# Patient Record
Sex: FEMALE | Race: OTHER | HISPANIC OR LATINO | Employment: UNEMPLOYED | URBAN - METROPOLITAN AREA
[De-identification: names, ages, dates, MRNs, and addresses within clinical notes are randomized per-mention and may not be internally consistent; named-entity substitution may affect disease eponyms.]

---

## 2019-06-20 ENCOUNTER — INITIAL PRENATAL (OUTPATIENT)
Dept: OBGYN CLINIC | Facility: CLINIC | Age: 19
End: 2019-06-20

## 2019-06-20 VITALS
WEIGHT: 98.8 LBS | DIASTOLIC BLOOD PRESSURE: 72 MMHG | SYSTOLIC BLOOD PRESSURE: 106 MMHG | HEIGHT: 56 IN | RESPIRATION RATE: 16 BRPM | HEART RATE: 100 BPM | BODY MASS INDEX: 22.22 KG/M2

## 2019-06-20 DIAGNOSIS — Z34.91 PRENATAL CARE IN FIRST TRIMESTER: Primary | ICD-10-CM

## 2019-06-20 PROCEDURE — 99211 OFF/OP EST MAY X REQ PHY/QHP: CPT

## 2019-06-25 ENCOUNTER — ROUTINE PRENATAL (OUTPATIENT)
Dept: OBGYN CLINIC | Facility: CLINIC | Age: 19
End: 2019-06-25

## 2019-06-25 VITALS
BODY MASS INDEX: 22.27 KG/M2 | HEART RATE: 109 BPM | HEIGHT: 56 IN | WEIGHT: 99 LBS | DIASTOLIC BLOOD PRESSURE: 71 MMHG | SYSTOLIC BLOOD PRESSURE: 110 MMHG

## 2019-06-25 DIAGNOSIS — Z11.3 ROUTINE SCREENING FOR STI (SEXUALLY TRANSMITTED INFECTION): Primary | ICD-10-CM

## 2019-06-25 DIAGNOSIS — Z3A.08 8 WEEKS GESTATION OF PREGNANCY: ICD-10-CM

## 2019-06-25 PROCEDURE — 87491 CHLMYD TRACH DNA AMP PROBE: CPT | Performed by: FAMILY MEDICINE

## 2019-06-25 PROCEDURE — 99213 OFFICE O/P EST LOW 20 MIN: CPT | Performed by: OBSTETRICS & GYNECOLOGY

## 2019-06-25 PROCEDURE — 87591 N.GONORRHOEAE DNA AMP PROB: CPT | Performed by: FAMILY MEDICINE

## 2019-06-26 DIAGNOSIS — A74.9 CHLAMYDIA: Primary | ICD-10-CM

## 2019-06-26 LAB
C TRACH DNA SPEC QL NAA+PROBE: POSITIVE
N GONORRHOEA DNA SPEC QL NAA+PROBE: NEGATIVE

## 2019-06-26 RX ORDER — AZITHROMYCIN 250 MG/1
1000 TABLET, FILM COATED ORAL ONCE
Qty: 4 TABLET | Refills: 0 | Status: SHIPPED | OUTPATIENT
Start: 2019-06-26 | End: 2019-06-26

## 2019-06-27 ENCOUNTER — TELEPHONE (OUTPATIENT)
Dept: OBGYN CLINIC | Facility: CLINIC | Age: 19
End: 2019-06-27

## 2019-07-02 ENCOUNTER — OFFICE VISIT (OUTPATIENT)
Dept: OBGYN CLINIC | Facility: CLINIC | Age: 19
End: 2019-07-02

## 2019-07-02 ENCOUNTER — PATIENT OUTREACH (OUTPATIENT)
Dept: OBGYN CLINIC | Facility: CLINIC | Age: 19
End: 2019-07-02

## 2019-07-02 VITALS
SYSTOLIC BLOOD PRESSURE: 106 MMHG | HEIGHT: 56 IN | WEIGHT: 94 LBS | BODY MASS INDEX: 21.15 KG/M2 | HEART RATE: 121 BPM | DIASTOLIC BLOOD PRESSURE: 70 MMHG

## 2019-07-02 DIAGNOSIS — Z3A.09 9 WEEKS GESTATION OF PREGNANCY: Primary | ICD-10-CM

## 2019-07-02 DIAGNOSIS — A74.9 CHLAMYDIA INFECTION AFFECTING PREGNANCY IN FIRST TRIMESTER: ICD-10-CM

## 2019-07-02 DIAGNOSIS — O98.811 CHLAMYDIA INFECTION AFFECTING PREGNANCY IN FIRST TRIMESTER: ICD-10-CM

## 2019-07-02 DIAGNOSIS — Z87.898 HISTORY OF DOMESTIC VIOLENCE: ICD-10-CM

## 2019-07-02 PROCEDURE — 99213 OFFICE O/P EST LOW 20 MIN: CPT | Performed by: OBSTETRICS & GYNECOLOGY
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
## 2019-07-03 ENCOUNTER — PATIENT OUTREACH (OUTPATIENT)
Dept: OBGYN CLINIC | Facility: CLINIC | Age: 19
End: 2019-07-03

## 2019-07-03 ENCOUNTER — APPOINTMENT (OUTPATIENT)
Dept: LAB | Facility: HOSPITAL | Age: 19
End: 2019-07-03
Payer: COMMERCIAL

## 2019-07-03 DIAGNOSIS — Z69.81 PATIENT COUNSELED AS VICTIM OF DOMESTIC VIOLENCE: Primary | ICD-10-CM

## 2019-07-03 DIAGNOSIS — Z34.91 PRENATAL CARE IN FIRST TRIMESTER: ICD-10-CM

## 2019-07-03 LAB
ABO GROUP BLD: NORMAL
BACTERIA UR QL AUTO: ABNORMAL /HPF
BASOPHILS # BLD AUTO: 0.01 THOUSANDS/ΜL (ref 0–0.1)
BASOPHILS NFR BLD AUTO: 0 % (ref 0–1)
BILIRUB UR QL STRIP: NEGATIVE
BLD GP AB SCN SERPL QL: NEGATIVE
CAOX CRY URNS QL MICRO: ABNORMAL /HPF
CLARITY UR: ABNORMAL
COLOR UR: ABNORMAL
EOSINOPHIL # BLD AUTO: 0.04 THOUSAND/ΜL (ref 0–0.61)
EOSINOPHIL NFR BLD AUTO: 1 % (ref 0–6)
ERYTHROCYTE [DISTWIDTH] IN BLOOD BY AUTOMATED COUNT: 13.6 % (ref 11.6–15.1)
GLUCOSE UR STRIP-MCNC: NEGATIVE MG/DL
HCT VFR BLD AUTO: 34.3 % (ref 34.8–46.1)
HGB BLD-MCNC: 11.4 G/DL (ref 11.5–15.4)
HGB UR QL STRIP.AUTO: NEGATIVE
IMM GRANULOCYTES # BLD AUTO: 0.01 THOUSAND/UL (ref 0–0.2)
IMM GRANULOCYTES NFR BLD AUTO: 0 % (ref 0–2)
KETONES UR STRIP-MCNC: NEGATIVE MG/DL
LEUKOCYTE ESTERASE UR QL STRIP: ABNORMAL
LYMPHOCYTES # BLD AUTO: 1.62 THOUSANDS/ΜL (ref 0.6–4.47)
LYMPHOCYTES NFR BLD AUTO: 25 % (ref 14–44)
MCH RBC QN AUTO: 27.5 PG (ref 26.8–34.3)
MCHC RBC AUTO-ENTMCNC: 33.2 G/DL (ref 31.4–37.4)
MCV RBC AUTO: 83 FL (ref 82–98)
MONOCYTES # BLD AUTO: 0.48 THOUSAND/ΜL (ref 0.17–1.22)
MONOCYTES NFR BLD AUTO: 8 % (ref 4–12)
MUCOUS THREADS UR QL AUTO: ABNORMAL
NEUTROPHILS # BLD AUTO: 4.25 THOUSANDS/ΜL (ref 1.85–7.62)
NEUTS SEG NFR BLD AUTO: 66 % (ref 43–75)
NITRITE UR QL STRIP: NEGATIVE
NON-SQ EPI CELLS URNS QL MICRO: ABNORMAL /HPF
NRBC BLD AUTO-RTO: 0 /100 WBCS
PH UR STRIP.AUTO: 6 [PH]
PLATELET # BLD AUTO: 260 THOUSANDS/UL (ref 149–390)
PMV BLD AUTO: 10 FL (ref 8.9–12.7)
PROT UR STRIP-MCNC: ABNORMAL MG/DL
RBC # BLD AUTO: 4.14 MILLION/UL (ref 3.81–5.12)
RBC #/AREA URNS AUTO: ABNORMAL /HPF
RH BLD: POSITIVE
RUBV IGG SERPL IA-ACNC: 36.9 IU/ML
SP GR UR STRIP.AUTO: 1.03 (ref 1–1.03)
SPECIMEN EXPIRATION DATE: NORMAL
UROBILINOGEN UR QL STRIP.AUTO: 1 E.U./DL
WBC # BLD AUTO: 6.41 THOUSAND/UL (ref 4.31–10.16)
WBC #/AREA URNS AUTO: ABNORMAL /HPF

## 2019-07-03 PROCEDURE — 80081 OBSTETRIC PANEL INC HIV TSTG: CPT

## 2019-07-03 PROCEDURE — 36415 COLL VENOUS BLD VENIPUNCTURE: CPT

## 2019-07-03 PROCEDURE — 81001 URINALYSIS AUTO W/SCOPE: CPT

## 2019-07-03 PROCEDURE — 87086 URINE CULTURE/COLONY COUNT: CPT
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
## 2019-07-04 LAB
BACTERIA UR CULT: NORMAL
HBV SURFACE AG SER QL: NORMAL

## 2019-07-05 LAB
HIV 1+2 AB+HIV1 P24 AG SERPL QL IA: NORMAL
RPR SER QL: NORMAL

## 2019-07-14 ENCOUNTER — HOSPITAL ENCOUNTER (EMERGENCY)
Facility: HOSPITAL | Age: 19
Discharge: HOME | End: 2019-07-15
Attending: EMERGENCY MEDICINE | Admitting: EMERGENCY MEDICINE
Payer: COMMERCIAL

## 2019-07-14 DIAGNOSIS — E86.0 DEHYDRATION DURING PREGNANCY: ICD-10-CM

## 2019-07-14 DIAGNOSIS — O21.9 NAUSEA AND VOMITING IN PREGNANCY: Primary | ICD-10-CM

## 2019-07-14 DIAGNOSIS — O99.280 DEHYDRATION DURING PREGNANCY: ICD-10-CM

## 2019-07-14 LAB
ALBUMIN SERPL BCP-MCNC: 3.4 G/DL (ref 3.5–5)
ALP SERPL-CCNC: 44 U/L (ref 46–116)
ALT SERPL W P-5'-P-CCNC: 14 U/L (ref 12–78)
ANION GAP SERPL CALCULATED.3IONS-SCNC: 9 MMOL/L (ref 4–13)
AST SERPL W P-5'-P-CCNC: 14 U/L (ref 5–45)
BACTERIA UR QL AUTO: ABNORMAL /HPF
BASOPHILS # BLD AUTO: 0.01 THOUSANDS/ΜL (ref 0–0.1)
BASOPHILS NFR BLD AUTO: 0 % (ref 0–1)
BILIRUB SERPL-MCNC: 0.42 MG/DL (ref 0.2–1)
BILIRUB UR QL STRIP: NEGATIVE
BUN SERPL-MCNC: 6 MG/DL (ref 5–25)
CALCIUM SERPL-MCNC: 9 MG/DL (ref 8.3–10.1)
CHLORIDE SERPL-SCNC: 102 MMOL/L (ref 100–108)
CLARITY UR: ABNORMAL
CO2 SERPL-SCNC: 24 MMOL/L (ref 21–32)
COLOR UR: YELLOW
COLOR, POC: NORMAL
COLOR, POC: NORMAL
CREAT SERPL-MCNC: 0.46 MG/DL (ref 0.6–1.3)
EOSINOPHIL # BLD AUTO: 0.01 THOUSAND/ΜL (ref 0–0.61)
EOSINOPHIL NFR BLD AUTO: 0 % (ref 0–6)
ERYTHROCYTE [DISTWIDTH] IN BLOOD BY AUTOMATED COUNT: 13.7 % (ref 11.6–15.1)
GFR SERPL CREATININE-BSD FRML MDRD: 144 ML/MIN/1.73SQ M
GLUCOSE SERPL-MCNC: 87 MG/DL (ref 65–140)
GLUCOSE UR STRIP-MCNC: NEGATIVE MG/DL
HCT VFR BLD AUTO: 31.4 % (ref 34.8–46.1)
HGB BLD-MCNC: 10.8 G/DL (ref 11.5–15.4)
HGB UR QL STRIP.AUTO: NEGATIVE
HYALINE CASTS #/AREA URNS LPF: ABNORMAL /LPF
IMM GRANULOCYTES # BLD AUTO: 0.02 THOUSAND/UL (ref 0–0.2)
IMM GRANULOCYTES NFR BLD AUTO: 0 % (ref 0–2)
KETONES UR STRIP-MCNC: ABNORMAL MG/DL
LEUKOCYTE ESTERASE UR QL STRIP: ABNORMAL
LIPASE SERPL-CCNC: 137 U/L (ref 73–393)
LYMPHOCYTES # BLD AUTO: 0.53 THOUSANDS/ΜL (ref 0.6–4.47)
LYMPHOCYTES NFR BLD AUTO: 6 % (ref 14–44)
MCH RBC QN AUTO: 28 PG (ref 26.8–34.3)
MCHC RBC AUTO-ENTMCNC: 34.4 G/DL (ref 31.4–37.4)
MCV RBC AUTO: 81 FL (ref 82–98)
MONOCYTES # BLD AUTO: 0.43 THOUSAND/ΜL (ref 0.17–1.22)
MONOCYTES NFR BLD AUTO: 5 % (ref 4–12)
NEUTROPHILS # BLD AUTO: 7.74 THOUSANDS/ΜL (ref 1.85–7.62)
NEUTS SEG NFR BLD AUTO: 89 % (ref 43–75)
NITRITE UR QL STRIP: NEGATIVE
NON-SQ EPI CELLS URNS QL MICRO: ABNORMAL /HPF
NRBC BLD AUTO-RTO: 0 /100 WBCS
PH UR STRIP.AUTO: 7.5 [PH] (ref 4.5–8)
PLATELET # BLD AUTO: 200 THOUSANDS/UL (ref 149–390)
PMV BLD AUTO: 9.7 FL (ref 8.9–12.7)
POTASSIUM SERPL-SCNC: 3.9 MMOL/L (ref 3.5–5.3)
PROT SERPL-MCNC: 7.2 G/DL (ref 6.4–8.2)
PROT UR STRIP-MCNC: ABNORMAL MG/DL
RBC # BLD AUTO: 3.86 MILLION/UL (ref 3.81–5.12)
RBC #/AREA URNS AUTO: ABNORMAL /HPF
SODIUM SERPL-SCNC: 135 MMOL/L (ref 136–145)
SP GR UR STRIP.AUTO: 1.02 (ref 1–1.03)
UROBILINOGEN UR QL STRIP.AUTO: 0.2 E.U./DL
WBC # BLD AUTO: 8.74 THOUSAND/UL (ref 4.31–10.16)
WBC #/AREA URNS AUTO: ABNORMAL /HPF

## 2019-07-14 PROCEDURE — 87086 URINE CULTURE/COLONY COUNT: CPT

## 2019-07-14 PROCEDURE — 85025 COMPLETE CBC W/AUTO DIFF WBC: CPT | Performed by: EMERGENCY MEDICINE

## 2019-07-14 PROCEDURE — 96365 THER/PROPH/DIAG IV INF INIT: CPT

## 2019-07-14 PROCEDURE — 99283 EMERGENCY DEPT VISIT LOW MDM: CPT | Performed by: EMERGENCY MEDICINE

## 2019-07-14 PROCEDURE — 83690 ASSAY OF LIPASE: CPT | Performed by: EMERGENCY MEDICINE

## 2019-07-14 PROCEDURE — 80053 COMPREHEN METABOLIC PANEL: CPT | Performed by: EMERGENCY MEDICINE

## 2019-07-14 PROCEDURE — 81001 URINALYSIS AUTO W/SCOPE: CPT

## 2019-07-14 PROCEDURE — 36415 COLL VENOUS BLD VENIPUNCTURE: CPT | Performed by: EMERGENCY MEDICINE

## 2019-07-14 PROCEDURE — 99284 EMERGENCY DEPT VISIT MOD MDM: CPT

## 2019-07-14 RX ADMIN — DEXTROSE AND SODIUM CHLORIDE 1000 ML: 5; .9 INJECTION, SOLUTION INTRAVENOUS at 23:18

## 2019-07-15 VITALS
WEIGHT: 99 LBS | OXYGEN SATURATION: 99 % | BODY MASS INDEX: 22.27 KG/M2 | DIASTOLIC BLOOD PRESSURE: 61 MMHG | HEIGHT: 56 IN | TEMPERATURE: 98.7 F | RESPIRATION RATE: 20 BRPM | SYSTOLIC BLOOD PRESSURE: 101 MMHG | HEART RATE: 98 BPM

## 2019-07-15 PROCEDURE — 96375 TX/PRO/DX INJ NEW DRUG ADDON: CPT

## 2019-07-15 RX ORDER — ACETAMINOPHEN 325 MG/1
975 TABLET ORAL ONCE
Status: COMPLETED | OUTPATIENT
Start: 2019-07-15 | End: 2019-07-15

## 2019-07-15 RX ORDER — ONDANSETRON 2 MG/ML
4 INJECTION INTRAMUSCULAR; INTRAVENOUS ONCE
Status: COMPLETED | OUTPATIENT
Start: 2019-07-15 | End: 2019-07-15

## 2019-07-15 RX ORDER — ONDANSETRON 4 MG/1
4 TABLET, ORALLY DISINTEGRATING ORAL EVERY 8 HOURS PRN
Qty: 20 TABLET | Refills: 0 | Status: SHIPPED | OUTPATIENT
Start: 2019-07-15 | End: 2019-07-23

## 2019-07-15 RX ADMIN — ONDANSETRON 4 MG: 2 INJECTION INTRAMUSCULAR; INTRAVENOUS at 00:54

## 2019-07-15 RX ADMIN — ACETAMINOPHEN 975 MG: 325 TABLET ORAL at 00:54

## 2019-07-15 NOTE — ED ATTENDING ATTESTATION
Marjorie Kayser, MD, saw and evaluated the patient  I have discussed the patient with the resident/non-physician practitioner and agree with the resident's/non-physician practitioner's findings, Plan of Care, and MDM as documented in the resident's/non-physician practitioner's note, except where noted  All available labs and Radiology studies were reviewed  I was present for key portions of any procedure(s) performed by the resident/non-physician practitioner and I was immediately available to provide assistance  At this point I agree with the current assessment done in the Emergency Department  I have conducted an independent evaluation of this patient a history and physical is as follows:      Critical Care Time  Procedures     21 yo female , 11 weeks pregnant having increased vomiting and headache, decreased po intake, no vaginal bleeding, no vaginal discharge, no abdominal pain, no fever  No uri symptoms  Pt has not discussed symptoms with ob  Vss, afebrile, tachy, lungs cta, rrr, abdomen soft nontender, occipital tenderness, no neuro deficits  Zofran, tylenol, labs, ivf, urine, u/s fetus

## 2019-07-15 NOTE — ED PROVIDER NOTES
History  Chief Complaint   Patient presents with    Vomiting During Pregnancy     Pt reports being 11 weeks pregnant  Pt reports "I've been throwing up since yesterday, I haven't been able to eat or drink"  65-year-old woman with no significant past medical history,  11 weeks pregnant by dating ultrasound presents for evaluation nausea, vomiting and headache  The symptoms started roughly 4 days ago  She complains of an occipital headache  Headache started out mild but has gradually worsened  She has associated nausea and vomiting  She has vomited innumerable times over the last 4 days, nbnb  She has not been able to eat or drink anything today  She has not taken any medication for her symptoms  She only takes a prenatal vitamin  Patient had all of her 1st trimester testing 3 weeks ago  She was found to be positive for chlamydia and was treated  She has had a this scant clear vaginal discharge for the past week  She denies abdominal pain, dizziness, dysuria, numbness, weakness, tingling her extremities  Denies syncope  Prior to Admission Medications   Prescriptions Last Dose Informant Patient Reported? Taking? Prenatal Vit-Fe Fumarate-FA (PRENATAL 1+1 PO) 2019 at Unknown time  Yes Yes   Sig: Take by mouth      Facility-Administered Medications: None       Past Medical History:   Diagnosis Date    Varicella        History reviewed  No pertinent surgical history  Family History   Problem Relation Age of Onset    Fibroids Mother     No Known Problems Father     Asthma Brother     Diabetes Maternal Grandmother     Breast cancer Maternal Grandmother     Brain cancer Maternal Grandmother     Diabetes Maternal Grandfather     Dementia Maternal Grandfather     Diabetes Paternal Grandmother     No Known Problems Paternal Grandfather      I have reviewed and agree with the history as documented      Social History     Tobacco Use    Smoking status: Former Smoker    Smokeless tobacco: Never Used   Substance Use Topics    Alcohol use: Not Currently    Drug use: Not Currently     Types: Marijuana        Review of Systems   Constitutional: Positive for appetite change  Negative for chills, diaphoresis, fatigue and fever  HENT: Negative for congestion, rhinorrhea and sore throat  Respiratory: Negative for cough, shortness of breath, wheezing and stridor  Cardiovascular: Negative for chest pain, palpitations and leg swelling  Gastrointestinal: Positive for nausea and vomiting  Negative for abdominal distention, abdominal pain, constipation and diarrhea  Endocrine: Negative for polydipsia and polyuria  Genitourinary: Negative for dysuria, hematuria, vaginal bleeding, vaginal discharge and vaginal pain  Musculoskeletal: Negative for back pain, neck pain and neck stiffness  Skin: Negative for pallor, rash and wound  Neurological: Positive for light-headedness and headaches  Negative for dizziness  Psychiatric/Behavioral: Negative for behavioral problems and confusion  All other systems reviewed and are negative  Physical Exam  ED Triage Vitals [07/14/19 2222]   Temperature Pulse Respirations Blood Pressure SpO2   98 7 °F (37 1 °C) (!) 118 18 98/68 98 %      Temp Source Heart Rate Source Patient Position - Orthostatic VS BP Location FiO2 (%)   Oral Monitor Lying Right arm --      Pain Score       7             Orthostatic Vital Signs  Vitals:    07/14/19 2222 07/14/19 2339 07/15/19 0057   BP: 98/68 (!) 89/50 101/61   Pulse: (!) 118 100 98   Patient Position - Orthostatic VS: Lying Lying        Physical Exam   Constitutional: She is oriented to person, place, and time  She appears well-developed and well-nourished  No distress  Dry mucus membranes   HENT:   Head: Normocephalic and atraumatic  Eyes: Conjunctivae are normal  No scleral icterus  Neck: Normal range of motion  Neck supple     Cardiovascular: Regular rhythm, normal heart sounds and intact distal pulses  Tachycardia present  No murmur heard  Pulmonary/Chest: Effort normal and breath sounds normal  No respiratory distress  She has no wheezes  Abdominal: Soft  Bowel sounds are normal  She exhibits no distension  There is tenderness  Mild suprapubic TTP   Musculoskeletal: Normal range of motion  She exhibits no edema, tenderness or deformity  Neurological: She is alert and oriented to person, place, and time  Skin: Skin is warm and dry  No rash noted  She is not diaphoretic  No erythema  No pallor  Psychiatric: She has a normal mood and affect  Her behavior is normal    Nursing note and vitals reviewed            ED Medications  Medications   dextrose 5 % and sodium chloride 0 9 % bolus 1,000 mL (0 mL Intravenous Stopped 7/15/19 0046)   ondansetron (ZOFRAN) injection 4 mg (4 mg Intravenous Given 7/15/19 0054)   acetaminophen (TYLENOL) tablet 975 mg (975 mg Oral Given 7/15/19 0054)       Diagnostic Studies  Results Reviewed     Procedure Component Value Units Date/Time    Comprehensive metabolic panel [[id number]]  (Abnormal) Collected:  07/14/19 0519    Lab Status:  Final result Specimen:  Blood from Arm, Left Updated:  07/14/19 3347     Sodium 135 mmol/L      Potassium 3 9 mmol/L      Chloride 102 mmol/L      CO2 24 mmol/L      ANION GAP 9 mmol/L      BUN 6 mg/dL      Creatinine 0 46 mg/dL      Glucose 87 mg/dL      Calcium 9 0 mg/dL      AST 14 U/L      ALT 14 U/L      Alkaline Phosphatase 44 U/L      Total Protein 7 2 g/dL      Albumin 3 4 g/dL      Total Bilirubin 0 42 mg/dL      eGFR 144 ml/min/1 73sq m     Narrative:       [place] guidelines for Chronic Kidney Disease (CKD):     Stage 1 with normal or high GFR (GFR > 90 mL/min/1 73 square meters)    Stage 2 Mild CKD (GFR = 60-89 mL/min/1 73 square meters)    Stage 3A Moderate CKD (GFR = 45-59 mL/min/1 73 square meters)    Stage 3B Moderate CKD (GFR = 30-44 mL/min/1 73 square meters)    Stage 4 Severe CKD (GFR = 15-29 mL/min/1 73 square meters)    Stage 5 End Stage CKD (GFR <15 mL/min/1 73 square meters)  Note: GFR calculation is accurate only with a steady state creatinine    Lipase [[id number]]  (Normal) Collected:  07/14/19 2319    Lab Status:  Final result Specimen:  Blood from Arm, Left Updated:  07/14/19 2353     Lipase 137 u/L     Urine Microscopic [[id number]]  (Abnormal) Collected:  07/14/19 2318    Lab Status:  Final result Specimen:  Urine, Clean Catch Updated:  07/14/19 2336     RBC, UA 4-10 /hpf      WBC, UA 4-10 /hpf      Epithelial Cells Moderate /hpf      Bacteria, UA Occasional /hpf      Hyaline Casts, UA 10-25 /lpf     CBC and differential [[id number]]  (Abnormal) Collected:  07/14/19 2319    Lab Status:  Final result Specimen:  Blood from Arm, Left Updated:  07/14/19 2327     WBC 8 74 Thousand/uL      RBC 3 86 Million/uL      Hemoglobin 10 8 g/dL      Hematocrit 31 4 %      MCV 81 fL      MCH 28 0 pg      MCHC 34 4 g/dL      RDW 13 7 %      MPV 9 7 fL      Platelets 951 Thousands/uL      nRBC 0 /100 WBCs      Neutrophils Relative 89 %      Immat GRANS % 0 %      Lymphocytes Relative 6 %      Monocytes Relative 5 %      Eosinophils Relative 0 %      Basophils Relative 0 %      Neutrophils Absolute 7 74 Thousands/µL      Immature Grans Absolute 0 02 Thousand/uL      Lymphocytes Absolute 0 53 Thousands/µL      Monocytes Absolute 0 43 Thousand/µL      Eosinophils Absolute 0 01 Thousand/µL      Basophils Absolute 0 01 Thousands/µL     Urine culture [[id number]] Collected:  07/14/19 2318    Lab Status:   In process Specimen:  Urine, Clean Catch Updated:  07/14/19 2325    POCT urinalysis dipstick [[id number]]  (Normal) Resulted:  07/14/19 2316    Lab Status:  Final result Specimen:  Urine Updated:  07/14/19 2316     Color, UA see chart    POCT urinalysis dipstick [[id number]]  (Normal) Resulted:  07/14/19 2316    Lab Status:  Final result Updated:  07/14/19 2316     Color, UA see chart    ED Urine Macroscopic [[id number]]  (Abnormal) Collected:  19    Lab Status:  Final result Specimen:  Urine Updated:  19     Color, UA Yellow     Clarity, UA Slightly Cloudy     pH, UA 7 5     Leukocytes, UA Trace     Nitrite, UA Negative     Protein, UA 30 (1+) mg/dl      Glucose, UA Negative mg/dl      Ketones, UA 40 (2+) mg/dl      Urobilinogen, UA 0 2 E U /dl      Bilirubin, UA Negative     Blood, UA Negative     Specific Gravity, UA 1 020    Narrative:       CLINITEK RESULT    Chlamydia/GC amplified DNA by PCR [[id number]]     Lab Status:  No result Specimen:  Urine, Other                  No orders to display         Procedures  Procedures        ED Course  ED Course as of Jul 15 0224   Sun 2019   2342 Ketones, UA(!): 40 (2+)   Mon Jul 15, 2019   0140 Patient feeling well on re-evaluation  Patient tolerating p o  Fluids  Will discharge home with script for Zofran  Follow up with OBGYN tomorrow  MDM  Number of Diagnoses or Management Options  Dehydration during pregnancy: new and requires workup  Nausea and vomiting in pregnancy: new and requires workup  Diagnosis management comments: 56-year-old woman  at 11 weeks presents with nausea vomiting  On exam patient has dry mucous membranes, appears clinically dehydrated and has mild suprapubic tenderness to palpation  Will check labs, urinalysis, abdominal ultrasound for fetal heart tones  Will give Zofran, Tylenol, bolus of 1 L D5 NS  Reassess         Amount and/or Complexity of Data Reviewed  Clinical lab tests: ordered and reviewed  Decide to obtain previous medical records or to obtain history from someone other than the patient: yes  Obtain history from someone other than the patient: yes  Review and summarize past medical records: yes  Discuss the patient with other providers: yes  Independent visualization of images, tracings, or specimens: yes    Risk of Complications, Morbidity, and/or Mortality  Presenting problems: low  Diagnostic procedures: minimal  Management options: minimal    Patient Progress  Patient progress: improved      Disposition  Final diagnoses:   Nausea and vomiting in pregnancy   Dehydration during pregnancy     Time reflects when diagnosis was documented in both MDM as applicable and the Disposition within this note     Time User Action Codes Description Comment    7/15/2019  1:32 AM Ashanti Forrest Add [O21 9] Nausea and vomiting in pregnancy     7/15/2019  1:32 AM Freedom Forrest Add [O26 899,  E86 0] Dehydration during pregnancy       ED Disposition     ED Disposition Condition Date/Time Comment    Discharge Stable Mon Jul 15, 2019  1:32 AM Bryan Miranda discharge to home/self care  Follow-up Information     Follow up With Specialties Details Why Contact Info Additional [address] [facility] Emergency [facility]  As needed [address] [facility], [address] [place] [address], [facility]          Discharge Medication List as of 7/15/2019  1:33 AM      START taking these medications    Details   ondansetron (ZOFRAN-ODT) 4 mg disintegrating tablet Take 1 tablet (4 mg total) by mouth every 8 (eight) hours as needed for nausea or vomiting, Starting Mon 7/15/2019, Print         CONTINUE these medications which have NOT CHANGED    Details   Prenatal Vit-Fe Fumarate-FA (PRENATAL 1+1 PO) Take by mouth, Historical Med           No discharge procedures on file  ED Provider  Attending physically available and evaluated Bryan Miranda  I managed the patient along with the ED Attending      Electronically Signed by         Ceasar Moran MD  07/15/19 5265

## 2019-07-17 LAB — BACTERIA UR CULT: ABNORMAL

## 2019-07-22 PROBLEM — Z3A.12 12 WEEKS GESTATION OF PREGNANCY: Status: ACTIVE | Noted: 2019-06-25

## 2019-07-22 NOTE — PATIENT INSTRUCTIONS
Pregnancy at 15 to 18 Weeks   [address]:   What changes are happening in my body? Now that you are in your second trimester, you have more energy  You may also feel hungrier than usual  You may start to experience other symptoms, such as heartburn or dizziness  You may be gaining about ½ to 1 pound a week, and your pregnancy is beginning to show  You may need to start wearing maternity clothes  How do I care for myself at this stage of my pregnancy? · Manage heartburn  by eating 4 or 5 small meals each day instead of large meals  Avoid spicy foods  Avoid eating right before bedtime  · Manage nausea and vomiting  Avoid fatty and spicy foods  Eat small meals throughout the day instead of large meals  Chelle may help to decrease nausea  Ask your healthcare provider about other ways of decreasing nausea and vomiting  · Eat a variety of healthy foods  Healthy foods include fruits, vegetables, whole-grain breads, low-fat dairy foods, beans, lean meats, and fish  Drink liquids as directed  Ask how much liquid to drink each day and which liquids are best for you  Limit caffeine to less than 200 milligrams each day  Limit your intake of fish to 2 servings each week  Choose fish low in mercury such as canned light tuna, shrimp, salmon, cod, or tilapia  Do not  eat fish high in mercury such as swordfish, tilefish, jackie mackerel, and shark  · Take prenatal vitamins as directed  Your need for certain vitamins and minerals, such as folic acid, increases during pregnancy  Prenatal vitamins provide some of the extra vitamins and minerals you need  Prenatal vitamins may also help to decrease the risk of certain birth defects  · Do not smoke  If you smoke, it is never too late to quit  Smoking increases your risk of a miscarriage and other health problems during your pregnancy  Smoking can cause your baby to be born too early or weigh less at birth   Ask your healthcare provider for information if you need help quitting  · Do not drink alcohol  Alcohol passes from your body to your baby through the placenta  It can affect your baby's brain development and cause fetal alcohol syndrome (FAS)  FAS is a group of conditions that causes mental, behavior, and growth problems  · Talk to your healthcare provider before you take any medicines  Many medicines may harm your baby if you take them when you are pregnant  Do not take any medicines, vitamins, herbs, or supplements without first talking to your healthcare provider  Never use illegal or street drugs (such as marijuana or cocaine) while you are pregnant  What are some safety tips during pregnancy? · Avoid hot tubs and saunas  Do not use a hot tub or sauna while you are pregnant, especially during your first trimester  Hot tubs and saunas may raise your baby's temperature and increase the risk of birth defects  · Avoid toxoplasmosis  This is an infection caused by eating raw meat or being around infected cat feces  It can cause birth defects, miscarriages, and other problems  Wash your hands after you touch raw meat  Make sure any meat is well-cooked before you eat it  Avoid raw eggs and unpasteurized milk  Use gloves or ask someone else to clean your cat's litter box while you are pregnant  What changes are happening with my baby? By 18 weeks, your baby may be about 6 inches long from the top of the head to the rump (baby's bottom)  Your baby may weigh about 11 ounces  You may be able to feel your baby's movement at about 18 weeks or later  The first movements may not be that noticeable  They may feel like a fluttering sensation  Your baby also makes sucking movements and can hear certain sounds  What do I need to know about prenatal care? During the first 28 weeks of your pregnancy, you will see your healthcare provider once a month  Your healthcare provider will check your blood pressure and weight   You may also need any of the following:  · A urine test  may also be done to check for sugar and protein  These can be signs of gestational diabetes or infection  · A blood test  may be done to check for anemia (low iron level)  · Fundal height check  is a measurement of your uterus to check your baby's growth  This number is usually the same as the number of weeks that you have been pregnant  · An ultrasound  may be done to check your baby's development  Your healthcare provider may be able to tell you what your baby's gender is during the ultrasound  · Your baby's heart rate  will be checked  When should I seek immediate care? · You have pain or cramping in your abdomen or low back  · You have heavy vaginal bleeding or clotting  · You pass material that looks like tissue or large clots  Collect the material and bring it with you  When should I contact my healthcare provider? · You cannot keep food or drinks down, and you are losing weight  · You have light bleeding  · You have chills or a fever  · You have vaginal itching, burning, or pain  · You have yellow, green, white, or foul-smelling vaginal discharge  · You have pain or burning when you urinate, less urine than usual, or pink or bloody urine  · You have questions or concerns about your condition or care  CARE AGREEMENT:   You have the right to help plan your care  Learn about your health condition and how it may be treated  Discuss treatment options with your caregivers to decide what care you want to receive  You always have the right to refuse treatment  The above information is an  only  It is not intended as medical advice for individual conditions or treatments  Talk to your doctor, nurse or pharmacist before following any medical regimen to see if it is safe and effective for you    © 2017 [employer] Information is for End User's use only and may not be sold, redistributed or otherwise used for commercial purposes  All illustrations and images included in CareNotes® are the copyrighted property of [employer] D A M [employer]  or [employer]  Pregnancy at [address] to [address]:   What changes are happening in my body? You are now at the end of your first trimester and entering your second trimester  Morning sickness usually goes away by this time  You may have other symptoms such as fatigue, frequent urination, and headaches  You may have gained between 2 to 4 pounds by now  How do I care for myself at this stage of my pregnancy? · Get plenty of rest   You may feel more tired than normal  You may need to take naps or go to bed earlier  · Manage nausea and vomiting  Avoid fatty and spicy foods  Eat small meals throughout the day instead of large meals  Chelle may help to decrease nausea  Ask your healthcare provider about other ways of decreasing nausea and vomiting  · Eat a variety of healthy foods  Healthy foods include fruits, vegetables, whole-grain breads, low-fat dairy foods, beans, lean meats, and fish  Drink liquids as directed  Ask how much liquid to drink each day and which liquids are best for you  Limit caffeine to less than 200 milligrams each day  Limit your intake of fish to 2 servings each week  Choose fish low in mercury such as canned light tuna, shrimp, salmon, cod, or tilapia  Do not  eat fish high in mercury such as swordfish, tilefish, jackie mackerel, and shark  · Take prenatal vitamins as directed  Your need for certain vitamins and minerals, such as folic acid, increases during pregnancy  Prenatal vitamins provide some of the extra vitamins and minerals you need  Prenatal vitamins may also help to decrease the risk of certain birth defects  · Do not smoke  If you smoke, it is never too late to quit  Smoking increases your risk of a miscarriage and other health problems during your pregnancy   Smoking can cause your baby to be born too early or weigh less at birth  Ask your healthcare provider for information if you need help quitting  · Do not drink alcohol  Alcohol passes from your body to your baby through the placenta  It can affect your baby's brain development and cause fetal alcohol syndrome (FAS)  FAS is a group of conditions that causes mental, behavior, and growth problems  · Talk to your healthcare provider before you take any medicines  Many medicines may harm your baby if you take them when you are pregnant  Do not take any medicines, vitamins, herbs, or supplements without first talking to your healthcare provider  Never use illegal or street drugs (such as marijuana or cocaine) while you are pregnant  What are some safety tips during pregnancy? · Avoid hot tubs and saunas  Do not use a hot tub or sauna while you are pregnant, especially during your first trimester  Hot tubs and saunas may raise your baby's temperature and increase the risk of birth defects  · Avoid toxoplasmosis  This is an infection caused by eating raw meat or being around infected cat feces  It can cause birth defects, miscarriages, and other problems  Wash your hands after you touch raw meat  Make sure any meat is well-cooked before you eat it  Avoid raw eggs and unpasteurized milk  Use gloves or ask someone else to clean your cat's litter box while you are pregnant  What changes are happening with my baby? Your baby has fully formed fingernails and toenails  Your baby's heartbeat can now be heard  Ask your healthcare provider if you can listen to your baby's heartbeat  By week 14, your baby is over 4 inches long from the top of the head to the rump (baby's bottom)  Your baby weighs over 3 ounces  What do I need to know about prenatal care? During the first 28 weeks of your pregnancy, you will see your healthcare provider once a month  Prenatal care can help prevent problems during pregnancy and childbirth   Your healthcare provider will check your blood pressure and weight  You may also need any of the following:  · A urine test  may also be done to check for sugar and protein  These can be signs of gestational diabetes or infection  · Genetic disorders screening tests  may be offered to you  This screening test checks your baby's risk of genetic disorders such as Down syndrome  The screening test includes a blood test and ultrasound  · Your baby's heart rate  will be checked  When should I seek immediate care? · You have pain or cramping in your abdomen or low back  · You have heavy vaginal bleeding or clotting  · You pass material that looks like tissue or large clots  Collect the material and bring it with you  When should I contact my healthcare provider? · You cannot keep food or drinks down, and you are losing weight  · You have light bleeding  · You have chills or a fever  · You have vaginal itching, burning, or pain  · You have yellow, green, white, or foul-smelling vaginal discharge  · You have pain or burning when you urinate, less urine than usual, or pink or bloody urine  · You have questions or concerns about your condition or care  CARE AGREEMENT:   You have the right to help plan your care  Learn about your health condition and how it may be treated  Discuss treatment options with your caregivers to decide what care you want to receive  You always have the right to refuse treatment  The above information is an  only  It is not intended as medical advice for individual conditions or treatments  Talk to your doctor, nurse or pharmacist before following any medical regimen to see if it is safe and effective for you  © 2017 [address] [employer] Information is for End User's use only and may not be sold, redistributed or otherwise used for commercial purposes   All illustrations and images included in CareNotes® are the copyrighted property of [employer]  or [employer]

## 2019-07-23 ENCOUNTER — PATIENT OUTREACH (OUTPATIENT)
Dept: OBGYN CLINIC | Facility: CLINIC | Age: 19
End: 2019-07-23

## 2019-07-23 ENCOUNTER — ROUTINE PRENATAL (OUTPATIENT)
Dept: OBGYN CLINIC | Facility: CLINIC | Age: 19
End: 2019-07-23

## 2019-07-23 VITALS
HEIGHT: 56 IN | SYSTOLIC BLOOD PRESSURE: 103 MMHG | HEART RATE: 94 BPM | BODY MASS INDEX: 22.04 KG/M2 | WEIGHT: 98 LBS | DIASTOLIC BLOOD PRESSURE: 70 MMHG

## 2019-07-23 DIAGNOSIS — A74.9 CHLAMYDIA INFECTION AFFECTING PREGNANCY IN FIRST TRIMESTER: Primary | ICD-10-CM

## 2019-07-23 DIAGNOSIS — Z3A.12 12 WEEKS GESTATION OF PREGNANCY: ICD-10-CM

## 2019-07-23 DIAGNOSIS — O98.811 CHLAMYDIA INFECTION AFFECTING PREGNANCY IN FIRST TRIMESTER: Primary | ICD-10-CM

## 2019-07-23 PROCEDURE — 87591 N.GONORRHOEAE DNA AMP PROB: CPT | Performed by: NURSE PRACTITIONER

## 2019-07-23 PROCEDURE — 87491 CHLMYD TRACH DNA AMP PROBE: CPT | Performed by: NURSE PRACTITIONER

## 2019-07-23 PROCEDURE — 99213 OFFICE O/P EST LOW 20 MIN: CPT | Performed by: NURSE PRACTITIONER
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
## 2019-07-24 LAB
C TRACH DNA SPEC QL NAA+PROBE: NEGATIVE
N GONORRHOEA DNA SPEC QL NAA+PROBE: NEGATIVE

## 2019-07-31 ENCOUNTER — ROUTINE PRENATAL (OUTPATIENT)
Dept: PERINATAL CARE | Facility: CLINIC | Age: 19
End: 2019-07-31
Payer: COMMERCIAL

## 2019-07-31 VITALS
DIASTOLIC BLOOD PRESSURE: 67 MMHG | HEART RATE: 101 BPM | SYSTOLIC BLOOD PRESSURE: 105 MMHG | BODY MASS INDEX: 22.36 KG/M2 | HEIGHT: 56 IN | WEIGHT: 99.4 LBS

## 2019-07-31 DIAGNOSIS — O98.811 CHLAMYDIA INFECTION AFFECTING PREGNANCY IN FIRST TRIMESTER: ICD-10-CM

## 2019-07-31 DIAGNOSIS — A74.9 CHLAMYDIA INFECTION AFFECTING PREGNANCY IN FIRST TRIMESTER: ICD-10-CM

## 2019-07-31 DIAGNOSIS — Z87.898 HISTORY OF DOMESTIC VIOLENCE: ICD-10-CM

## 2019-07-31 DIAGNOSIS — Z34.91 PRENATAL CARE IN FIRST TRIMESTER: ICD-10-CM

## 2019-07-31 DIAGNOSIS — Z3A.13 13 WEEKS GESTATION OF PREGNANCY: ICD-10-CM

## 2019-07-31 DIAGNOSIS — Z36.82 NUCHAL TRANSLUCENCY OF FETUS ON PRENATAL ULTRASOUND: Primary | ICD-10-CM

## 2019-07-31 PROCEDURE — 76813 OB US NUCHAL MEAS 1 GEST: CPT | Performed by: OBSTETRICS & GYNECOLOGY

## 2019-07-31 NOTE — LETTER
July 31, 2019     [address] PROVIDER    Patient: Lucina Andres   YOB: 2000   Date of Visit: 7/31/2019       Dear   Provider: Thank you for referring Lucina Andres to me for evaluation  Below are my notes for this consultation  If you have questions, please do not hesitate to call me  I look forward to following your patient along with you  Sincerely,        Obdulia Mejia MD        CC: No Recipients  Obdulia Mejia MD  7/31/2019  7:59 PM  Incomplete    Please refer to the [facility] ultrasound report in Ob Procedures for additional information regarding the visit to the [facility]  today    Obdulia Mejia MD

## 2019-07-31 NOTE — PROGRESS NOTES
Please refer to the [facility] ultrasound report in Ob Procedures for additional information regarding the visit to the [facility]  today    Jude Antoine MD

## 2019-08-19 PROBLEM — Z3A.16 16 WEEKS GESTATION OF PREGNANCY: Status: ACTIVE | Noted: 2019-06-25

## 2019-08-19 NOTE — PATIENT INSTRUCTIONS
Pregnancy at 23 to 22 [address]:   What changes are happening in my body? Now that you are in your second trimester, you have more energy  You may also be feeling hungrier than usual  You may be gaining about ½ to 1 pound a week, and your pregnancy is beginning to show  You may need to start wearing maternity clothes  As your baby gets larger, you may have other symptoms  These may include body aches or stretch marks on your abdomen, breasts, thighs, or buttocks  How do I care for myself at this stage of my pregnancy? · Eat a variety of healthy foods  Healthy foods include fruits, vegetables, whole-grain breads, low-fat dairy foods, beans, lean meats, and fish  Drink liquids as directed  Ask how much liquid to drink each day and which liquids are best for you  Limit caffeine to less than 200 milligrams each day  Limit your intake of fish to 2 servings each week  Choose fish low in mercury such as canned light tuna, shrimp, salmon, cod, or tilapia  Do not  eat fish high in mercury such as swordfish, tilefish, jackie mackerel, and shark  · Take prenatal vitamins as directed  Your need for certain vitamins and minerals, such as folic acid, increases during pregnancy  Prenatal vitamins provide some of the extra vitamins and minerals you need  Prenatal vitamins may also help to decrease the risk of certain birth defects  · Talk to your healthcare provider about exercise  Moderate exercise can help you stay fit  Your healthcare provider will help you plan an exercise program that is safe for you during pregnancy  · Do not smoke  If you smoke, it is never too late to quit  Smoking increases your risk of a miscarriage and other health problems during your pregnancy  Smoking can cause your baby to be born too early or weigh less at birth  Ask your healthcare provider for information if you need help quitting  · Do not drink alcohol    Alcohol passes from your body to your baby through the placenta  It can affect your baby's brain development and cause fetal alcohol syndrome (FAS)  FAS is a group of conditions that causes mental, behavior, and growth problems  · Talk to your healthcare provider before you take any medicines  Many medicines may harm your baby if you take them when you are pregnant  Do not take any medicines, vitamins, herbs, or supplements without first talking to your healthcare provider  Never use illegal or street drugs (such as marijuana or cocaine) while you are pregnant  What are some safety tips during pregnancy? · Avoid hot tubs and saunas  Do not use a hot tub or sauna while you are pregnant, especially during your first trimester  Hot tubs and saunas may raise your baby's temperature and increase the risk of birth defects  · Avoid toxoplasmosis  This is an infection caused by eating raw meat or being around infected cat feces  It can cause birth defects, miscarriages, and other problems  Wash your hands after you touch raw meat  Make sure any meat is well-cooked before you eat it  Avoid raw eggs and unpasteurized milk  Use gloves or ask someone else to clean your cat's litter box while you are pregnant  What changes are happening with my baby? By 22 weeks, your baby is about 8 inches long from the top of the head to the rump (baby's bottom)  Your baby also weighs about 1 pound  Your baby is becoming much more active  You may be able to feel the baby move inside you now  The first movements may not be that noticeable  They may feel like a fluttering sensation  As time goes on, your baby's movements will become stronger and more noticeable  What do I need to know about prenatal care? During the first 28 weeks of your pregnancy, you will see your healthcare provider once a month  Your healthcare provider will check your blood pressure and weight  You may also need the following:  · A urine test  may also be done to check for sugar and protein   These can be signs of gestational diabetes or infection  Protein in your urine may also be a sign of preeclampsia  Preeclampsia is a condition that can develop during week 20 or later of your pregnancy  It causes high blood pressure, and it can cause problems with your kidneys and other organs  · Fundal height  is a measurement of your uterus to check your baby's growth  This number is usually the same as the number of weeks that you have been pregnant  · A fetal ultrasound  shows pictures of your baby inside your uterus  It shows your baby's development  The movement and position of your baby can also be seen  Your healthcare provider may be able to tell you what your baby's gender is during the ultrasound  · Your baby's heart rate  will be checked  When should I seek immediate care? · You develop a severe headache that does not go away  · You have new or increased vision changes, such as blurred or spotted vision  · You have new or increased swelling in your face or hands  · You have vaginal spotting or bleeding  · Your water broke or you feel warm water gushing or trickling from your vagina  When should I contact my healthcare provider? · You have abdominal cramps, pressure, or tightening  · You have a change in vaginal discharge  · You cannot keep food or drinks down, and you are losing weight  · You have chills or a fever  · You have vaginal itching, burning, or pain  · You have yellow, green, white, or foul-smelling vaginal discharge  · You have pain or burning when you urinate, less urine than usual, or pink or bloody urine  · You have questions or concerns about your condition or care  CARE AGREEMENT:   You have the right to help plan your care  Learn about your health condition and how it may be treated  Discuss treatment options with your caregivers to decide what care you want to receive  You always have the right to refuse treatment   The above information is an  only  It is not intended as medical advice for individual conditions or treatments  Talk to your doctor, nurse or pharmacist before following any medical regimen to see if it is safe and effective for you  © 2017 [address] Information is for End User's use only and may not be sold, redistributed or otherwise used for commercial purposes  All illustrations and images included in CareNotes® are the copyrighted property of A D A M [employer]  or [employer]  Pregnancy at 15 to 18 Weeks   [address]:   What changes are happening in my body? Now that you are in your second trimester, you have more energy  You may also feel hungrier than usual  You may start to experience other symptoms, such as heartburn or dizziness  You may be gaining about ½ to 1 pound a week, and your pregnancy is beginning to show  You may need to start wearing maternity clothes  How do I care for myself at this stage of my pregnancy? · Manage heartburn  by eating 4 or 5 small meals each day instead of large meals  Avoid spicy foods  Avoid eating right before bedtime  · Manage nausea and vomiting  Avoid fatty and spicy foods  Eat small meals throughout the day instead of large meals  Chelle may help to decrease nausea  Ask your healthcare provider about other ways of decreasing nausea and vomiting  · Eat a variety of healthy foods  Healthy foods include fruits, vegetables, whole-grain breads, low-fat dairy foods, beans, lean meats, and fish  Drink liquids as directed  Ask how much liquid to drink each day and which liquids are best for you  Limit caffeine to less than 200 milligrams each day  Limit your intake of fish to 2 servings each week  Choose fish low in mercury such as canned light tuna, shrimp, salmon, cod, or tilapia  Do not  eat fish high in mercury such as swordfish, tilefish, jackie mackerel, and shark  · Take prenatal vitamins as directed    Your need for certain vitamins and minerals, such as folic acid, increases during pregnancy  Prenatal vitamins provide some of the extra vitamins and minerals you need  Prenatal vitamins may also help to decrease the risk of certain birth defects  · Do not smoke  If you smoke, it is never too late to quit  Smoking increases your risk of a miscarriage and other health problems during your pregnancy  Smoking can cause your baby to be born too early or weigh less at birth  Ask your healthcare provider for information if you need help quitting  · Do not drink alcohol  Alcohol passes from your body to your baby through the placenta  It can affect your baby's brain development and cause fetal alcohol syndrome (FAS)  FAS is a group of conditions that causes mental, behavior, and growth problems  · Talk to your healthcare provider before you take any medicines  Many medicines may harm your baby if you take them when you are pregnant  Do not take any medicines, vitamins, herbs, or supplements without first talking to your healthcare provider  Never use illegal or street drugs (such as marijuana or cocaine) while you are pregnant  What are some safety tips during pregnancy? · Avoid hot tubs and saunas  Do not use a hot tub or sauna while you are pregnant, especially during your first trimester  Hot tubs and saunas may raise your baby's temperature and increase the risk of birth defects  · Avoid toxoplasmosis  This is an infection caused by eating raw meat or being around infected cat feces  It can cause birth defects, miscarriages, and other problems  Wash your hands after you touch raw meat  Make sure any meat is well-cooked before you eat it  Avoid raw eggs and unpasteurized milk  Use gloves or ask someone else to clean your cat's litter box while you are pregnant  What changes are happening with my baby? By 18 weeks, your baby may be about 6 inches long from the top of the head to the rump (baby's bottom)   Your baby may weigh about 11 ounces  You may be able to feel your baby's movement at about 18 weeks or later  The first movements may not be that noticeable  They may feel like a fluttering sensation  Your baby also makes sucking movements and can hear certain sounds  What do I need to know about prenatal care? During the first 28 weeks of your pregnancy, you will see your healthcare provider once a month  Your healthcare provider will check your blood pressure and weight  You may also need any of the following:  · A urine test  may also be done to check for sugar and protein  These can be signs of gestational diabetes or infection  · A blood test  may be done to check for anemia (low iron level)  · Fundal height check  is a measurement of your uterus to check your baby's growth  This number is usually the same as the number of weeks that you have been pregnant  · An ultrasound  may be done to check your baby's development  Your healthcare provider may be able to tell you what your baby's gender is during the ultrasound  · Your baby's heart rate  will be checked  When should I seek immediate care? · You have pain or cramping in your abdomen or low back  · You have heavy vaginal bleeding or clotting  · You pass material that looks like tissue or large clots  Collect the material and bring it with you  When should I contact my healthcare provider? · You cannot keep food or drinks down, and you are losing weight  · You have light bleeding  · You have chills or a fever  · You have vaginal itching, burning, or pain  · You have yellow, green, white, or foul-smelling vaginal discharge  · You have pain or burning when you urinate, less urine than usual, or pink or bloody urine  · You have questions or concerns about your condition or care  CARE AGREEMENT:   You have the right to help plan your care  Learn about your health condition and how it may be treated   Discuss treatment options with your caregivers to decide what care you want to receive  You always have the right to refuse treatment  The above information is an  only  It is not intended as medical advice for individual conditions or treatments  Talk to your doctor, nurse or pharmacist before following any medical regimen to see if it is safe and effective for you  © 2017 [address] Information is for End User's use only and may not be sold, redistributed or otherwise used for commercial purposes  All illustrations and images included in CareNotes® are the copyrighted property of A D A M , Inc  or [employer]

## 2019-08-20 ENCOUNTER — PATIENT OUTREACH (OUTPATIENT)
Dept: OBGYN CLINIC | Facility: CLINIC | Age: 19
End: 2019-08-20

## 2019-08-20 ENCOUNTER — ROUTINE PRENATAL (OUTPATIENT)
Dept: OBGYN CLINIC | Facility: CLINIC | Age: 19
End: 2019-08-20

## 2019-08-20 VITALS
SYSTOLIC BLOOD PRESSURE: 109 MMHG | HEART RATE: 90 BPM | WEIGHT: 104 LBS | BODY MASS INDEX: 23.39 KG/M2 | DIASTOLIC BLOOD PRESSURE: 71 MMHG | HEIGHT: 56 IN

## 2019-08-20 DIAGNOSIS — Z3A.16 16 WEEKS GESTATION OF PREGNANCY: Primary | ICD-10-CM

## 2019-08-20 DIAGNOSIS — F41.9 ANXIETY IN PREGNANCY IN SECOND TRIMESTER, ANTEPARTUM: ICD-10-CM

## 2019-08-20 DIAGNOSIS — O99.342 ANXIETY IN PREGNANCY IN SECOND TRIMESTER, ANTEPARTUM: ICD-10-CM

## 2019-08-20 PROCEDURE — 99213 OFFICE O/P EST LOW 20 MIN: CPT | Performed by: NURSE PRACTITIONER
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
## 2019-08-20 NOTE — PROGRESS NOTES
EARNESTINE met with Pt for follow up  Pt states is doing well  Still waiting for MA  Pt denies concern at this time  Taking her prenatal vitamins  States is controlling the anxiety with deep breathing  Denies other concern at this time

## 2019-08-25 ENCOUNTER — HOSPITAL ENCOUNTER (OUTPATIENT)
Facility: HOSPITAL | Age: 19
Discharge: HOME | End: 2019-08-25
Attending: OBSTETRICS & GYNECOLOGY | Admitting: OBSTETRICS & GYNECOLOGY
Payer: COMMERCIAL

## 2019-08-25 VITALS
SYSTOLIC BLOOD PRESSURE: 118 MMHG | RESPIRATION RATE: 18 BRPM | DIASTOLIC BLOOD PRESSURE: 67 MMHG | WEIGHT: 104 LBS | HEART RATE: 90 BPM | BODY MASS INDEX: 23.39 KG/M2 | HEIGHT: 56 IN | TEMPERATURE: 98 F

## 2019-08-25 PROBLEM — Z3A.17 17 WEEKS GESTATION OF PREGNANCY: Status: ACTIVE | Noted: 2019-06-25

## 2019-08-25 PROCEDURE — 99213 OFFICE O/P EST LOW 20 MIN: CPT

## 2019-08-25 PROCEDURE — NC001 PR NO CHARGE: Performed by: OBSTETRICS & GYNECOLOGY
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
## 2019-08-26 ENCOUNTER — APPOINTMENT (OUTPATIENT)
Dept: LAB | Facility: HOSPITAL | Age: 19
End: 2019-08-26
Payer: COMMERCIAL

## 2019-08-26 DIAGNOSIS — Z3A.16 16 WEEKS GESTATION OF PREGNANCY: ICD-10-CM

## 2019-08-26 PROCEDURE — 82677 ASSAY OF ESTRIOL: CPT

## 2019-08-26 PROCEDURE — 86336 INHIBIN A: CPT

## 2019-08-26 PROCEDURE — 82105 ALPHA-FETOPROTEIN SERUM: CPT

## 2019-08-26 PROCEDURE — 84702 CHORIONIC GONADOTROPIN TEST: CPT

## 2019-08-29 LAB
2ND TRIMESTER 4 SCREEN SERPL-IMP: NORMAL
2ND TRIMESTER 4 SCREEN SERPL-IMP: NORMAL
AFP ADJ MOM SERPL: 1.31
AFP SERPL-MCNC: 47.2 NG/ML
AGE AT DELIVERY: 21.5 YR
FET TS 18 RISK FROM MAT AGE: NORMAL
FET TS 21 RISK FROM MAT AGE: 1139
GA METHOD: NORMAL
GA: 16.6 WEEKS
HCG ADJ MOM SERPL: 0.63
HCG SERPL-ACNC: NORMAL MIU/ML
IDDM PATIENT QL: YES
INHIBIN A ADJ MOM SERPL: 1.1
INHIBIN A SERPL-MCNC: 240.5 PG/ML
KARYOTYP BLD/T: NORMAL
MULTIPLE PREGNANCY: NO
NEURAL TUBE DEFECT RISK FETUS: 1392 %
SERVICE CMNT-IMP: NORMAL
TS 18 RISK FETUS: NORMAL
TS 21 RISK FETUS: NORMAL
U ESTRIOL ADJ MOM SERPL: 1.17
U ESTRIOL SERPL-MCNC: 1.25 NG/ML

## 2019-09-04 ENCOUNTER — DOCUMENTATION (OUTPATIENT)
Dept: OBGYN CLINIC | Facility: CLINIC | Age: 19
End: 2019-09-04

## 2019-09-04 NOTE — PROGRESS NOTES
Pt walked into office with complaints of cramping, she reports that she is 5 months pregnant  Pt is very uncomfortable and holding/rubbing her abdomen  Pt reports she walked here from class next door to the office  Pt reports LOF yesterday, reports she feels good fetal movement, she denies any vaginal bleeding but reports that abdomen gets hard and painful  Pt placed in wheelchair  We asked OK to call 911 pt consents  Denies any family members or friends that we can call to inform  Pt getting progressively more uncomfortable and states she feels like she has to have a bowel movement and push  Pt's breathing is labored  2 ambulance personnel arrive and pt left in ambulance

## 2019-09-06 ENCOUNTER — DOCUMENTATION (OUTPATIENT)
Dept: OBGYN CLINIC | Facility: CLINIC | Age: 19
End: 2019-09-06

## 2019-09-06 ENCOUNTER — PATIENT OUTREACH (OUTPATIENT)
Dept: OBGYN CLINIC | Facility: CLINIC | Age: 19
End: 2019-09-06

## 2019-09-06 NOTE — PROGRESS NOTES
Spoke with Jackie Kasper   this morning to reach out to patient concerning recent visit to office and  hx  anxiety, social history  to see if she needs further assistance

## 2019-09-09 ENCOUNTER — ROUTINE PRENATAL (OUTPATIENT)
Dept: OBGYN CLINIC | Facility: CLINIC | Age: 19
End: 2019-09-09

## 2019-09-09 ENCOUNTER — PATIENT OUTREACH (OUTPATIENT)
Dept: OBGYN CLINIC | Facility: CLINIC | Age: 19
End: 2019-09-09

## 2019-09-09 VITALS
SYSTOLIC BLOOD PRESSURE: 93 MMHG | HEART RATE: 90 BPM | BODY MASS INDEX: 25.33 KG/M2 | HEIGHT: 56 IN | WEIGHT: 112.6 LBS | DIASTOLIC BLOOD PRESSURE: 64 MMHG

## 2019-09-09 DIAGNOSIS — R56.9 SEIZURE (HCC): ICD-10-CM

## 2019-09-09 DIAGNOSIS — A74.9 CHLAMYDIA INFECTION AFFECTING PREGNANCY IN FIRST TRIMESTER: ICD-10-CM

## 2019-09-09 DIAGNOSIS — O98.811 CHLAMYDIA INFECTION AFFECTING PREGNANCY IN FIRST TRIMESTER: ICD-10-CM

## 2019-09-09 DIAGNOSIS — F41.9 ANXIETY: ICD-10-CM

## 2019-09-09 DIAGNOSIS — F41.9 ANXIETY IN PREGNANCY IN SECOND TRIMESTER, ANTEPARTUM: ICD-10-CM

## 2019-09-09 DIAGNOSIS — Z87.898 HISTORY OF DOMESTIC VIOLENCE: ICD-10-CM

## 2019-09-09 DIAGNOSIS — O21.9 NAUSEA AND VOMITING IN PREGNANCY: ICD-10-CM

## 2019-09-09 DIAGNOSIS — O99.342 ANXIETY IN PREGNANCY IN SECOND TRIMESTER, ANTEPARTUM: ICD-10-CM

## 2019-09-09 DIAGNOSIS — Z3A.19 19 WEEKS GESTATION OF PREGNANCY: Primary | ICD-10-CM

## 2019-09-09 PROCEDURE — 99213 OFFICE O/P EST LOW 20 MIN: CPT | Performed by: OBSTETRICS & GYNECOLOGY

## 2019-09-09 RX ORDER — DIPHENHYDRAMINE HYDROCHLORIDE 25 MG/1
25 CAPSULE ORAL DAILY
Qty: 30 TABLET | Refills: 3 | Status: ON HOLD | OUTPATIENT
Start: 2019-09-09 | End: 2019-12-20

## 2019-09-09 RX ORDER — SERTRALINE HYDROCHLORIDE 25 MG/1
25 TABLET, FILM COATED ORAL DAILY
Qty: 30 TABLET | Refills: 2 | Status: SHIPPED | OUTPATIENT
Start: 2019-09-09 | End: 2019-11-22
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
## 2019-09-09 NOTE — PATIENT INSTRUCTIONS
Pregnancy at 23 to 22 [address]:   Now that you are in your second trimester, you have more energy  You may also be feeling hungrier than usual  You may be gaining about ½ to 1 pound a week, and your pregnancy is beginning to show  You may need to start wearing maternity clothes  As your baby gets larger, you may have other symptoms  These may include body aches or stretch marks on your abdomen, breasts, thighs, or buttocks  DISCHARGE INSTRUCTIONS:   Seek care immediately if:   · You develop a severe headache that does not go away  · You have new or increased vision changes, such as blurred or spotted vision  · You have new or increased swelling in your face or hands  · You have vaginal spotting or bleeding  · Your water broke or you feel warm water gushing or trickling from your vagina  Contact your healthcare provider if:   · You have abdominal cramps, pressure, or tightening  · You have a change in vaginal discharge  · You cannot keep food or drinks down, and you are losing weight  · You have chills or a fever  · You have vaginal itching, burning, or pain  · You have yellow, green, white, or foul-smelling vaginal discharge  · You have pain or burning when you urinate, less urine than usual, or pink or bloody urine  · You have questions or concerns about your condition or care  How to care for yourself at this stage of your pregnancy:   · Eat a variety of healthy foods  Healthy foods include fruits, vegetables, whole-grain breads, low-fat dairy foods, beans, lean meats, and fish  Drink liquids as directed  Ask how much liquid to drink each day and which liquids are best for you  Limit caffeine to less than 200 milligrams each day  Limit your intake of fish to 2 servings each week  Choose fish low in mercury such as canned light tuna, shrimp, salmon, cod, or tilapia  Do not  eat fish high in mercury such as swordfish, tilefish, jackie mackerel, and shark  · Take prenatal vitamins as directed  Your need for certain vitamins and minerals, such as folic acid, increases during pregnancy  Prenatal vitamins provide some of the extra vitamins and minerals you need  Prenatal vitamins may also help to decrease the risk of certain birth defects  · Talk to your healthcare provider about exercise  Moderate exercise can help you stay fit  Your healthcare provider will help you plan an exercise program that is safe for you during pregnancy  · Do not smoke  If you smoke, it is never too late to quit  Smoking increases your risk of a miscarriage and other health problems during your pregnancy  Smoking can cause your baby to be born too early or weigh less at birth  Ask your healthcare provider for information if you need help quitting  · Do not drink alcohol  Alcohol passes from your body to your baby through the placenta  It can affect your baby's brain development and cause fetal alcohol syndrome (FAS)  FAS is a group of conditions that causes mental, behavior, and growth problems  · Talk to your healthcare provider before you take any medicines  Many medicines may harm your baby if you take them when you are pregnant  Do not take any medicines, vitamins, herbs, or supplements without first talking to your healthcare provider  Never use illegal or street drugs (such as marijuana or cocaine) while you are pregnant  Safety tips during pregnancy:   · Avoid hot tubs and saunas  Do not use a hot tub or sauna while you are pregnant, especially during your first trimester  Hot tubs and saunas may raise your baby's temperature and increase the risk of birth defects  · Avoid toxoplasmosis  This is an infection caused by eating raw meat or being around infected cat feces  It can cause birth defects, miscarriages, and other problems  Wash your hands after you touch raw meat  Make sure any meat is well-cooked before you eat it   Avoid raw eggs and unpasteurized milk  Use gloves or ask someone else to clean your cat's litter box while you are pregnant  Changes that are happening with your baby:  By 22 weeks, your baby is about 8 inches long from the top of the head to the rump (baby's bottom)  Your baby also weighs about 1 pound  Your baby is becoming much more active  You may be able to feel the baby move inside you now  The first movements may not be that noticeable  They may feel like a fluttering sensation  As time goes on, your baby's movements will become stronger and more noticeable  What you need to know about prenatal care:  During the first 28 weeks of your pregnancy, you will see your healthcare provider once a month  Your healthcare provider will check your blood pressure and weight  You may also need the following:  · A urine test  may also be done to check for sugar and protein  These can be signs of gestational diabetes or infection  Protein in your urine may also be a sign of preeclampsia  Preeclampsia is a condition that can develop during week 20 or later of your pregnancy  It causes high blood pressure, and it can cause problems with your kidneys and other organs  · Fundal height  is a measurement of your uterus to check your baby's growth  This number is usually the same as the number of weeks that you have been pregnant  · A fetal ultrasound  shows pictures of your baby inside your uterus  It shows your baby's development  The movement and position of your baby can also be seen  Your healthcare provider may be able to tell you what your baby's gender is during the ultrasound  · Your baby's heart rate  will be checked  © 2017 [address] Information is for End User's use only and may not be sold, redistributed or otherwise used for commercial purposes  All illustrations and images included in CareNotes® are the copyrighted property of [employer]  or [employer]    The above information is an  only  It is not intended as medical advice for individual conditions or treatments  Talk to your doctor, nurse or pharmacist before following any medical regimen to see if it is safe and effective for you
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
## 2019-09-10 ENCOUNTER — PATIENT OUTREACH (OUTPATIENT)
Dept: OBGYN CLINIC | Facility: CLINIC | Age: 19
End: 2019-09-10

## 2019-09-10 NOTE — PROGRESS NOTES
EARNESTINE recv'd a phone call from Pt requesting housing information  Pt reported she was discharge from [employer] [facility] last night  She is currently staying with her father  Pt states she can stay there but will preferred to move out  EARNESTINE discussed [facility] [address]  Pt states she called [facility] and it is a waiting list and [address] has not return her call  SW advice to call and sign her name at [facility] waiting list and to call [facility] again  Also advice to apply at [address]  Pt verbalized understanding  Pt will try today to access MH  Will contact SW if help is needed  EARNESTINE spoke with Donovan Umana RN from P to discussed Pt current needs  She will follow her at dad's home

## 2019-09-13 ENCOUNTER — ROUTINE PRENATAL (OUTPATIENT)
Dept: PERINATAL CARE | Facility: CLINIC | Age: 19
End: 2019-09-13
Payer: COMMERCIAL

## 2019-09-13 VITALS
WEIGHT: 112 LBS | DIASTOLIC BLOOD PRESSURE: 62 MMHG | SYSTOLIC BLOOD PRESSURE: 95 MMHG | HEIGHT: 56 IN | HEART RATE: 97 BPM | BODY MASS INDEX: 25.19 KG/M2

## 2019-09-13 DIAGNOSIS — Z36.86 ENCOUNTER FOR ANTENATAL SCREENING FOR CERVICAL LENGTH: ICD-10-CM

## 2019-09-13 DIAGNOSIS — Z3A.20 20 WEEKS GESTATION OF PREGNANCY: ICD-10-CM

## 2019-09-13 DIAGNOSIS — O98.811 CHLAMYDIA INFECTION AFFECTING PREGNANCY IN FIRST TRIMESTER: ICD-10-CM

## 2019-09-13 DIAGNOSIS — Z87.898 HISTORY OF DOMESTIC VIOLENCE: ICD-10-CM

## 2019-09-13 DIAGNOSIS — Z36.3 ENCOUNTER FOR ANTENATAL SCREENING FOR MALFORMATION USING ULTRASOUND: Primary | ICD-10-CM

## 2019-09-13 DIAGNOSIS — F41.9 ANXIETY: ICD-10-CM

## 2019-09-13 DIAGNOSIS — Z3A.19 19 WEEKS GESTATION OF PREGNANCY: ICD-10-CM

## 2019-09-13 DIAGNOSIS — A74.9 CHLAMYDIA INFECTION AFFECTING PREGNANCY IN FIRST TRIMESTER: ICD-10-CM

## 2019-09-13 PROCEDURE — 76817 TRANSVAGINAL US OBSTETRIC: CPT | Performed by: OBSTETRICS & GYNECOLOGY

## 2019-09-13 PROCEDURE — 99212 OFFICE O/P EST SF 10 MIN: CPT | Performed by: OBSTETRICS & GYNECOLOGY

## 2019-09-13 PROCEDURE — 76805 OB US >/= 14 WKS SNGL FETUS: CPT | Performed by: OBSTETRICS & GYNECOLOGY

## 2019-09-13 NOTE — PROGRESS NOTES
The patient was seen today for an ultrasound  Please see ultrasound report (located under Ob Procedures) for additional details  Thank you very much for allowing us to participate in the care of this very nice patient  Should you have any questions, please do not hesitate to contact me  Noel Montes MD [phone]  Attending Physician, S[place]

## 2019-09-13 NOTE — PROGRESS NOTES
A transvaginal ultrasound was performed  Sonographer note on use of High Level Disinfection Process (Trophon) for transvaginal probe#4 used, serial [id number]    Corinne Slice, RDMS

## 2019-09-23 ENCOUNTER — ROUTINE PRENATAL (OUTPATIENT)
Dept: OBGYN CLINIC | Facility: CLINIC | Age: 19
End: 2019-09-23

## 2019-09-23 ENCOUNTER — PATIENT OUTREACH (OUTPATIENT)
Dept: OBGYN CLINIC | Facility: CLINIC | Age: 19
End: 2019-09-23

## 2019-09-23 VITALS
SYSTOLIC BLOOD PRESSURE: 94 MMHG | BODY MASS INDEX: 26.54 KG/M2 | HEIGHT: 56 IN | WEIGHT: 118 LBS | HEART RATE: 99 BPM | DIASTOLIC BLOOD PRESSURE: 65 MMHG

## 2019-09-23 DIAGNOSIS — O99.342 ANXIETY IN PREGNANCY IN SECOND TRIMESTER, ANTEPARTUM: Primary | ICD-10-CM

## 2019-09-23 DIAGNOSIS — Z3A.21 21 WEEKS GESTATION OF PREGNANCY: ICD-10-CM

## 2019-09-23 DIAGNOSIS — F41.9 ANXIETY IN PREGNANCY IN SECOND TRIMESTER, ANTEPARTUM: Primary | ICD-10-CM

## 2019-09-23 PROCEDURE — 99213 OFFICE O/P EST LOW 20 MIN: CPT | Performed by: NURSE PRACTITIONER

## 2019-09-23 PROCEDURE — 90686 IIV4 VACC NO PRSV 0.5 ML IM: CPT | Performed by: NURSE PRACTITIONER

## 2019-09-23 PROCEDURE — 90471 IMMUNIZATION ADMIN: CPT | Performed by: NURSE PRACTITIONER

## 2019-09-23 NOTE — PATIENT INSTRUCTIONS
Sertraline (By mouth)   Sertraline (SER-tra-adeola)  Treats depression, obsessive-compulsive disorder (OCD), posttraumatic stress disorder (PTSD), premenstrual dysphoric disorder (PMDD), social anxiety disorder, and panic disorder  This medicine is an SSRI  Brand Name(s): Zoloft   There may be other brand names for this medicine  When This Medicine Should Not Be Used: This medicine is not right for everyone  Do not use it if you had an allergic reaction to sertraline  How to Use This Medicine:   Liquid, Tablet  · Take your medicine as directed  Your dose may need to be changed several times to find what works best for you  You may need to take it for a few weeks or months before you feel better  · Oral liquid: Use the dropper provided to remove the medicine and mix it with 1/2 cup (4 ounces) of water, ginger ale, lemon-lime soda, lemonade, or orange juice  Drink the mixture right away  It is normal for it to look a bit hazy  · This medicine should come with a Medication Guide  Ask your pharmacist for a copy if you do not have one  · Missed dose: Take a dose as soon as you remember  If it is almost time for your next dose, wait until then and take a regular dose  Do not take extra medicine to make up for a missed dose  · Store the medicine in a closed container at room temperature, away from heat, moisture, and direct light  Drugs and Foods to Avoid:   Ask your doctor or pharmacist before using any other medicine, including over-the-counter medicines, vitamins, and herbal products  · Do not use this medicine together with pimozide  Do not use this medicine and an MAO inhibitor (MAOI) within 14 days of each other  Do not use the oral liquid form of sertraline if you are also using disulfiram   · Some medicines can affect how sertraline works   Tell your doctor if you are using the following:   ¨ Buspirone, cimetidine, cisapride, diazepam, digitoxin, fentanyl, flecainide, lithium, phenytoin, propafenone, St Vj's wort, tramadol, tryptophan supplements, or valproate  ¨ A blood thinner (such as warfarin), a diuretic (water pill), an NSAID pain or arthritis medicine (such as aspirin, diclofenac, ibuprofen), a tricyclic antidepressant, a triptan medicine for migraine headaches  · Do not drink alcohol while you are using this medicine  Warnings While Using This Medicine:   · Tell your doctor if you are pregnant or breastfeeding, or if you have liver disease, bleeding problems, glaucoma, heart disease, or a seizure disorder  · For some children, teenagers, and young adults, this medicine may increase mental or emotional problems  This may lead to thoughts of suicide and violence  Talk with your doctor right away if you have any thoughts or behavior changes that concern you  Tell your doctor if you or anyone in your family has a history of bipolar disorder or suicide attempts  · This medicine may cause the following problems:   ¨ Serotonin syndrome (when taken with certain medicines)  ¨ Low sodium levels (more common in elderly patients and those who take diuretics or become dehydrated)  · Tell your doctor if you are sensitive to latex, because the oral liquid comes with a latex rubber dropper  · This medicine may make you dizzy or drowsy  Do not drive or do anything that could be dangerous until you know how this medicine affects you  · Do not stop using this medicine suddenly  Your doctor will need to slowly decrease your dose before you stop it completely  · Your doctor will check your progress and the effects of this medicine at regular visits  Keep all appointments  · Keep all medicine out of the reach of children  Never share your medicine with anyone    Possible Side Effects While Using This Medicine:   Call your doctor right away if you notice any of these side effects:  · Allergic reaction: Itching or hives, swelling in your face or hands, swelling or tingling in your mouth or throat, chest tightness, trouble breathing  · Anxiety, restlessness, fast heartbeat, fever, sweating, muscle spasms, twitching, nausea, vomiting, diarrhea, seeing or hearing things that are not there  · Blistering, peeling, or red skin rash  · Confusion, weakness, and muscle twitching  · Eye pain, vision changes, seeing halos around lights  · Feeling more excited or energetic than usual  · Thoughts of hurting yourself or others, unusual behavior  · Unusual bleeding or bruising  If you notice these less serious side effects, talk with your doctor:   · Dry mouth  · Loss of appetite, weight loss  · Mild diarrhea, constipation, nausea, vomiting  · Sexual problems  · Sleepiness, or trouble sleeping  If you notice other side effects that you think are caused by this medicine, tell your doctor  Call your doctor for medical advice about side effects  You may report side effects to FDA at 3-157-FDA-3569  © 2017 [address] [employer] Information is for End User's use only and may not be sold, redistributed or otherwise used for commercial purposes  The above information is an  only  It is not intended as medical advice for individual conditions or treatments  Talk to your doctor, nurse or pharmacist before following any medical regimen to see if it is safe and effective for you  Pregnancy at 23 to 26 [address]:   What changes are happening in my body? You are now close to or at the beginning of the third trimester  The third trimester starts at 24 weeks and ends with delivery  As your baby gets larger, you may develop certain symptoms  These may include pain in your back or down the sides of your abdomen  You may also have stretch marks on your abdomen, breasts, thighs, or buttocks  You may also have constipation  How do I care for myself at this stage of my pregnancy? · Eat a variety of healthy foods    Healthy foods include fruits, vegetables, whole-grain breads, low-fat dairy foods, beans, lean meats, and fish  Drink liquids as directed  Ask how much liquid to drink each day and which liquids are best for you  Limit caffeine to less than 200 milligrams each day  Limit your intake of fish to 2 servings each week  Choose fish low in mercury such as canned light tuna, shrimp, salmon, cod, or tilapia  Do not  eat fish high in mercury such as swordfish, tilefish, jackie mackerel, and shark  · Manage back pain  Do not stand for long periods of time or lift heavy items  Use good posture while you stand, squat, or bend  Wear low-heeled shoes with good support  Rest may also help to relieve back pain  Ask your healthcare provider about exercises you can do to strengthen your back muscles  · Take prenatal vitamins as directed  Your need for certain vitamins and minerals, such as folic acid, increases during pregnancy  Prenatal vitamins provide some of the extra vitamins and minerals you need  Prenatal vitamins may also help to decrease the risk of certain birth defects  · Talk to your healthcare provider about exercise  Moderate exercise can help you stay fit  Your healthcare provider will help you plan an exercise program that is safe for you during pregnancy  · Do not smoke  If you smoke, it is never too late to quit  Smoking increases your risk of a miscarriage and other health problems during your pregnancy  Smoking can cause your baby to be born too early or weigh less at birth  Ask your healthcare provider for information if you need help quitting  · Do not drink alcohol  Alcohol passes from your body to your baby through the placenta  It can affect your baby's brain development and cause fetal alcohol syndrome (FAS)  FAS is a group of conditions that causes mental, behavior, and growth problems  · Talk to your healthcare provider before you take any medicines  Many medicines may harm your baby if you take them when you are pregnant   Do not take any medicines, vitamins, herbs, or supplements without first talking to your healthcare provider  Never use illegal or street drugs (such as marijuana or cocaine) while you are pregnant  What are some safety tips during pregnancy? · Avoid hot tubs and saunas  Do not use a hot tub or sauna while you are pregnant, especially during your first trimester  Hot tubs and saunas may raise your baby's temperature and increase the risk of birth defects  · Avoid toxoplasmosis  This is an infection caused by eating raw meat or being around infected cat feces  It can cause birth defects, miscarriages, and other problems  Wash your hands after you touch raw meat  Make sure any meat is well-cooked before you eat it  Avoid raw eggs and unpasteurized milk  Use gloves or ask someone else to clean your cat's litter box while you are pregnant  What changes are happening with my baby? By 26 weeks, your baby will weigh about 2 pounds  Your baby will be about 10 inches long from the top of the head to the rump (baby's bottom)  Your baby's movements are much stronger now  Your baby's eyes are almost completely formed and can partially open  Your baby also sleeps and wakes up  What do I need to know about prenatal care? Your healthcare provider will check your blood pressure and weight  You may also need the following:  · A urine test  may also be done to check for sugar and protein  These can be signs of gestational diabetes or infection  Protein in your urine may also be a sign of preeclampsia  Preeclampsia is a condition that can develop during week 20 or later of your pregnancy  It causes high blood pressure, and it can cause problems with your kidneys and other organs  · Fundal height  is a measurement of your uterus to check your baby's growth  This number is usually the same as the number of weeks that you have been pregnant  · Your baby's heart rate  will be checked  When should I seek immediate care?    · You develop a severe headache that does not go away     · You have new or increased vision changes, such as blurred or spotted vision  · You have new or increased swelling in your face or hands  · You have vaginal spotting or bleeding  · Your water broke or you feel warm water gushing or trickling from your vagina  When should I contact my healthcare provider? · You have abdominal cramps, pressure, or tightening  · You have a change in vaginal discharge  · You have light bleeding  · You have chills or a fever  · You have vaginal itching, burning, or pain  · You have yellow, green, white, or foul-smelling vaginal discharge  · You have pain or burning when you urinate, less urine than usual, or pink or bloody urine  · You have questions or concerns about your condition or care  CARE AGREEMENT:   You have the right to help plan your care  Learn about your health condition and how it may be treated  Discuss treatment options with your caregivers to decide what care you want to receive  You always have the right to refuse treatment  The above information is an  only  It is not intended as medical advice for individual conditions or treatments  Talk to your doctor, nurse or pharmacist before following any medical regimen to see if it is safe and effective for you  © 2017 [address] [facility] Information is for End User's use only and may not be sold, redistributed or otherwise used for commercial purposes  All illustrations and images included in CareNotes® are the copyrighted property of [employer]  or [employer]  Pregnancy at 23 to 22 [address] [place] [address]:   What changes are happening in my body? Now that you are in your second trimester, you have more energy  You may also be feeling hungrier than usual  You may be gaining about ½ to 1 pound a week, and your pregnancy is beginning to show  You may need to start wearing maternity clothes   As your baby gets larger, you may have other symptoms  These may include body aches or stretch marks on your abdomen, breasts, thighs, or buttocks  How do I care for myself at this stage of my pregnancy? · Eat a variety of healthy foods  Healthy foods include fruits, vegetables, whole-grain breads, low-fat dairy foods, beans, lean meats, and fish  Drink liquids as directed  Ask how much liquid to drink each day and which liquids are best for you  Limit caffeine to less than 200 milligrams each day  Limit your intake of fish to 2 servings each week  Choose fish low in mercury such as canned light tuna, shrimp, salmon, cod, or tilapia  Do not  eat fish high in mercury such as swordfish, tilefish, jackie mackerel, and shark  · Take prenatal vitamins as directed  Your need for certain vitamins and minerals, such as folic acid, increases during pregnancy  Prenatal vitamins provide some of the extra vitamins and minerals you need  Prenatal vitamins may also help to decrease the risk of certain birth defects  · Talk to your healthcare provider about exercise  Moderate exercise can help you stay fit  Your healthcare provider will help you plan an exercise program that is safe for you during pregnancy  · Do not smoke  If you smoke, it is never too late to quit  Smoking increases your risk of a miscarriage and other health problems during your pregnancy  Smoking can cause your baby to be born too early or weigh less at birth  Ask your healthcare provider for information if you need help quitting  · Do not drink alcohol  Alcohol passes from your body to your baby through the placenta  It can affect your baby's brain development and cause fetal alcohol syndrome (FAS)  FAS is a group of conditions that causes mental, behavior, and growth problems  · Talk to your healthcare provider before you take any medicines  Many medicines may harm your baby if you take them when you are pregnant   Do not take any medicines, vitamins, herbs, or supplements without first talking to your healthcare provider  Never use illegal or street drugs (such as marijuana or cocaine) while you are pregnant  What are some safety tips during pregnancy? · Avoid hot tubs and saunas  Do not use a hot tub or sauna while you are pregnant, especially during your first trimester  Hot tubs and saunas may raise your baby's temperature and increase the risk of birth defects  · Avoid toxoplasmosis  This is an infection caused by eating raw meat or being around infected cat feces  It can cause birth defects, miscarriages, and other problems  Wash your hands after you touch raw meat  Make sure any meat is well-cooked before you eat it  Avoid raw eggs and unpasteurized milk  Use gloves or ask someone else to clean your cat's litter box while you are pregnant  What changes are happening with my baby? By 22 weeks, your baby is about 8 inches long from the top of the head to the rump (baby's bottom)  Your baby also weighs about 1 pound  Your baby is becoming much more active  You may be able to feel the baby move inside you now  The first movements may not be that noticeable  They may feel like a fluttering sensation  As time goes on, your baby's movements will become stronger and more noticeable  What do I need to know about prenatal care? During the first 28 weeks of your pregnancy, you will see your healthcare provider once a month  Your healthcare provider will check your blood pressure and weight  You may also need the following:  · A urine test  may also be done to check for sugar and protein  These can be signs of gestational diabetes or infection  Protein in your urine may also be a sign of preeclampsia  Preeclampsia is a condition that can develop during week 20 or later of your pregnancy  It causes high blood pressure, and it can cause problems with your kidneys and other organs  · Fundal height  is a measurement of your uterus to check your baby's growth   This number is usually the same as the number of weeks that you have been pregnant  · A fetal ultrasound  shows pictures of your baby inside your uterus  It shows your baby's development  The movement and position of your baby can also be seen  Your healthcare provider may be able to tell you what your baby's gender is during the ultrasound  · Your baby's heart rate  will be checked  When should I seek immediate care? · You develop a severe headache that does not go away  · You have new or increased vision changes, such as blurred or spotted vision  · You have new or increased swelling in your face or hands  · You have vaginal spotting or bleeding  · Your water broke or you feel warm water gushing or trickling from your vagina  When should I contact my healthcare provider? · You have abdominal cramps, pressure, or tightening  · You have a change in vaginal discharge  · You cannot keep food or drinks down, and you are losing weight  · You have chills or a fever  · You have vaginal itching, burning, or pain  · You have yellow, green, white, or foul-smelling vaginal discharge  · You have pain or burning when you urinate, less urine than usual, or pink or bloody urine  · You have questions or concerns about your condition or care  CARE AGREEMENT:   You have the right to help plan your care  Learn about your health condition and how it may be treated  Discuss treatment options with your caregivers to decide what care you want to receive  You always have the right to refuse treatment  The above information is an  only  It is not intended as medical advice for individual conditions or treatments  Talk to your doctor, nurse or pharmacist before following any medical regimen to see if it is safe and effective for you  © 2017 [employer] Information is for End User's use only and may not be sold, redistributed or otherwise used for commercial purposes   All illustrations and images included in CareNotes® are the copyrighted property of [employer]  or [employer]

## 2019-09-23 NOTE — PROGRESS NOTES
SW met with Pt for follow up  Pt present happy due to her new job  Pt states she is staying with her dad until delivery and then she will be referred by her employer to a housing program  Pt still working o mental health appointment  Reported is doing well  Denies depression signs, no SI / HI  Pt denies other concern at this time  SW provided brief supportive counseling  Pt will contact [facility]NE[facility] as needed

## 2019-09-23 NOTE — PROGRESS NOTES
Denies loss of fluid, vaginal bleeding and abdominal pain  Confirms frequent fetal movement  Tolerating prenatal vitamin well  Has not started taking Zoloft for depression/anxiety  Patient states she does not believe at this time she will begin taking medication  States she has intermittent anxiety and worsening depression but does not feel she needs to be medicated at this time  Denies SI/HI  Continues to live in shelter, significant other-[facility] continues to be incarcerated  No concerns at today's visit  Reviewed recommendations for influenza vaccine, patient is agreeable to administration today   Center ultrasound reviewed-19-vertex presentation, normal appearing fetal growth, anterior placenta, no placenta previa, AMELIA-WNL and EFW 11 oz  Recommendation for follow-up growth at 32 weeks  Plan:  1  Continue prenatal vitamin daily  2  Anxiety-control urge to continue lifestyle changes and coping skills  Declines social Work visit today  Encouraged to call office with worsening symptoms  Patient does not plan to begin Zoloft will continue to evaluate  3  32 week follow-up  Center as scheduled  4  Influenza vaccination today  5   Common discomforts of pregnancy and precautions reviewed  RTO 4 weeks

## 2019-10-04 ENCOUNTER — HOSPITAL ENCOUNTER (OUTPATIENT)
Facility: HOSPITAL | Age: 19
Discharge: HOME | End: 2019-10-04
Attending: OBSTETRICS & GYNECOLOGY | Admitting: OBSTETRICS & GYNECOLOGY
Payer: COMMERCIAL

## 2019-10-04 VITALS
DIASTOLIC BLOOD PRESSURE: 65 MMHG | HEART RATE: 100 BPM | TEMPERATURE: 98.3 F | OXYGEN SATURATION: 100 % | SYSTOLIC BLOOD PRESSURE: 108 MMHG | RESPIRATION RATE: 18 BRPM

## 2019-10-04 DIAGNOSIS — B96.89 BACTERIAL VAGINOSIS: Primary | ICD-10-CM

## 2019-10-04 DIAGNOSIS — N76.0 BACTERIAL VAGINOSIS: Primary | ICD-10-CM

## 2019-10-04 LAB
BACTERIA UR QL AUTO: ABNORMAL /HPF
BILIRUB UR QL STRIP: NEGATIVE
CLARITY UR: ABNORMAL
COLOR UR: YELLOW
GLUCOSE UR STRIP-MCNC: NEGATIVE MG/DL
HGB UR QL STRIP.AUTO: NEGATIVE
HYALINE CASTS #/AREA URNS LPF: ABNORMAL /LPF
KETONES UR STRIP-MCNC: NEGATIVE MG/DL
LEUKOCYTE ESTERASE UR QL STRIP: ABNORMAL
NITRITE UR QL STRIP: NEGATIVE
NON-SQ EPI CELLS URNS QL MICRO: ABNORMAL /HPF
PH UR STRIP.AUTO: 7.5 [PH]
PROT UR STRIP-MCNC: NEGATIVE MG/DL
RBC #/AREA URNS AUTO: ABNORMAL /HPF
SP GR UR STRIP.AUTO: 1 (ref 1–1.03)
UROBILINOGEN UR QL STRIP.AUTO: 1 E.U./DL
WBC #/AREA URNS AUTO: ABNORMAL /HPF

## 2019-10-04 PROCEDURE — NC001 PR NO CHARGE: Performed by: OBSTETRICS & GYNECOLOGY

## 2019-10-04 PROCEDURE — 81001 URINALYSIS AUTO W/SCOPE: CPT | Performed by: OBSTETRICS & GYNECOLOGY

## 2019-10-04 PROCEDURE — 87086 URINE CULTURE/COLONY COUNT: CPT | Performed by: OBSTETRICS & GYNECOLOGY

## 2019-10-04 PROCEDURE — 99213 OFFICE O/P EST LOW 20 MIN: CPT

## 2019-10-04 RX ORDER — METRONIDAZOLE 500 MG/1
500 TABLET ORAL EVERY 12 HOURS SCHEDULED
Qty: 14 TABLET | Refills: 0 | Status: SHIPPED | OUTPATIENT
Start: 2019-10-04 | End: 2019-10-04 | Stop reason: SDUPTHER

## 2019-10-04 RX ORDER — METRONIDAZOLE 500 MG/1
500 TABLET ORAL EVERY 12 HOURS SCHEDULED
Qty: 14 TABLET | Refills: 0 | Status: SHIPPED | OUTPATIENT
Start: 2019-10-04 | End: 2019-10-11

## 2019-10-04 NOTE — DISCHARGE INSTRUCTIONS
Pregnancy at 23 to 26 [address]:   You are now close to or at the beginning of the third trimester  The third trimester starts at 24 weeks and ends with delivery  As your baby gets larger, you may develop certain symptoms  These may include pain in your back or down the sides of your abdomen  You may also have stretch marks on your abdomen, breasts, thighs, or buttocks  You may also have constipation  DISCHARGE INSTRUCTIONS:   Seek care immediately if:   · You develop a severe headache that does not go away  · You have new or increased vision changes, such as blurred or spotted vision  · You have new or increased swelling in your face or hands  · You have vaginal spotting or bleeding  · Your water broke or you feel warm water gushing or trickling from your vagina  Contact your healthcare provider if:   · You have abdominal cramps, pressure, or tightening  · You have a change in vaginal discharge  · You have light bleeding  · You have chills or a fever  · You have vaginal itching, burning, or pain  · You have yellow, green, white, or foul-smelling vaginal discharge  · You have pain or burning when you urinate, less urine than usual, or pink or bloody urine  · You have questions or concerns about your condition or care  How to care for yourself at this stage of your pregnancy:   · Eat a variety of healthy foods  Healthy foods include fruits, vegetables, whole-grain breads, low-fat dairy foods, beans, lean meats, and fish  Drink liquids as directed  Ask how much liquid to drink each day and which liquids are best for you  Limit caffeine to less than 200 milligrams each day  Limit your intake of fish to 2 servings each week  Choose fish low in mercury such as canned light tuna, shrimp, salmon, cod, or tilapia  Do not  eat fish high in mercury such as swordfish, tilefish, jackie mackerel, and shark  · Manage back pain    Do not stand for long periods of time or lift heavy items  Use good posture while you stand, squat, or bend  Wear low-heeled shoes with good support  Rest may also help to relieve back pain  Ask your healthcare provider about exercises you can do to strengthen your back muscles  · Take prenatal vitamins as directed  Your need for certain vitamins and minerals, such as folic acid, increases during pregnancy  Prenatal vitamins provide some of the extra vitamins and minerals you need  Prenatal vitamins may also help to decrease the risk of certain birth defects  · Talk to your healthcare provider about exercise  Moderate exercise can help you stay fit  Your healthcare provider will help you plan an exercise program that is safe for you during pregnancy  · Do not smoke  If you smoke, it is never too late to quit  Smoking increases your risk of a miscarriage and other health problems during your pregnancy  Smoking can cause your baby to be born too early or weigh less at birth  Ask your healthcare provider for information if you need help quitting  · Do not drink alcohol  Alcohol passes from your body to your baby through the placenta  It can affect your baby's brain development and cause fetal alcohol syndrome (FAS)  FAS is a group of conditions that causes mental, behavior, and growth problems  · Talk to your healthcare provider before you take any medicines  Many medicines may harm your baby if you take them when you are pregnant  Do not take any medicines, vitamins, herbs, or supplements without first talking to your healthcare provider  Never use illegal or street drugs (such as marijuana or cocaine) while you are pregnant  Safety tips:   · Avoid hot tubs and saunas  Do not use a hot tub or sauna while you are pregnant, especially during your first trimester  Hot tubs and saunas may raise your baby's temperature and increase the risk of birth defects  · Avoid toxoplasmosis    This is an infection caused by eating raw meat or being around infected cat feces  It can cause birth defects, miscarriages, and other problems  Wash your hands after you touch raw meat  Make sure any meat is well-cooked before you eat it  Avoid raw eggs and unpasteurized milk  Use gloves or ask someone else to clean your cat's litter box while you are pregnant  Changes that are happening with your baby:  By 26 weeks, your baby will weigh about 2 pounds  Your baby will be about 10 inches long from the top of the head to the rump (baby's bottom)  Your baby's movements are much stronger now  Your baby's eyes are almost completely formed and can partially open  Your baby also sleeps and wakes up  What you need to know about prenatal care: Your healthcare provider will check your blood pressure and weight  You may also need the following:  · A urine test  may also be done to check for sugar and protein  These can be signs of gestational diabetes or infection  Protein in your urine may also be a sign of preeclampsia  Preeclampsia is a condition that can develop during week 20 or later of your pregnancy  It causes high blood pressure, and it can cause problems with your kidneys and other organs  · Fundal height  is a measurement of your uterus to check your baby's growth  This number is usually the same as the number of weeks that you have been pregnant  · Your baby's heart rate  will be checked  © 2017 [address] Information is for End User's use only and may not be sold, redistributed or otherwise used for commercial purposes  All illustrations and images included in CareNotes® are the copyrighted property of A D A M [employer]  or [employer] Daniel[employer]  The above information is an  only  It is not intended as medical advice for individual conditions or treatments  Talk to your doctor, nurse or pharmacist before following any medical regimen to see if it is safe and effective for you

## 2019-10-04 NOTE — PROGRESS NOTES
Triage Note - OB  Arcadio Cadet 20 y o  female MRN: [id number]  Unit/Bed#: LD Triage 2- Encounter: [id number]    OB TRIAGE NOTE  Arcadio Cadet  [id number]  10/5/2019  1:10 AM  LD Triage 2/LD Triage 2-*    ASSESS:  20 y o   23w0d with cramping and vaginal discharge -> bacterial vaginosis  PLAN  #1  Cramping:   · CL 3 2cm  · No contractions on toco  · UA negative for UTI  · KOH/wet mount consistent with BV    #2  Bacterial vaginosis:   · Meets 3/4 Amsel criteria - greyish discharge, +whiff, >20% clue cells on wet mount  · Flagyl 500mg BID for 7 days    #3  Discharge instructions  · Patient instructed to call if experiencing worsening contractions, vaginal bleeding, loss of fluid or decreased fetal movement  · Will call OB to schedule f/u appointment  D/w Dr Opal Olivas  ______________    SUBJECTIVE    MICHAEL: Estimated Date of Delivery: 20    HPI Chronology:  20 y o   23w0d presents with complaint of cramping that started last night and worsened throughout the day  She also had some irritation when she urinated and thought her urine looked foggy  She has also been having a yellow, malodorous discharge for about 1-2 days  The cramping pain is lower pelvis, bilaterally  Worse when she stands  Denies vaginal bleeding or LOF  +fetal movement  Has not had any recent intercourse  Vitals:   /65 (BP Location: Left arm)   Pulse 100   Temp 98 3 °F (36 8 °C) (Oral)   Resp 18   LMP 2019   SpO2 100%   There is no height or weight on file to calculate BMI  Physical Exam   Constitutional: She is oriented to person, place, and time  She appears well-developed and well-nourished  Cardiovascular: Normal rate  Pulmonary/Chest: Effort normal  No respiratory distress  Neurological: She is alert and oriented to person, place, and time  Skin: Skin is warm and dry  Psychiatric: She has a normal mood and affect   Her behavior is normal      Speculum exam: white/grey discharge throughout vagina, no bleeding noted; cervix appears visually closed  KOH/wet mount: +clue cells, no hyphae, no trich    TVUS: CL 32 cm    FHT:  Baseline Rate: 150 bpm  Variability: Minimal < 6 bpm     TOCO:   Contraction Frequency (minutes): none    Labs:   Recent Results (from the past 24 hour(s))   UA w Reflex to Microscopic w Reflex to Culture    Collection Time: 10/04/19  5:42 PM   Result Value Ref Range    Color, UA Yellow     Clarity, UA Cloudy     Specific [place], UA 1 003 1 003 - 1 030    pH, UA 7 5 4 5, 5 0, 5 5, 6 0, 6 5, 7 0, 7 5, 8 0    Leukocytes, UA Small (A) Negative    Nitrite, UA Negative Negative    Protein, UA Negative Negative mg/dl    Glucose, UA Negative Negative mg/dl    Ketones, UA Negative Negative mg/dl    Urobilinogen, UA 1 0 0 2, 1 0 E U /dl E U /dl    Bilirubin, UA Negative Negative    Blood, UA Negative Negative    URINE COMMENT     Urine Microscopic    Collection Time: 10/04/19  5:42 PM   Result Value Ref Range    RBC, UA None Seen None Seen, 0-5 /hpf    WBC, UA 4-10 (A) None Seen, 0-5, 5-55, 5-65 /hpf    Epithelial Cells Moderate (A) None Seen, Occasional /hpf    Bacteria, UA Occasional None Seen, Occasional /hpf    Hyaline Casts, UA None Seen None Seen /lpf    URINE COMMENT         Lab, Imaging and other studies: I have personally reviewed pertinent reports          Kathleen Skinner MD  OB/GYN PGY-2  10/5/2019  1:10 AM

## 2019-10-05 LAB — BACTERIA UR CULT: NORMAL

## 2019-10-07 ENCOUNTER — ROUTINE PRENATAL (OUTPATIENT)
Dept: OBGYN CLINIC | Facility: CLINIC | Age: 19
End: 2019-10-07

## 2019-10-07 VITALS
WEIGHT: 118 LBS | SYSTOLIC BLOOD PRESSURE: 94 MMHG | BODY MASS INDEX: 26.54 KG/M2 | DIASTOLIC BLOOD PRESSURE: 66 MMHG | HEART RATE: 114 BPM | HEIGHT: 56 IN | TEMPERATURE: 98.1 F

## 2019-10-07 DIAGNOSIS — Z34.92 PRENATAL CARE, SECOND TRIMESTER: Primary | ICD-10-CM

## 2019-10-07 DIAGNOSIS — Z3A.23 23 WEEKS GESTATION OF PREGNANCY: ICD-10-CM

## 2019-10-07 PROCEDURE — 99213 OFFICE O/P EST LOW 20 MIN: CPT | Performed by: OBSTETRICS & GYNECOLOGY

## 2019-10-07 NOTE — PROGRESS NOTES
Angie Hodges is a 20 y o   at 23w3d presenting for a routine prenatal appointment  She reports feeling sick and unwell since Thursday  Feels itching from chest to throat  Has been using Vicks vapor-rub which seems to help  Has been drinking a lot of water but has not tried any medications  Pt also reports dysuria occasionally    Endorses less fetal movement, but still feeling movement  Denies LOF, VB, contractions  Does reports small amount of cramping today, cramping started Friday but reports she was evaluated on L&D and nothing has changed since that time  Vitals:    10/07/19 1400   BP: 94/66   Pulse: (!) 114   Temp: 98 1 °F (36 7 °C)     Fundal height: 22  FHR: 155bpm    1  Pregnancy:  - Continue routine prenatal care  - Level II US performed , recommend return at [place] [id number] for growth scan  - Lab slips for 28w labs provided  - Return to clinic for routine prenatal appointment in 4 weeks    URI:  - Discussed that typically cold symptoms are caused by viruses and require time to pass  - Encouraged good hand hygiene  - Encouraged increased PO water intake, tea drinking and throat lozenges to soothe throat, and Tylenol for joint aches  Decreased fetal movement:  - Will obtain NST in office today    BV:  - Pt provided with script for Flagyl this weekend; having difficulty taking due to sore throat  - Pt reports ongoing symptoms since diagnosis on Friday but reports explicitly that they have not changed at all since diagnosis    - Offered re-evaluation in clinic; pt declines today    Dysuria:  - Clean catch today  - UDip WNL  - UCx pending    Juan Numbers, DO  OB/GYN, PGY4  10/7/2019, 2:15 PM

## 2019-10-09 ENCOUNTER — ROUTINE PRENATAL (OUTPATIENT)
Dept: OBGYN CLINIC | Facility: CLINIC | Age: 19
End: 2019-10-09

## 2019-10-09 VITALS
BODY MASS INDEX: 26.46 KG/M2 | DIASTOLIC BLOOD PRESSURE: 70 MMHG | HEART RATE: 111 BPM | SYSTOLIC BLOOD PRESSURE: 103 MMHG | WEIGHT: 118 LBS | TEMPERATURE: 98.6 F

## 2019-10-09 DIAGNOSIS — O99.342 ANXIETY IN PREGNANCY IN SECOND TRIMESTER, ANTEPARTUM: ICD-10-CM

## 2019-10-09 DIAGNOSIS — Z3A.23 23 WEEKS GESTATION OF PREGNANCY: Primary | ICD-10-CM

## 2019-10-09 DIAGNOSIS — F41.9 ANXIETY IN PREGNANCY IN SECOND TRIMESTER, ANTEPARTUM: ICD-10-CM

## 2019-10-09 PROCEDURE — 99213 OFFICE O/P EST LOW 20 MIN: CPT | Performed by: NURSE PRACTITIONER
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
## 2019-10-09 NOTE — PATIENT INSTRUCTIONS
Keep next scheduled appt  May f/u with PCP for throat culture-  [address] [facility] to establish a PCP       Pregnancy at 21 to 26 Weeks   AMBULATORY CARE:   What changes are happening to your body: You are now close to or at the beginning of the third trimester  The third trimester starts at 24 weeks and ends with delivery  As your baby gets larger, you may develop certain symptoms  These may include pain in your back or down the sides of your abdomen  You may also have stretch marks on your abdomen, breasts, thighs, or buttocks  You may also have constipation  Seek care immediately if:   · You develop a severe headache that does not go away  · You have new or increased vision changes, such as blurred or spotted vision  · You have new or increased swelling in your face or hands  · You have vaginal spotting or bleeding  · Your water broke or you feel warm water gushing or trickling from your vagina  Contact your healthcare provider if:   · You have abdominal cramps, pressure, or tightening  · You have a change in vaginal discharge  · You have light bleeding  · You have chills or a fever  · You have vaginal itching, burning, or pain  · You have yellow, green, white, or foul-smelling vaginal discharge  · You have pain or burning when you urinate, less urine than usual, or pink or bloody urine  · You have questions or concerns about your condition or care  How to care for yourself at this stage of your pregnancy:   · Eat a variety of healthy foods  Healthy foods include fruits, vegetables, whole-grain breads, low-fat dairy foods, beans, lean meats, and fish  Drink liquids as directed  Ask how much liquid to drink each day and which liquids are best for you  Limit caffeine to less than 200 milligrams each day  Limit your intake of fish to 2 servings each week  Choose fish low in mercury such as canned light tuna, shrimp, salmon, cod, or tilapia   Do not  eat fish high in mercury such as swordfish, tilefish, jackie mackerel, and shark  · Manage back pain  Do not stand for long periods of time or lift heavy items  Use good posture while you stand, squat, or bend  Wear low-heeled shoes with good support  Rest may also help to relieve back pain  Ask your healthcare provider about exercises you can do to strengthen your back muscles  · Take prenatal vitamins as directed  Your need for certain vitamins and minerals, such as folic acid, increases during pregnancy  Prenatal vitamins provide some of the extra vitamins and minerals you need  Prenatal vitamins may also help to decrease the risk of certain birth defects  · Talk to your healthcare provider about exercise  Moderate exercise can help you stay fit  Your healthcare provider will help you plan an exercise program that is safe for you during pregnancy  · Do not smoke  If you smoke, it is never too late to quit  Smoking increases your risk of a miscarriage and other health problems during your pregnancy  Smoking can cause your baby to be born too early or weigh less at birth  Ask your healthcare provider for information if you need help quitting  · Do not drink alcohol  Alcohol passes from your body to your baby through the placenta  It can affect your baby's brain development and cause fetal alcohol syndrome (FAS)  FAS is a group of conditions that causes mental, behavior, and growth problems  · Talk to your healthcare provider before you take any medicines  Many medicines may harm your baby if you take them when you are pregnant  Do not take any medicines, vitamins, herbs, or supplements without first talking to your healthcare provider  Never use illegal or street drugs (such as marijuana or cocaine) while you are pregnant  Safety tips during pregnancy:   · Avoid hot tubs and saunas  Do not use a hot tub or sauna while you are pregnant, especially during your first trimester   Hot tubs and saunas may raise your baby's temperature and increase the risk of birth defects  · Avoid toxoplasmosis  This is an infection caused by eating raw meat or being around infected cat feces  It can cause birth defects, miscarriages, and other problems  Wash your hands after you touch raw meat  Make sure any meat is well-cooked before you eat it  Avoid raw eggs and unpasteurized milk  Use gloves or ask someone else to clean your cat's litter box while you are pregnant  Changes that are happening with your baby:  By 26 weeks, your baby will weigh about 2 pounds  Your baby will be about 10 inches long from the top of the head to the rump (baby's bottom)  Your baby's movements are much stronger now  Your baby's eyes are almost completely formed and can partially open  Your baby also sleeps and wakes up  What you need to know about prenatal care: Your healthcare provider will check your blood pressure and weight  You may also need the following:  · A urine test  may also be done to check for sugar and protein  These can be signs of gestational diabetes or infection  Protein in your urine may also be a sign of preeclampsia  Preeclampsia is a condition that can develop during week 20 or later of your pregnancy  It causes high blood pressure, and it can cause problems with your kidneys and other organs  · Fundal height  is a measurement of your uterus to check your baby's growth  This number is usually the same as the number of weeks that you have been pregnant  · Your baby's heart rate  will be checked  © 2017 [address] Information is for End User's use only and may not be sold, redistributed or otherwise used for commercial purposes  All illustrations and images included in CareNotes® are the copyrighted property of [employer]  or [employer] Daniel[employer]  The above information is an  only  It is not intended as medical advice for individual conditions or treatments   Talk to your doctor, nurse or pharmacist before following any medical regimen to see if it is safe and effective for you

## 2019-10-21 ENCOUNTER — HOSPITAL ENCOUNTER (OUTPATIENT)
Facility: HOSPITAL | Age: 19
Discharge: HOME | End: 2019-10-22
Attending: OBSTETRICS & GYNECOLOGY | Admitting: OBSTETRICS & GYNECOLOGY
Payer: COMMERCIAL

## 2019-10-21 ENCOUNTER — APPOINTMENT (OUTPATIENT)
Dept: LAB | Facility: HOSPITAL | Age: 19
End: 2019-10-21
Payer: COMMERCIAL

## 2019-10-21 VITALS
DIASTOLIC BLOOD PRESSURE: 65 MMHG | HEIGHT: 56 IN | TEMPERATURE: 98.1 F | HEART RATE: 101 BPM | OXYGEN SATURATION: 98 % | WEIGHT: 119 LBS | BODY MASS INDEX: 26.77 KG/M2 | RESPIRATION RATE: 16 BRPM | SYSTOLIC BLOOD PRESSURE: 102 MMHG

## 2019-10-21 DIAGNOSIS — Z3A.23 23 WEEKS GESTATION OF PREGNANCY: ICD-10-CM

## 2019-10-21 LAB
BASOPHILS # BLD AUTO: 0.03 THOUSANDS/ΜL (ref 0–0.1)
BASOPHILS NFR BLD AUTO: 0 % (ref 0–1)
EOSINOPHIL # BLD AUTO: 0.03 THOUSAND/ΜL (ref 0–0.61)
EOSINOPHIL NFR BLD AUTO: 0 % (ref 0–6)
ERYTHROCYTE [DISTWIDTH] IN BLOOD BY AUTOMATED COUNT: 13.2 % (ref 11.6–15.1)
GLUCOSE 1H P 50 G GLC PO SERPL-MCNC: 98 MG/DL
HCT VFR BLD AUTO: 31.7 % (ref 34.8–46.1)
HGB BLD-MCNC: 10.4 G/DL (ref 11.5–15.4)
IMM GRANULOCYTES # BLD AUTO: 0.08 THOUSAND/UL (ref 0–0.2)
IMM GRANULOCYTES NFR BLD AUTO: 1 % (ref 0–2)
LYMPHOCYTES # BLD AUTO: 1.35 THOUSANDS/ΜL (ref 0.6–4.47)
LYMPHOCYTES NFR BLD AUTO: 17 % (ref 14–44)
MCH RBC QN AUTO: 29 PG (ref 26.8–34.3)
MCHC RBC AUTO-ENTMCNC: 32.8 G/DL (ref 31.4–37.4)
MCV RBC AUTO: 88 FL (ref 82–98)
MONOCYTES # BLD AUTO: 0.52 THOUSAND/ΜL (ref 0.17–1.22)
MONOCYTES NFR BLD AUTO: 7 % (ref 4–12)
NEUTROPHILS # BLD AUTO: 5.93 THOUSANDS/ΜL (ref 1.85–7.62)
NEUTS SEG NFR BLD AUTO: 75 % (ref 43–75)
NRBC BLD AUTO-RTO: 0 /100 WBCS
PLATELET # BLD AUTO: 278 THOUSANDS/UL (ref 149–390)
PMV BLD AUTO: 9.6 FL (ref 8.9–12.7)
RBC # BLD AUTO: 3.59 MILLION/UL (ref 3.81–5.12)
WBC # BLD AUTO: 7.94 THOUSAND/UL (ref 4.31–10.16)

## 2019-10-21 PROCEDURE — 82950 GLUCOSE TEST: CPT

## 2019-10-21 PROCEDURE — 85025 COMPLETE CBC W/AUTO DIFF WBC: CPT

## 2019-10-21 PROCEDURE — 86592 SYPHILIS TEST NON-TREP QUAL: CPT

## 2019-10-21 PROCEDURE — 36415 COLL VENOUS BLD VENIPUNCTURE: CPT

## 2019-10-22 PROBLEM — Z3A.25 25 WEEKS GESTATION OF PREGNANCY: Status: ACTIVE | Noted: 2019-06-25
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
## 2019-10-22 LAB — RPR SER QL: NORMAL

## 2019-10-22 PROCEDURE — 99213 OFFICE O/P EST LOW 20 MIN: CPT

## 2019-10-22 PROCEDURE — 99219 PR INITIAL OBSERVATION CARE/DAY 50 MINUTES: CPT | Performed by: OBSTETRICS & GYNECOLOGY

## 2019-10-22 PROCEDURE — 76815 OB US LIMITED FETUS(S): CPT | Performed by: OBSTETRICS & GYNECOLOGY

## 2019-10-22 NOTE — PROGRESS NOTES
Triage Note - OB  Dianelys Carnes 20 y o  female MRN: [id number]  Unit/Bed#:  Triage 4-01 Encounter: [id number]    OB TRIAGE NOTE  Dianelys Carnes  [id number]  10/22/2019  3:21 AM  LD Triage 4/LD Triage 4-*    ASSESS:  20 y o  Kana Howard 25w4d presents after falling at home  PLAN  #1  Fall:   · Cat I FHT over 4 hours  · No bleeding or cramping  · No placental or other concerns on transabdominal ultrasound       Discharge instructions  · Patient instructed to call if experiencing worsening contractions, vaginal bleeding, loss of fluid or decreased fetal movement  · Will follow up with OBGYN  D/w Dr Capps Dec  ______________    SUBJECTIVE    MICHAEL: Estimated Date of Delivery: 1/31/20    HPI Chronology:  20 y o  Kana Howard 25w4d presents with complaint of falling at home earlier today  She notes that she hit her left lower abdomen and bumped her head during the fall  She did not lose consciousness and denies any headaches or visual changes since  She denies any bleeding or cramping  She feels generally well otherwise and has no other complaints  Contractions: no  Leakage: no  Bleeding: no  Fetal Movement: yes  Pelvic pain: no    Vitals:   /65 (BP Location: Right arm)   Pulse 101   Temp 98 1 °F (36 7 °C) (Oral)   Resp 16   Ht 4' 8" (1 422 m)   Wt 54 kg (119 lb)   LMP 04/26/2019   SpO2 98%   BMI 26 68 kg/m²   Body mass index is 26 68 kg/m²  Physical Exam   Constitutional: She is oriented to person, place, and time  She appears well-developed and well-nourished  HENT:   Head: Normocephalic and atraumatic  Eyes: Pupils are equal, round, and reactive to light  Conjunctivae and EOM are normal    Neck: Normal range of motion  Neck supple  Cardiovascular: Normal rate, regular rhythm and normal heart sounds  Pulmonary/Chest: Effort normal and breath sounds normal  No respiratory distress  Abdominal: There is no tenderness  There is no rebound and no guarding     Musculoskeletal: Normal range of motion  She exhibits no edema  Neurological: She is alert and oriented to person, place, and time  Skin: Skin is warm and dry  Psychiatric: She has a normal mood and affect  Her behavior is normal  Thought content normal        FHT:  Baseline Rate: 135 bpm  Variability: Moderate 6-25 bpm  Accelerations: 10 x 10 (<32 weeks), Periodic  Decelerations: None  FHR Category: Category I  TOCO:   Contraction Frequency (minutes): irritability    Labs:   Recent Results (from the past 24 hour(s))   CBC and differential    Collection Time: 10/21/19 11:53 AM   Result Value Ref Range    WBC 7 94 4 31 - 10 16 Thousand/uL    RBC 3 59 (L) 3 81 - 5 12 Million/uL    Hemoglobin 10 4 (L) 11 5 - 15 4 g/dL    Hematocrit 31 7 (L) 34 8 - 46 1 %    MCV 88 82 - 98 fL    MCH 29 0 26 8 - 34 3 pg    MCHC 32 8 31 4 - 37 4 g/dL    RDW 13 2 11 6 - 15 1 %    MPV 9 6 8 9 - 12 7 fL    Platelets 955 539 - 563 Thousands/uL    nRBC 0 /100 WBCs    Neutrophils Relative 75 43 - 75 %    Immat GRANS % 1 0 - 2 %    Lymphocytes Relative 17 14 - 44 %    Monocytes Relative 7 4 - 12 %    Eosinophils Relative 0 0 - 6 %    Basophils Relative 0 0 - 1 %    Neutrophils Absolute 5 93 1 85 - 7 62 Thousands/µL    Immature Grans Absolute 0 08 0 00 - 0 20 Thousand/uL    Lymphocytes Absolute 1 35 0 60 - 4 47 Thousands/µL    Monocytes Absolute 0 52 0 17 - 1 22 Thousand/µL    Eosinophils Absolute 0 03 0 00 - 0 61 Thousand/µL    Basophils Absolute 0 03 0 00 - 0 10 Thousands/µL   Glucose, 1H PG    Collection Time: 10/21/19 11:53 AM   Result Value Ref Range    Glucose 98 <=134 mg/dL       Lab, Imaging and other studies: I have personally reviewed pertinent reports          Pastor Thais MD  OB/GYN PGY-1  10/22/2019  3:21 AM

## 2019-10-22 NOTE — PROGRESS NOTES
Patient presented with complaint of fall as she was getting into the tub  She fell and hit her left hip and back of her head  She denies leaking of fluid or bleeding or abdominal pain loss of consciousness  She denies headache or visual changes  She notes good movement  /65 (BP Location: Right arm)   Pulse 101   Temp 98 1 °F (36 7 °C) (Oral)   Resp 16   Ht 4' 8" (1 422 m)   Wt 54 kg (119 lb)   LMP 04/26/2019   SpO2 98%   BMI 26 68 kg/m²    Abdomen soft mild tenderness periumbilically with pressure  No bruising  There is no regidity or guarding  US shows the fetus is vertex and [id number] 14 oz  AMELIA 15  Anterior placenta and no sign of an abruption  Nml umbilical dopplers  Nml mca Peak systolic dopplers  No sign of fetal anemia seen  NST shows rare contractions  140-150 reactive gbtbv  Rh + blood type  Will monitor x 4 hrs and if the fetal testing is reassuring and she does not develop regular contractions that are 4 /hour or more then she can be discharged home  She has a ob visit on 11/4 and a mfm scan on 12/6      Ann-Marie Martinez MD

## 2019-10-22 NOTE — PROCEDURES
Clara Patel, a  at 25w4d with an MICHAEL of 2020, by Last Menstrual Period, was seen at [address] [facility] for the following procedure(s): $Procedure Type: US - abdominal]                   Ultrasound Other  Fetal Presentation: Vertex  Placenta Location: Anterior  Placenta Grade:  I  Placenta Previa: No  Vasa Previa: No    1lb 14 oz nml umb dopplers and mca show no anemia AMELIA 15    Petra Jurado MD

## 2019-11-04 ENCOUNTER — ROUTINE PRENATAL (OUTPATIENT)
Dept: OBGYN CLINIC | Facility: CLINIC | Age: 19
End: 2019-11-04

## 2019-11-04 VITALS
HEIGHT: 56 IN | SYSTOLIC BLOOD PRESSURE: 111 MMHG | DIASTOLIC BLOOD PRESSURE: 73 MMHG | BODY MASS INDEX: 27.22 KG/M2 | HEART RATE: 111 BPM | WEIGHT: 121 LBS

## 2019-11-04 DIAGNOSIS — Z3A.27 27 WEEKS GESTATION OF PREGNANCY: Primary | ICD-10-CM

## 2019-11-04 LAB
SL AMB  POCT GLUCOSE, UA: NORMAL
SL AMB POCT KETONES,UA: NORMAL
SL AMB POCT URINE PROTEIN: NORMAL

## 2019-11-04 PROCEDURE — 99213 OFFICE O/P EST LOW 20 MIN: CPT | Performed by: OBSTETRICS & GYNECOLOGY

## 2019-11-04 PROCEDURE — 81002 URINALYSIS NONAUTO W/O SCOPE: CPT | Performed by: OBSTETRICS & GYNECOLOGY

## 2019-11-04 RX ORDER — DIPHENHYDRAMINE HCL 50 MG
50 CAPSULE ORAL
Qty: 30 CAPSULE | Refills: 0 | Status: ON HOLD | OUTPATIENT
Start: 2019-11-04 | End: 2019-12-20
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
## 2019-11-11 ENCOUNTER — PATIENT OUTREACH (OUTPATIENT)
Dept: OBGYN CLINIC | Facility: CLINIC | Age: 19
End: 2019-11-11
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
## 2019-11-18 ENCOUNTER — HOSPITAL ENCOUNTER (OUTPATIENT)
Facility: HOSPITAL | Age: 19
Discharge: HOME | End: 2019-11-18
Attending: OBSTETRICS & GYNECOLOGY | Admitting: OBSTETRICS & GYNECOLOGY
Payer: COMMERCIAL

## 2019-11-18 VITALS
DIASTOLIC BLOOD PRESSURE: 58 MMHG | HEART RATE: 107 BPM | SYSTOLIC BLOOD PRESSURE: 107 MMHG | RESPIRATION RATE: 18 BRPM | TEMPERATURE: 98.5 F

## 2019-11-18 DIAGNOSIS — B96.89 BACTERIAL VAGINOSIS: Primary | ICD-10-CM

## 2019-11-18 DIAGNOSIS — N76.0 BACTERIAL VAGINOSIS: Primary | ICD-10-CM

## 2019-11-18 PROBLEM — Z3A.29 29 WEEKS GESTATION OF PREGNANCY: Status: ACTIVE | Noted: 2019-06-25

## 2019-11-18 PROCEDURE — NC001 PR NO CHARGE: Performed by: OBSTETRICS & GYNECOLOGY

## 2019-11-18 PROCEDURE — 99214 OFFICE O/P EST MOD 30 MIN: CPT

## 2019-11-18 PROCEDURE — 76817 TRANSVAGINAL US OBSTETRIC: CPT | Performed by: OBSTETRICS & GYNECOLOGY

## 2019-11-18 PROCEDURE — 76815 OB US LIMITED FETUS(S): CPT | Performed by: OBSTETRICS & GYNECOLOGY

## 2019-11-18 RX ORDER — METRONIDAZOLE 500 MG/1
500 TABLET ORAL EVERY 8 HOURS SCHEDULED
Qty: 14 TABLET | Refills: 0 | Status: SHIPPED | OUTPATIENT
Start: 2019-11-18 | End: 2019-11-22

## 2019-11-18 NOTE — DISCHARGE INSTRUCTIONS
Pregnancy at 27 to 30 [address]:   You may notice new symptoms such as shortness of breath, heartburn, or swelling of your ankles and feet  You may also have trouble sleeping or contractions  DISCHARGE INSTRUCTIONS:   Return to the emergency department if:   · You develop a severe headache that does not go away  · You have new or increased vision changes, such as blurred or spotted vision  · You have new or increased swelling in your face or hands  · You have vaginal spotting or bleeding  · Your water broke or you feel warm water gushing or trickling from your vagina  Contact your healthcare provider if:   · You have more than 5 contractions in 1 hour  · You notice any changes in your baby's movements  · You have abdominal cramps, pressure, or tightening  · You have a change in vaginal discharge  · You have chills or a fever  · You have vaginal itching, burning, or pain  · You have yellow, green, white, or foul-smelling vaginal discharge  · You have pain or burning when you urinate, less urine than usual, or pink or bloody urine  · You have questions or concerns about your condition or care  How to care for yourself at this stage of your pregnancy:   · Eat a variety of healthy foods  Healthy foods include fruits, vegetables, whole-grain breads, low-fat dairy foods, beans, lean meats, and fish  Drink liquids as directed  Ask how much liquid to drink each day and which liquids are best for you  Limit caffeine to less than 200 milligrams each day  Limit your intake of fish to 2 servings each week  Choose fish low in mercury such as canned light tuna, shrimp, salmon, cod, or tilapia  Do not  eat fish high in mercury such as swordfish, tilefish, jackie mackerel, and shark  · Manage heartburn  by eating 4 or 5 small meals each day instead of large meals  Avoid spicy food  · Manage swelling  by lying down and putting your feet up       · Take prenatal vitamins as directed  Your need for certain vitamins and minerals, such as folic acid, increases during pregnancy  Prenatal vitamins provide some of the extra vitamins and minerals you need  Prenatal vitamins may also help to decrease the risk of certain birth defects  · Talk to your healthcare provider about exercise  Moderate exercise can help you stay fit  Your healthcare provider will help you plan an exercise program that is safe for you during pregnancy  · Do not smoke  If you smoke, it is never too late to quit  Smoking increases your risk of a miscarriage and other health problems during your pregnancy  Smoking can cause your baby to be born too early or weigh less at birth  Ask your healthcare provider for information if you need help quitting  · Do not drink alcohol  Alcohol passes from your body to your baby through the placenta  It can affect your baby's brain development and cause fetal alcohol syndrome (FAS)  FAS is a group of conditions that causes mental, behavior, and growth problems  · Talk to your healthcare provider before you take any medicines  Many medicines may harm your baby if you take them when you are pregnant  Do not take any medicines, vitamins, herbs, or supplements without first talking to your healthcare provider  Never use illegal or street drugs (such as marijuana or cocaine) while you are pregnant  Safety tips during pregnancy:   · Avoid hot tubs and saunas  Do not use a hot tub or sauna while you are pregnant, especially during your first trimester  Hot tubs and saunas may raise your baby's temperature and increase the risk of birth defects  · Avoid toxoplasmosis  This is an infection caused by eating raw meat or being around infected cat feces  It can cause birth defects, miscarriages, and other problems  Wash your hands after you touch raw meat  Make sure any meat is well-cooked before you eat it  Avoid raw eggs and unpasteurized milk   Use gloves or ask someone else to clean your cat's litter box while you are pregnant  Changes that are happening with your baby:  By 30 weeks, your baby may weigh more than 3 pounds  Your baby may be about 11 inches long from the top of the head to the rump (baby's bottom)  Your baby's eyes open and close now  Your baby's kicks and movements are more forceful at this time  What you need to know about prenatal care: Your healthcare provider will check your blood pressure and weight  You may also need the following:  · Blood tests  may be done to check for anemia or blood type  · A urine test  may also be done to check for sugar and protein  These can be signs of gestational diabetes or infection  Protein in your urine may also be a sign of preeclampsia  Preeclampsia is a condition that can develop during week 20 or later of your pregnancy  It causes high blood pressure, and it can cause problems with your kidneys and other organs  · A Tdap vaccine and flu vaccine  may be recommended by your healthcare provider  · A gestational diabetes screen  will be done using an oral glucose tolerance test (OGTT)  An OGTT starts with a blood sugar level check after you have not eaten for 8 hours  You are then given a glucose drink  Your blood sugar level is checked after 1 hour, 2 hours, and sometimes 3 hours  Healthcare providers look at how much your blood sugar level increases from the first check  · Fundal height  is a measurement of your uterus to check your baby's growth  This number is usually the same as the number of weeks that you have been pregnant  Your healthcare provider may also check your baby's position  · Your baby's heart rate  will be checked  © 2017 [address] [facility] Information is for End User's use only and may not be sold, redistributed or otherwise used for commercial purposes   All illustrations and images included in CareNotes® are the copyrighted property of A D A M , Inc  or [employer]  The above information is an  only  It is not intended as medical advice for individual conditions or treatments  Talk to your doctor, nurse or pharmacist before following any medical regimen to see if it is safe and effective for you

## 2019-11-18 NOTE — PROCEDURES
Clara Patel, a  at 29w3d with an MICHAEL of 2020, by Last Menstrual Period, was seen at [address] [facility] for the following procedure(s): $Procedure Type: AMELIA, US - Transvaginal]         4 Quadrant AMELIA  AMELIA Q1 (cm): 3 7 cm  AMELIA Q2 (cm): 1 6 cm  AMELIA Q3 (cm): 5 6 cm  AMELIA Q4 (cm): 4 1 cm  AMELIA TOTAL (cm): 15 cm         Ultrasound Other  Fetal Presentation: Vertex  Cervical Length: 2 7  Funnel: No  Debris: No  Placenta Previa: No  Vasa Previa: No         Ultrasound Probe Disinfection    A transvaginal ultrasound was performed     Prior to use, disinfection was performed with High Level Disinfection Process (Trophon)  Probe serial number SLB-LD: [id number] was used    Harsh Abrams MD  19  4:22 PM

## 2019-11-18 NOTE — PROGRESS NOTES
L&D Triage Note - OB/GYN  Eliud Headings 20 y o  female MRN: [id number]  Unit/Bed#: LD Triage 4-01 Encounter: [id number]      Assessment:  20 y o  Axel Close at 29w3d not ruptured with BV  Plan:  1   Stable for discharge home with return precautions      ______________________________________________________________________      Chief Compliant: ***    Subjective:  20 y o  Axel Close at 29w3d ***      Objective:  Vitals:    11/18/19 1500   BP: 107/58   Pulse: (!) 107   Resp: 18   Temp: 98 5 °F (36 9 °C)       SVE: {gen number 8-41:473160} / {Effacement :16714} / {[facility]:26929}  SSE: ***   FHT:  *** / {FHR Variability:85913} / ***, ***  Jal: q***    Wet mount/KOH: ***  Rupture workup: Nitrazine ***, *** Ferning, *** Pooling    TAUS  AMELIA: 15cm  Vertex  TVUS:    Cervical length: ***   Vertex ***   *** funneling, *** dynamic changes      Discussed with Dr Humberto Dodd and Sea Roque MD  11/18/2019 4:07 PM
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
## 2019-11-22 ENCOUNTER — HOSPITAL ENCOUNTER (OUTPATIENT)
Facility: HOSPITAL | Age: 19
Discharge: HOME | End: 2019-11-23
Attending: OBSTETRICS & GYNECOLOGY | Admitting: OBSTETRICS & GYNECOLOGY
Payer: COMMERCIAL

## 2019-11-22 ENCOUNTER — PATIENT OUTREACH (OUTPATIENT)
Dept: OBGYN CLINIC | Facility: CLINIC | Age: 19
End: 2019-11-22

## 2019-11-22 ENCOUNTER — ROUTINE PRENATAL (OUTPATIENT)
Dept: OBGYN CLINIC | Facility: CLINIC | Age: 19
End: 2019-11-22

## 2019-11-22 VITALS
TEMPERATURE: 97.7 F | RESPIRATION RATE: 20 BRPM | SYSTOLIC BLOOD PRESSURE: 96 MMHG | OXYGEN SATURATION: 97 % | HEART RATE: 80 BPM | DIASTOLIC BLOOD PRESSURE: 59 MMHG

## 2019-11-22 VITALS
HEART RATE: 117 BPM | DIASTOLIC BLOOD PRESSURE: 56 MMHG | WEIGHT: 127 LBS | SYSTOLIC BLOOD PRESSURE: 96 MMHG | BODY MASS INDEX: 28.57 KG/M2 | HEIGHT: 56 IN

## 2019-11-22 DIAGNOSIS — F41.9 ANXIETY: ICD-10-CM

## 2019-11-22 DIAGNOSIS — Z3A.30 30 WEEKS GESTATION OF PREGNANCY: Primary | ICD-10-CM

## 2019-11-22 DIAGNOSIS — O99.342 ANXIETY IN PREGNANCY IN SECOND TRIMESTER, ANTEPARTUM: Primary | ICD-10-CM

## 2019-11-22 DIAGNOSIS — Z3A.30 30 WEEKS GESTATION OF PREGNANCY: ICD-10-CM

## 2019-11-22 DIAGNOSIS — F41.9 ANXIETY IN PREGNANCY IN SECOND TRIMESTER, ANTEPARTUM: Primary | ICD-10-CM

## 2019-11-22 DIAGNOSIS — O99.013 ANEMIA DURING PREGNANCY IN THIRD TRIMESTER: ICD-10-CM

## 2019-11-22 PROCEDURE — 99213 OFFICE O/P EST LOW 20 MIN: CPT | Performed by: OBSTETRICS & GYNECOLOGY

## 2019-11-22 PROCEDURE — 93005 ELECTROCARDIOGRAM TRACING: CPT

## 2019-11-22 PROCEDURE — 99214 OFFICE O/P EST MOD 30 MIN: CPT | Performed by: NURSE PRACTITIONER

## 2019-11-22 RX ORDER — FAMOTIDINE 20 MG/1
20 TABLET, FILM COATED ORAL ONCE
Status: DISCONTINUED | OUTPATIENT
Start: 2019-11-22 | End: 2019-11-23 | Stop reason: HOSPADM

## 2019-11-22 RX ORDER — FERROUS SULFATE TAB EC 324 MG (65 MG FE EQUIVALENT) 324 (65 FE) MG
324 TABLET DELAYED RESPONSE ORAL EVERY OTHER DAY
Qty: 60 TABLET | Refills: 0 | Status: ON HOLD | OUTPATIENT
Start: 2019-11-22 | End: 2019-12-21 | Stop reason: SDUPTHER

## 2019-11-22 NOTE — PROGRESS NOTES
EARNESTINE met with Pt for follow up  Pt reported she is doing well  Happy is getting her GED test on December 20  Pt state she is moving this week to [place]  Pt denies concern at this time, Pt states she is considering to relocate with her mom after delivery  SW encouraged Pt to keep up the good work  Pt will contact SW as needed
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
## 2019-11-22 NOTE — PATIENT INSTRUCTIONS
Kick Counts in Pregnancy   AMBULATORY CARE:   Kick counts  measure how much your baby is moving in your womb  A kick from your baby can be felt as a twist, turn, swish, roll, or jab  It is common to feel your baby kicking at 26 to 28 weeks of pregnancy  You may feel your baby kick as early as 20 weeks of pregnancy  Seek care immediately if:   · You feel your baby kick less as the day goes on      · You do not feel any kicks in a day  Contact your healthcare provider if:   · You feel a change in the number of kicks or movements of your baby  · You feel fewer than 10 kicks within 2 hours after counting twice  · You have questions or concerns about your baby's movements  Why measure kick counts:  Your baby's movement may provide information about your baby's health  He may move less, or not at all, if there are problems  He may move less if he does not have enough room to grow in your uterus (womb)  He may also move less if he is not getting enough oxygen or nutrition from the placenta  Tell your healthcare provider as soon as you feel a change in your baby's movements  Problems that are found earlier are easier to treat  When to measure kick counts:   · Measure kick counts at the same time every day  · Measure kick counts when your baby is awake and most active  Your baby may be most active in the evening  · Measure kick counts after a meal or snack  Your baby may be more active after you eat  Wait 2 hours after you drink liquids that contain caffeine  Caffeine can make your baby more active than usual     · You should not smoke while you are pregnant  Smoking increases the risk of health problems for you and for your baby during your pregnancy  If you do smoke, wait 2 hours to measure kick counts  Nicotine can make your baby more active than usual   How to measure kick counts:  Check that your baby is awake before you measure kick counts   You can wake up your baby by lightly pushing on your belly, walking, or drinking something cold  Your healthcare provider may tell you different ways to measure kick counts  He may tell you to do the following:  · Use a chart or clock to keep track of the time you start and finish counting  · Sit in a chair or lie on your left side  · Place your hands on the largest part of your belly  · Count until you reach 10 kicks  Write down how much time it takes to count 10 kicks  · It may take 30 minutes to 2 hours to count 10 kicks  It should not take more than 2 hours to count 10 kicks  · If you do not feel 10 kicks within 2 hours, wait 1 hour and count again  Your baby can sleep for up to 40 minutes at one time  Follow up with your healthcare provider as directed:  Write down your questions so you remember to ask them during your visits  © 2017 [address] [employer] Information is for End User's use only and may not be sold, redistributed or otherwise used for commercial purposes  All illustrations and images included in CareNotes® are the copyrighted property of [employer]  or [employer]  The above information is an  only  It is not intended as medical advice for individual conditions or treatments  Talk to your doctor, nurse or pharmacist before following any medical regimen to see if it is safe and effective for you  Pregnancy at 32 to 30 [address]:   What changes are happening in my body? You may notice new symptoms such as shortness of breath, heartburn, or swelling of your ankles and feet  You may also have trouble sleeping or contractions  How do I care for myself at this stage of my pregnancy? · Eat a variety of healthy foods  Healthy foods include fruits, vegetables, whole-grain breads, low-fat dairy foods, beans, lean meats, and fish  Drink liquids as directed  Ask how much liquid to drink each day and which liquids are best for you   Limit caffeine to less than 200 milligrams each day  Limit your intake of fish to 2 servings each week  Choose fish low in mercury such as canned light tuna, shrimp, salmon, cod, or tilapia  Do not  eat fish high in mercury such as swordfish, tilefish, jackie mackerel, and shark  · Manage heartburn  by eating 4 or 5 small meals each day instead of large meals  Avoid spicy food  · Manage swelling  by lying down and putting your feet up  · Take prenatal vitamins as directed  Your need for certain vitamins and minerals, such as folic acid, increases during pregnancy  Prenatal vitamins provide some of the extra vitamins and minerals you need  Prenatal vitamins may also help to decrease the risk of certain birth defects  · Talk to your healthcare provider about exercise  Moderate exercise can help you stay fit  Your healthcare provider will help you plan an exercise program that is safe for you during pregnancy  · Do not smoke  If you smoke, it is never too late to quit  Smoking increases your risk of a miscarriage and other health problems during your pregnancy  Smoking can cause your baby to be born too early or weigh less at birth  Ask your healthcare provider for information if you need help quitting  · Do not drink alcohol  Alcohol passes from your body to your baby through the placenta  It can affect your baby's brain development and cause fetal alcohol syndrome (FAS)  FAS is a group of conditions that causes mental, behavior, and growth problems  · Talk to your healthcare provider before you take any medicines  Many medicines may harm your baby if you take them when you are pregnant  Do not take any medicines, vitamins, herbs, or supplements without first talking to your healthcare provider  Never use illegal or street drugs (such as marijuana or cocaine) while you are pregnant  What are some safety tips during pregnancy? · Avoid hot tubs and saunas    Do not use a hot tub or sauna while you are pregnant, especially during your first trimester  Hot tubs and saunas may raise your baby's temperature and increase the risk of birth defects  · Avoid toxoplasmosis  This is an infection caused by eating raw meat or being around infected cat feces  It can cause birth defects, miscarriages, and other problems  Wash your hands after you touch raw meat  Make sure any meat is well-cooked before you eat it  Avoid raw eggs and unpasteurized milk  Use gloves or ask someone else to clean your cat's litter box while you are pregnant  What changes are happening with my baby? By 30 weeks, your baby may weigh more than 3 pounds  Your baby may be about 11 inches long from the top of the head to the rump (baby's bottom)  Your baby's eyes open and close now  Your baby's kicks and movements are more forceful at this time  What do I need to know about prenatal care? Your healthcare provider will check your blood pressure and weight  You may also need the following:  · Blood tests  may be done to check for anemia or blood type  · A urine test  may also be done to check for sugar and protein  These can be signs of gestational diabetes or infection  Protein in your urine may also be a sign of preeclampsia  Preeclampsia is a condition that can develop during week 20 or later of your pregnancy  It causes high blood pressure, and it can cause problems with your kidneys and other organs  · A Tdap vaccine and flu vaccine  may be recommended by your healthcare provider  · A gestational diabetes screen  will be done using an oral glucose tolerance test (OGTT)  An OGTT starts with a blood sugar level check after you have not eaten for 8 hours  You are then given a glucose drink  Your blood sugar level is checked after 1 hour, 2 hours, and sometimes 3 hours  Healthcare providers look at how much your blood sugar level increases from the first check  · Fundal height  is a measurement of your uterus to check your baby's growth   This number is usually the same as the number of weeks that you have been pregnant  Your healthcare provider may also check your baby's position  · Your baby's heart rate  will be checked  When should I seek immediate care? · You develop a severe headache that does not go away  · You have new or increased vision changes, such as blurred or spotted vision  · You have new or increased swelling in your face or hands  · You have vaginal spotting or bleeding  · Your water broke or you feel warm water gushing or trickling from your vagina  When should I contact my healthcare provider? · You have more than 5 contractions in 1 hour  · You notice any changes in your baby's movements  · You have abdominal cramps, pressure, or tightening  · You have a change in vaginal discharge  · You have chills or a fever  · You have vaginal itching, burning, or pain  · You have yellow, green, white, or foul-smelling vaginal discharge  · You have pain or burning when you urinate, less urine than usual, or pink or bloody urine  · You have questions or concerns about your condition or care  CARE AGREEMENT:   You have the right to help plan your care  Learn about your health condition and how it may be treated  Discuss treatment options with your caregivers to decide what care you want to receive  You always have the right to refuse treatment  The above information is an  only  It is not intended as medical advice for individual conditions or treatments  Talk to your doctor, nurse or pharmacist before following any medical regimen to see if it is safe and effective for you  © 2017 [address]  Information is for End User's use only and may not be sold, redistributed or otherwise used for commercial purposes  All illustrations and images included in CareNotes® are the copyrighted property of A [employer] A M , Inc  or [employer]

## 2019-11-23 PROCEDURE — 99213 OFFICE O/P EST LOW 20 MIN: CPT

## 2019-11-23 RX ORDER — DOCUSATE SODIUM 100 MG/1
100 CAPSULE, LIQUID FILLED ORAL 2 TIMES DAILY
Qty: 60 CAPSULE | Refills: 1 | Status: ON HOLD | OUTPATIENT
Start: 2019-11-23 | End: 2019-12-20

## 2019-11-23 RX ORDER — HYDROXYZINE HYDROCHLORIDE 10 MG/1
10 TABLET, FILM COATED ORAL EVERY 6 HOURS PRN
Qty: 30 TABLET | Refills: 0 | Status: ON HOLD | OUTPATIENT
Start: 2019-11-23 | End: 2019-12-20

## 2019-11-23 NOTE — DISCHARGE INSTRUCTIONS
Anxiety   WHAT YOU NEED TO KNOW:   Anxiety is a condition that causes you to feel extremely worried or nervous  The feelings are so strong that they can cause problems with your daily activities or sleep  Anxiety may be triggered by something you fear, or it may happen without a cause  Family or work stress, smoking, caffeine, and alcohol can increase your risk for anxiety  Certain medicines or health conditions can also increase your risk  Anxiety can become a long-term condition if it is not managed or treated  DISCHARGE INSTRUCTIONS:   Call 911 if:   · You have chest pain, tightness, or heaviness that may spread to your shoulders, arms, jaw, neck, or back  · You feel like hurting yourself or someone else  Contact your healthcare provider if:   · Your symptoms get worse or do not get better with treatment  · Your anxiety keeps you from doing your regular daily activities  · You have new symptoms since your last visit  · You have questions or concerns about your condition or care  Medicines:   · Medicines  may be given to help you feel more calm and relaxed, and decrease your symptoms  · Take your medicine as directed  Contact your healthcare provider if you think your medicine is not helping or if you have side effects  Tell him of her if you are allergic to any medicine  Keep a list of the medicines, vitamins, and herbs you take  Include the amounts, and when and why you take them  Bring the list or the pill bottles to follow-up visits  Carry your medicine list with you in case of an emergency  Follow up with your healthcare provider within 2 weeks or as directed:  Write down your questions so you remember to ask them during your visits  Manage anxiety:   · Talk to someone about your anxiety  Your healthcare provider may suggest counseling  Cognitive behavioral therapy can help you understand and change how you react to events that trigger your symptoms   You might feel more comfortable talking with a friend or family member about your anxiety  Choose someone you know will be supportive and encouraging  · Find ways to relax  Activities such as exercise, meditation, or listening to music can help you relax  Spend time with friends, or do things you enjoy  · Practice deep breathing  Deep breathing can help you relax when you feel anxious  Focus on taking slow, deep breaths several times a day, or during an anxiety attack  Breathe in through your nose and out through your mouth  · Create a regular sleep routine  Regular sleep can help you feel calmer during the day  Go to sleep and wake up at the same times every day  Do not watch television or use the computer right before bed  Your room should be comfortable, dark, and quiet  · Eat a variety of healthy foods  Healthy foods include fruits, vegetables, low-fat dairy products, lean meats, fish, whole-grain breads, and cooked beans  Healthy foods can help you feel less anxious and have more energy  · Exercise regularly  Exercise can increase your energy level  Exercise may also lift your mood and help you sleep better  Your healthcare provider can help you create an exercise plan  · Do not smoke  Nicotine and other chemicals in cigarettes and cigars can increase anxiety  Ask your healthcare provider for information if you currently smoke and need help to quit  E-cigarettes or smokeless tobacco still contain nicotine  Talk to your healthcare provider before you use these products  · Do not have caffeine  Caffeine can make your symptoms worse  Do not have foods or drinks that are meant to increase your energy level  · Limit or do not drink alcohol  Ask your healthcare provider if alcohol is safe for you  You may not be able to drink alcohol if you take certain anxiety or depression medicines  Limit alcohol to 1 drink per day if you are a woman  Limit alcohol to 2 drinks per day if you are a man   A drink of alcohol is 12 ounces of beer, 5 ounces of wine, or 1½ ounces of liquor  · Do not use drugs  Drugs can make your anxiety worse  It can also make anxiety hard to manage  Talk to your healthcare provider if you use drugs and want help to quit  © 2017 [employer] [employer] Information is for End User's use only and may not be sold, redistributed or otherwise used for commercial purposes  All illustrations and images included in CareNotes® are the copyrighted property of A [employer] A M , [employer]  or [employer]  The above information is an  only  It is not intended as medical advice for individual conditions or treatments  Talk to your doctor, nurse or pharmacist before following any medical regimen to see if it is safe and effective for you

## 2019-11-23 NOTE — PROGRESS NOTES
Patient is a 21 yo  1 para 0 at 30 weeks and 0 days by dates and consistent with a 8 weeks 3 day ultrasound on 19  She was seen today for shortness of breath and chest pain noted earlier this evening  In the ambulance she also reported abdominal pain  She denies any feelings of contractions  She reports the abdominal pain is in the left lower quadrant and feels like pressure  She denies any dysuria  She is unsure when her last bowel movement was  She has a history of anxiety  She reports some burning in her mid epigastrium  BP 96/59 (BP Location: Right arm)   Pulse 92   Temp 97 7 °F (36 5 °C) (Oral)   Resp 20   LMP 2019   SpO2 95%    Fetal heart tones are in the 130s reactive good aavy-xo-pdfi variability and no contractions are seen  No D cells are seen  Lungs are clear to auscultation  Heart regular rate rhythm  Abdomen soft appears to be still distended in the upper abdomen patient reports tenderness in the left and right lower quadrant in the area of the round ligaments  An ultrasound was done which showed the fetus is in the transverse position with the head to the maternal right and back down  The placenta is anterior and appears normal   The AMELIA is 13  Cervical length is 4 7 centimeters  Estimated fetal weight is 2 pounds 15 ounces and 29 weeks and 2 days  Neither ovary is able to be visualized  Both kidneys are seen and appear normal and in the appropriate positions  Her upper abdomen is filled with gas  Fetus was breathing and moving actively on US  Extremities show no edema  Pelvic exam done with the resident shows a long closed thick cervix with minimal white thin discharge  KOH and wet mount were negative and there was no ferning    During her exam today with speculum and with a vaginal probe she became anxious and was breathing more frequently and complained of shortness of breath during the episode       No current facility-administered medications on file prior to encounter  Current Outpatient Medications on File Prior to Encounter   Medication Sig Dispense Refill    diphenhydrAMINE (BENADRYL) 50 mg capsule Take 1 capsule (50 mg total) by mouth daily at bedtime as needed for sleep 30 capsule 0    doxylamine (UNISON) 25 MG tablet Take 1 tablet (25 mg total) by mouth daily at bedtime as needed for sleep or nausea 30 tablet 3    Prenatal Vit-Fe Fumarate-FA (PRENATAL 1+1 PO) Take by mouth      Pyridoxine HCl (VITAMIN B-6) 25 MG tablet Take 1 tablet (25 mg total) by mouth daily 30 tablet 3     sertraline (ZOLOFT) 25 mg tablet Take 1 tablet (25 mg total) by mouth daily 30 tablet 2     Allergies   Allergen Reactions    Ibuprofen Hives and Rash     Reaction Date: [id number];        Past Medical History:   Diagnosis Date    Varicella      No past surgical history on file  Assessment and plan 30 week IUP with complaints of chest pain and shortness of breath earlier today and worsening episode when she is anxious during her exam   Her symptoms resolved during her ultrasound  Recommend EKG  I suspect her symptoms are secondary to her anxiety  With a history of reflux will also treat with Pepcid to make sure this is not a cause of her abdominal pain/chest pain  I suspect she is also constipated  I offered a dulcolax suppository which she declined  Recommend colace 100 mg daily upon DC       Florin Feliz MD

## 2019-11-25 LAB
ATRIAL RATE: 97 BPM
P AXIS: 3 DEGREES
PR INTERVAL: 128 MS
QRS AXIS: 53 DEGREES
QRSD INTERVAL: 70 MS
QT INTERVAL: 348 MS
QTC INTERVAL: 441 MS
T WAVE AXIS: 19 DEGREES
VENTRICULAR RATE: 97 BPM

## 2019-11-25 PROCEDURE — 93010 ELECTROCARDIOGRAM REPORT: CPT | Performed by: INTERNAL MEDICINE

## 2019-12-05 PROBLEM — Z3A.31 31 WEEKS GESTATION OF PREGNANCY: Status: ACTIVE | Noted: 2019-06-25

## 2019-12-05 PROBLEM — O99.343 ANXIETY IN PREGNANCY IN THIRD TRIMESTER, ANTEPARTUM: Status: ACTIVE | Noted: 2019-08-20

## 2019-12-06 ENCOUNTER — ULTRASOUND (OUTPATIENT)
Dept: PERINATAL CARE | Facility: CLINIC | Age: 19
End: 2019-12-06
Payer: COMMERCIAL

## 2019-12-06 ENCOUNTER — ROUTINE PRENATAL (OUTPATIENT)
Dept: OBGYN CLINIC | Facility: CLINIC | Age: 19
End: 2019-12-06

## 2019-12-06 ENCOUNTER — PATIENT OUTREACH (OUTPATIENT)
Dept: OBGYN CLINIC | Facility: CLINIC | Age: 19
End: 2019-12-06

## 2019-12-06 VITALS
DIASTOLIC BLOOD PRESSURE: 61 MMHG | BODY MASS INDEX: 29.47 KG/M2 | HEIGHT: 56 IN | WEIGHT: 131 LBS | SYSTOLIC BLOOD PRESSURE: 102 MMHG | HEART RATE: 125 BPM

## 2019-12-06 VITALS
WEIGHT: 131.6 LBS | HEART RATE: 118 BPM | SYSTOLIC BLOOD PRESSURE: 103 MMHG | BODY MASS INDEX: 29.6 KG/M2 | DIASTOLIC BLOOD PRESSURE: 70 MMHG | HEIGHT: 56 IN

## 2019-12-06 DIAGNOSIS — O99.013 ANEMIA DURING PREGNANCY IN THIRD TRIMESTER: ICD-10-CM

## 2019-12-06 DIAGNOSIS — O99.013 ANEMIA DURING PREGNANCY IN THIRD TRIMESTER: Primary | ICD-10-CM

## 2019-12-06 DIAGNOSIS — Z3A.32 32 WEEKS GESTATION OF PREGNANCY: ICD-10-CM

## 2019-12-06 DIAGNOSIS — A74.9 CHLAMYDIA INFECTION AFFECTING PREGNANCY IN FIRST TRIMESTER: ICD-10-CM

## 2019-12-06 DIAGNOSIS — O98.811 CHLAMYDIA INFECTION AFFECTING PREGNANCY IN FIRST TRIMESTER: ICD-10-CM

## 2019-12-06 DIAGNOSIS — O99.343 ANXIETY IN PREGNANCY IN THIRD TRIMESTER, ANTEPARTUM: Primary | ICD-10-CM

## 2019-12-06 DIAGNOSIS — Z87.898 HISTORY OF DOMESTIC VIOLENCE: ICD-10-CM

## 2019-12-06 DIAGNOSIS — F41.9 ANXIETY IN PREGNANCY IN THIRD TRIMESTER, ANTEPARTUM: Primary | ICD-10-CM

## 2019-12-06 DIAGNOSIS — Z3A.30 30 WEEKS GESTATION OF PREGNANCY: ICD-10-CM

## 2019-12-06 DIAGNOSIS — F41.9 ANXIETY: ICD-10-CM

## 2019-12-06 DIAGNOSIS — Z23 NEED FOR TDAP VACCINATION: ICD-10-CM

## 2019-12-06 LAB — EXTERNAL GROUP B STREP ANTIGEN: NEGATIVE

## 2019-12-06 PROCEDURE — 76816 OB US FOLLOW-UP PER FETUS: CPT | Performed by: OBSTETRICS & GYNECOLOGY

## 2019-12-06 PROCEDURE — 99213 OFFICE O/P EST LOW 20 MIN: CPT | Performed by: NURSE PRACTITIONER

## 2019-12-06 PROCEDURE — 90471 IMMUNIZATION ADMIN: CPT

## 2019-12-06 PROCEDURE — 90715 TDAP VACCINE 7 YRS/> IM: CPT

## 2019-12-06 NOTE — PROGRESS NOTES
EARNESTINE met with Pt on her request  Pt reported she is at [employer] but will move with mother in [place] at the end of the month  Pt was advice to get [facility] information to request her medical records  Pt verbalized understanding, States work is going well  Pt was educated about Post partum depression sign and symptoms  Verbalized understanding  Will contact [facility] as needed

## 2019-12-06 NOTE — PROGRESS NOTES
Denies loss of fluid, vaginal bleeding and abdominal pain  Confirms frequent fetal movement  Tolerating prenatal vitamins and iron well  Patient states anxiety and depression have decreased is not taking Atarax or Zoloft at this time  Feels as if she is doing well and does not need 2  Reviewed recommendation for Tdap vaccine, patient is agreeable to administration at today's visit  Patient may be moving to [place] is unsure whether she is going to go prior to pregnancy or after pregnancy  Encouraged patient to find a provider in [place] prior to moving will send records so transition is seen list and she does not miss prenatal appointments  Patient verbalizes understanding  Plan:  1  Continue prenatal vitamins daily  2  Continue fetal kick counts daily  3  Maternal anemia-continue iron 1 tablet every other day  increasing dietary sources of iron reviewed  4  Anxiety/depression     - patient met with social work today     - self weaned Zoloft and Atarax  Does not believe she needs taken at this time  Encouraged patient to call office with worsening symptoms, thoughts of self-harm or harm to others  5  Social issues is currently still living in a shelter  Is planning to move to [place] with her mother  [facility] is not involved and has incarcerated     -patient will sign release of records at today's visit  Encouraged patient to obtain Ob/gyn where she is moving to allow transition of care without care interruption  6  Tdap today  [address] appointment today  8  Common discomforts of pregnancy and precautions including  delivery reviewed  RTO 2 weeks f/u US

## 2019-12-10 ENCOUNTER — HOSPITAL ENCOUNTER (OUTPATIENT)
Facility: HOSPITAL | Age: 19
Discharge: HOME | End: 2019-12-11
Attending: EMERGENCY MEDICINE | Admitting: STUDENT IN AN ORGANIZED HEALTH CARE EDUCATION/TRAINING PROGRAM
Payer: COMMERCIAL

## 2019-12-10 DIAGNOSIS — S00.512A ABRASION OF TONGUE, INITIAL ENCOUNTER: ICD-10-CM

## 2019-12-10 DIAGNOSIS — Z34.93 PREGNANT AND NOT YET DELIVERED IN THIRD TRIMESTER: ICD-10-CM

## 2019-12-10 DIAGNOSIS — R10.9 ABDOMINAL CRAMPING: Primary | ICD-10-CM

## 2019-12-10 DIAGNOSIS — R56.9 SEIZURE (HCC): ICD-10-CM

## 2019-12-10 LAB
ALBUMIN SERPL BCP-MCNC: 2.7 G/DL (ref 3.5–5)
ALP SERPL-CCNC: 94 U/L (ref 46–116)
ALT SERPL W P-5'-P-CCNC: 17 U/L (ref 12–78)
ANION GAP SERPL CALCULATED.3IONS-SCNC: 8 MMOL/L (ref 4–13)
APTT PPP: 24 SECONDS (ref 23–37)
AST SERPL W P-5'-P-CCNC: 17 U/L (ref 5–45)
BASOPHILS # BLD AUTO: 0.02 THOUSANDS/ΜL (ref 0–0.1)
BASOPHILS NFR BLD AUTO: 0 % (ref 0–1)
BILIRUB SERPL-MCNC: 0.13 MG/DL (ref 0.2–1)
BUN SERPL-MCNC: 5 MG/DL (ref 5–25)
CALCIUM SERPL-MCNC: 8.5 MG/DL (ref 8.3–10.1)
CHLORIDE SERPL-SCNC: 109 MMOL/L (ref 100–108)
CO2 SERPL-SCNC: 23 MMOL/L (ref 21–32)
CREAT SERPL-MCNC: 0.45 MG/DL (ref 0.6–1.3)
EOSINOPHIL # BLD AUTO: 0.19 THOUSAND/ΜL (ref 0–0.61)
EOSINOPHIL NFR BLD AUTO: 2 % (ref 0–6)
ERYTHROCYTE [DISTWIDTH] IN BLOOD BY AUTOMATED COUNT: 13.2 % (ref 11.6–15.1)
EXT PROTEIN, UA: 100
GFR SERPL CREATININE-BSD FRML MDRD: 145 ML/MIN/1.73SQ M
GLUCOSE SERPL-MCNC: 98 MG/DL (ref 65–140)
HCT VFR BLD AUTO: 25.2 % (ref 34.8–46.1)
HGB BLD-MCNC: 8.3 G/DL (ref 11.5–15.4)
IMM GRANULOCYTES # BLD AUTO: 0.11 THOUSAND/UL (ref 0–0.2)
IMM GRANULOCYTES NFR BLD AUTO: 1 % (ref 0–2)
INR PPP: 1.02 (ref 0.84–1.19)
LYMPHOCYTES # BLD AUTO: 2.85 THOUSANDS/ΜL (ref 0.6–4.47)
LYMPHOCYTES NFR BLD AUTO: 23 % (ref 14–44)
MCH RBC QN AUTO: 27.6 PG (ref 26.8–34.3)
MCHC RBC AUTO-ENTMCNC: 32.9 G/DL (ref 31.4–37.4)
MCV RBC AUTO: 84 FL (ref 82–98)
MONOCYTES # BLD AUTO: 1.31 THOUSAND/ΜL (ref 0.17–1.22)
MONOCYTES NFR BLD AUTO: 11 % (ref 4–12)
NEUTROPHILS # BLD AUTO: 8.01 THOUSANDS/ΜL (ref 1.85–7.62)
NEUTS SEG NFR BLD AUTO: 63 % (ref 43–75)
NRBC BLD AUTO-RTO: 0 /100 WBCS
PLATELET # BLD AUTO: 239 THOUSANDS/UL (ref 149–390)
PMV BLD AUTO: 10.2 FL (ref 8.9–12.7)
POTASSIUM SERPL-SCNC: 3.4 MMOL/L (ref 3.5–5.3)
PROT SERPL-MCNC: 6.5 G/DL (ref 6.4–8.2)
PROTHROMBIN TIME: 13 SECONDS (ref 11.6–14.5)
RBC # BLD AUTO: 3.01 MILLION/UL (ref 3.81–5.12)
SODIUM SERPL-SCNC: 140 MMOL/L (ref 136–145)
WBC # BLD AUTO: 12.49 THOUSAND/UL (ref 4.31–10.16)

## 2019-12-10 PROCEDURE — 86850 RBC ANTIBODY SCREEN: CPT | Performed by: EMERGENCY MEDICINE

## 2019-12-10 PROCEDURE — 80053 COMPREHEN METABOLIC PANEL: CPT | Performed by: EMERGENCY MEDICINE

## 2019-12-10 PROCEDURE — 76815 OB US LIMITED FETUS(S): CPT | Performed by: EMERGENCY MEDICINE

## 2019-12-10 PROCEDURE — 86901 BLOOD TYPING SEROLOGIC RH(D): CPT | Performed by: EMERGENCY MEDICINE

## 2019-12-10 PROCEDURE — 85730 THROMBOPLASTIN TIME PARTIAL: CPT | Performed by: EMERGENCY MEDICINE

## 2019-12-10 PROCEDURE — 86900 BLOOD TYPING SEROLOGIC ABO: CPT | Performed by: EMERGENCY MEDICINE

## 2019-12-10 PROCEDURE — 84156 ASSAY OF PROTEIN URINE: CPT | Performed by: EMERGENCY MEDICINE

## 2019-12-10 PROCEDURE — 81002 URINALYSIS NONAUTO W/O SCOPE: CPT | Performed by: EMERGENCY MEDICINE

## 2019-12-10 PROCEDURE — 85610 PROTHROMBIN TIME: CPT | Performed by: EMERGENCY MEDICINE

## 2019-12-10 PROCEDURE — 36415 COLL VENOUS BLD VENIPUNCTURE: CPT | Performed by: EMERGENCY MEDICINE

## 2019-12-10 PROCEDURE — 82570 ASSAY OF URINE CREATININE: CPT | Performed by: EMERGENCY MEDICINE

## 2019-12-10 PROCEDURE — 85025 COMPLETE CBC W/AUTO DIFF WBC: CPT | Performed by: EMERGENCY MEDICINE

## 2019-12-10 PROCEDURE — 99285 EMERGENCY DEPT VISIT HI MDM: CPT | Performed by: EMERGENCY MEDICINE

## 2019-12-10 PROCEDURE — 99285 EMERGENCY DEPT VISIT HI MDM: CPT

## 2019-12-11 ENCOUNTER — APPOINTMENT (EMERGENCY)
Dept: RADIOLOGY | Facility: HOSPITAL | Age: 19
End: 2019-12-11
Payer: COMMERCIAL

## 2019-12-11 ENCOUNTER — HOSPITAL ENCOUNTER (EMERGENCY)
Facility: HOSPITAL | Age: 19
Discharge: HOME | End: 2019-12-11
Attending: EMERGENCY MEDICINE | Admitting: EMERGENCY MEDICINE
Payer: COMMERCIAL

## 2019-12-11 ENCOUNTER — TELEPHONE (OUTPATIENT)
Dept: NEUROLOGY | Facility: CLINIC | Age: 19
End: 2019-12-11

## 2019-12-11 VITALS
HEART RATE: 112 BPM | RESPIRATION RATE: 18 BRPM | OXYGEN SATURATION: 96 % | SYSTOLIC BLOOD PRESSURE: 112 MMHG | DIASTOLIC BLOOD PRESSURE: 57 MMHG | TEMPERATURE: 98.2 F

## 2019-12-11 VITALS
WEIGHT: 135 LBS | HEIGHT: 56 IN | HEART RATE: 108 BPM | OXYGEN SATURATION: 98 % | DIASTOLIC BLOOD PRESSURE: 53 MMHG | BODY MASS INDEX: 30.37 KG/M2 | RESPIRATION RATE: 20 BRPM | SYSTOLIC BLOOD PRESSURE: 94 MMHG | TEMPERATURE: 98 F

## 2019-12-11 DIAGNOSIS — R56.9 SEIZURE (HCC): Primary | ICD-10-CM

## 2019-12-11 LAB
ABO GROUP BLD: NORMAL
BLD GP AB SCN SERPL QL: NEGATIVE
CREAT UR-MCNC: 42.7 MG/DL
PROT UR-MCNC: 56 MG/DL
PROT/CREAT UR: 1.31 MG/G{CREAT} (ref 0–0.1)
RH BLD: POSITIVE
SPECIMEN EXPIRATION DATE: NORMAL

## 2019-12-11 PROCEDURE — 99285 EMERGENCY DEPT VISIT HI MDM: CPT | Performed by: EMERGENCY MEDICINE

## 2019-12-11 PROCEDURE — 99214 OFFICE O/P EST MOD 30 MIN: CPT

## 2019-12-11 PROCEDURE — 76817 TRANSVAGINAL US OBSTETRIC: CPT | Performed by: OBSTETRICS & GYNECOLOGY

## 2019-12-11 PROCEDURE — 70450 CT HEAD/BRAIN W/O DYE: CPT

## 2019-12-11 PROCEDURE — 99285 EMERGENCY DEPT VISIT HI MDM: CPT

## 2019-12-11 PROCEDURE — 59025 FETAL NON-STRESS TEST: CPT | Performed by: OBSTETRICS & GYNECOLOGY

## 2019-12-11 RX ORDER — LEVETIRACETAM 500 MG/1
500 TABLET ORAL EVERY 12 HOURS SCHEDULED
Qty: 60 TABLET | Refills: 2 | Status: ON HOLD | OUTPATIENT
Start: 2019-12-11 | End: 2019-12-21 | Stop reason: SDUPTHER

## 2019-12-11 RX ORDER — ONDANSETRON 2 MG/ML
4 INJECTION INTRAMUSCULAR; INTRAVENOUS EVERY 6 HOURS PRN
Status: DISCONTINUED | OUTPATIENT
Start: 2019-12-11 | End: 2019-12-11 | Stop reason: HOSPADM

## 2019-12-11 RX ORDER — LEVETIRACETAM 500 MG/1
500 TABLET ORAL ONCE
Status: COMPLETED | OUTPATIENT
Start: 2019-12-11 | End: 2019-12-11

## 2019-12-11 RX ADMIN — SODIUM CHLORIDE, SODIUM LACTATE, POTASSIUM CHLORIDE, AND CALCIUM CHLORIDE 1000 ML: .6; .31; .03; .02 INJECTION, SOLUTION INTRAVENOUS at 01:27

## 2019-12-11 RX ADMIN — LEVETIRACETAM 500 MG: 500 TABLET, FILM COATED ORAL at 06:30

## 2019-12-11 NOTE — TELEPHONE ENCOUNTER
Tried contacting patient once  LMOM  If patient calls back we need to find out:  *If patient wants to follow up with our practice  Last telephone encounter with [facility] patient stated she is moving out of state to [place] and declined making an appointment with their office  *Do patient have reliable transportation? *Is the patient able to go to  or is [facility] the preferred location  Left my direct extension for patient to contact me

## 2019-12-11 NOTE — ED NOTES
Urine dip performed at 2300 with no automatic feed into Epic system   Abnormalities from dipstick noted in chart     Jeanette Fuentes RN  12/10/19 [id number]

## 2019-12-11 NOTE — TELEPHONE ENCOUNTER
Spoke with Divya, per Divya patients insurance requires a physician referral, has the patient seen her PCP within this year, verify PCP with patient    If not seen within this year PCP could refuse to a physician referral

## 2019-12-11 NOTE — PROGRESS NOTES
Received pt from ER  Pt c/o heachache pain of 7/10, thinks she may have hit her head when she fell earlier  Per ER RN who accompanied pt to floor, this is a new complaint from pt  Per ER RN, pt has history of one seizure a few days ago, and now had a seizure at home today  Per ER RN, CBCD and CMP were drawn, no other diagnostic tests have been completed  Pt found to have sore, swollen, lacerated tongue  Per pt's friend Berna Harvey, pt bit her tongue when she had her seizure earlier  Pt very sleepy, does answer questions quietly with one or two words, then closes her eyes

## 2019-12-11 NOTE — TELEPHONE ENCOUNTER
LMOM  Calling to schedule patient with Dr Sunny Presley 12/13 @ [facility] is currently on hold for patient  Per Dr Fautma Martin this patient needs a 60 min slot (pregnant)

## 2019-12-11 NOTE — PROCEDURES
rosi Villavicencio  at [address] [facility] with an MICHAEL of 2020, by Last Menstrual Period, was seen at [address] [facility] for the following procedure(s): $Procedure Type: NST, US - Transvaginal]    Nonstress Test  Reason for NST: Routine  Variability: Moderate  Decelerations: None  Accelerations: Yes  Acoustic Stimulator: No  Baseline: 120 BPM  Uterine Irritability: No  Contractions: Irregular              Ultrasound Other  Fetal Presentation: Vertex  Cervical Length: 3 9  Funnel: No  Debris: No  Placenta Previa: No  Vasa Previa: No    Interpretation  Nonstress Test Interpretation: Reactive  Overall Impression: Reassuring    Ultrasound Probe Disinfection    A transvaginal ultrasound was performed     Prior to use, disinfection was performed with High Level Disinfection Process (Trophon)  Probe serial number [id number] was used    Julia Rosario MD  19  2:22 AM

## 2019-12-11 NOTE — PROGRESS NOTES
Called to pt bedside by pt's friend Hiral Kaur at [phone]  Friend states pt suddenly sat upright, reached arms out  Friend believes pt "may have had a seizure "  Pt sleepy but able to answer questions  Per pt, she sat upright in bed because she had a sudden leg cramp, felt short of breath, and felt like she was "catching a seizure "  Notified Dr Alexia Gao who was unavailable to come into room  Notified Dr Raffaele Evans, who came to room immediately to evaluate pt  Received order for LR bolus

## 2019-12-11 NOTE — ED PROVIDER NOTES
History  Chief Complaint   Patient presents with    Seizure - Pregnant     pt hx of seizures seized at home unwitnessed  Pt no hx of ecclempsia during pregnancy  pt 32 weeks pregnant reporting normal pregnancy  Pt reporting "a little bitof cramping in my belly"     Patient is a 25yo  presenting to the ED after unwitnessed seizure that occurred approximately 30 minutes prior to ED arrival  Per EMS and the patient, the patient lives in a homeless shelter and was returning from the bathroom when she yelled for help  Someone heard her yelling, and went to help her lie down on the bed prior to leaving her to call EMS  On EMS arrival, patient found lying in her bed, postictal, with no complaints  Upon arrival to the ED, patient GCS 15, complaining only of L-sided abdominal cramping  Per the patient, she has a history of seizures since childhood, and is not on any antiepileptic medications  She had a seizure 4 days ago and was taken to [facility], admitted to [facility], and discharged  She has no prior hx of preeclampsia/eclampsia  She denies head strike/recent trauma/falls, she denies LE edema, frothy/foamy urine, headache, vision changes, rash, recent illness, neck pain/stiffness, fevers, urinary symptoms, vaginal bleeding/discharge  Denies EtOH, denies drug use  No hx DVT/PE  She is alert, oriented, on exam pupils 4-5mm b/l, no focal deficits, normotensive, no LE edema, she has abrasions/ecchymosis, and superficial tongue laceration  Normal blood glucose         History provided by:  Patient and EMS personnel   used: No    Seizure - Pregnant   Seizure activity on arrival: no    Seizure type:  Unable to specify  Preceding symptoms: aura    Initial focality:  Unable to specify  Episode characteristics: confusion and tongue biting    Postictal symptoms: confusion    Return to baseline: yes    Severity:  Unable to specify  Timing:  Unable to specify  Progression:  Unchanged  Context: pregnancy        Prior to Admission Medications   Prescriptions Last Dose Informant Patient Reported? Taking? Prenatal Vit-Fe Fumarate-FA (PRENATAL 1+1 PO) 12/10/2019 at Unknown time Self Yes Yes   Sig: Take by mouth   Pyridoxine HCl (VITAMIN B-6) 25 MG tablet More than a month at Unknown time Self No No   Sig: Take 1 tablet (25 mg total) by mouth daily   diphenhydrAMINE (BENADRYL) 50 mg capsule Past Month at Unknown time Self No Yes   Sig: Take 1 capsule (50 mg total) by mouth daily at bedtime as needed for sleep   docusate sodium (COLACE) 100 mg capsule More than a month at Unknown time Self No No   Sig: Take 1 capsule (100 mg total) by mouth 2 (two) times a day   Patient not taking: Reported on 12/6/2019   doxylamine (UNISON) 25 MG tablet More than a month at Unknown time Self No No   Sig: Take 1 tablet (25 mg total) by mouth daily at bedtime as needed for sleep or nausea   Patient not taking: Reported on 12/6/2019   ferrous sulfate 324 (65 Fe) mg More than a month at Unknown time Self No No   Sig: Take 1 tablet (324 mg total) by mouth every other day   hydrOXYzine HCL (ATARAX) 10 mg tablet More than a month at Unknown time Self No No   Sig: Take 1 tablet (10 mg total) by mouth every 6 (six) hours as needed for anxiety   Patient not taking: Reported on 12/6/2019   sertraline (ZOLOFT) 50 mg tablet More than a month at Unknown time Self No No   Sig: Take 1 tablet (50 mg total) by mouth daily   Patient not taking: Reported on 12/6/2019      Facility-Administered Medications: None       Past Medical History:   Diagnosis Date    Varicella        History reviewed  No pertinent surgical history      Family History   Problem Relation Age of Onset    Fibroids Mother     No Known Problems Father     Asthma Brother     Diabetes Maternal Grandmother     Breast cancer Maternal Grandmother     Brain cancer Maternal Grandmother     Diabetes Maternal Grandfather     Dementia Maternal Grandfather     Diabetes Paternal Grandmother     No Known Problems Paternal Grandfather      I have reviewed and agree with the history as documented  Social History     Tobacco Use    Smoking status: Never Smoker    Smokeless tobacco: Never Used   Substance Use Topics    Alcohol use: Not Currently    Drug use: Not Currently     Types: Marijuana        Review of Systems   Constitutional: Negative  Negative for appetite change, chills and fever  HENT: Negative  Eyes: Negative  Negative for photophobia and visual disturbance  Respiratory: Negative  Negative for cough, chest tightness and shortness of breath  Cardiovascular: Negative  Negative for chest pain and leg swelling  Gastrointestinal: Positive for abdominal pain  Negative for blood in stool, constipation, diarrhea, nausea and vomiting  Endocrine: Negative  Genitourinary: Negative  Negative for difficulty urinating, dysuria, flank pain, frequency and urgency  Musculoskeletal: Negative  Negative for back pain, neck pain and neck stiffness  Skin: Negative  Allergic/Immunologic: Negative  Neurological: Positive for seizures  Negative for dizziness, weakness, light-headedness and headaches  Hematological: Negative  Psychiatric/Behavioral: Negative  Physical Exam  ED Triage Vitals   Temperature Pulse Respirations Blood Pressure SpO2   12/10/19 2248 12/10/19 2302 12/10/19 2302 12/10/19 2302 12/10/19 2302   (!) 97 1 °F (36 2 °C) (!) 113 18 106/56 96 %      Temp Source Heart Rate Source Patient Position - Orthostatic VS BP Location FiO2 (%)   12/10/19 2248 12/10/19 2302 12/10/19 2302 12/10/19 2302 --   Oral Monitor Lying Right arm       Pain Score       12/10/19 2300       No Pain             Orthostatic Vital Signs  Vitals:    12/10/19 2330 12/11/19 0011 12/11/19 0100 12/11/19 0226   BP: 103/55  102/58 94/53   Pulse: 104 (!) 109 100 (!) 108   Patient Position - Orthostatic VS: Lying          Physical Exam   Constitutional: She is oriented to person, place, and time   She appears well-developed and well-nourished  HENT:   Head: Normocephalic and atraumatic  Right Ear: External ear normal    Left Ear: External ear normal    Nose: Nose normal    Mouth/Throat: Oropharynx is clear and moist        Eyes: Pupils are equal, round, and reactive to light  Conjunctivae and EOM are normal  No scleral icterus  Neck: Normal range of motion  No JVD present  No tracheal deviation present  Cardiovascular: Regular rhythm, normal heart sounds and intact distal pulses  No murmur heard  Tachycardia    Pulmonary/Chest: Effort normal and breath sounds normal  No respiratory distress  Abdominal: Soft  Bowel sounds are normal  She exhibits no distension  There is no tenderness  There is no guarding  Gravid uterus, fundal height > half-way between umbilicus and xiphoid process   Musculoskeletal: Normal range of motion  She exhibits no edema  Neurological: She is alert and oriented to person, place, and time  She exhibits normal muscle tone  Patient is alert and oriented to person, place, time, and situation  Speech is normal with no dysarthria, no aphasia  Cranial nerves II-XII intact  Sensation is intact bilaterally upper and lower extremities  Normal muscle tone, no clonus, no atrophy  5/5 muscle strength bilaterally upper extremities, 5/5 muscle strength bilaterally lower extremities  Finger-to-nose, heel-to-shin testing normal bilaterally  No pronator drift  Skin: Skin is warm and dry  Capillary refill takes less than 2 seconds  She is not diaphoretic  Psychiatric: She has a normal mood and affect  Her behavior is normal    Vitals reviewed        ED Medications  Medications   lactated ringers bolus 1,000 mL (0 mL Intravenous Stopped 12/11/19 0227)       Diagnostic Studies  Results Reviewed     Procedure Component Value Units Date/Time    Protein / creatinine ratio, urine [[id number]]  (Abnormal) Collected:  12/10/19 2300    Lab Status:  Final result Specimen:  Urine, Clean Catch Updated:  12/11/19 0057     Creatinine, Ur 42 7 mg/dL      Protein Urine Random 56 mg/dL      Prot/Creat Ratio, Ur 1 31    POCT urinalysis dipstick [[id number]]  (Normal) Resulted:  12/10/19 2348    Lab Status:  Final result Updated:  12/10/19 2348     Protein, UA (Ref: Negative) 100    Protime-INR [[id number]]  (Normal) Collected:  12/10/19 2253    Lab Status:  Final result Specimen:  Blood from Arm, Right Updated:  12/10/19 2339     Protime 13 0 seconds      INR 1 02    APTT [[id number]]  (Normal) Collected:  12/10/19 2253    Lab Status:  Final result Specimen:  Blood from Arm, Right Updated:  12/10/19 2339     PTT 24 seconds     Comprehensive metabolic panel [[id number]]  (Abnormal) Collected:  12/10/19 2253    Lab Status:  Final result Specimen:  Blood from Arm, Right Updated:  12/10/19 2321     Sodium 140 mmol/L      Potassium 3 4 mmol/L      Chloride 109 mmol/L      CO2 23 mmol/L      ANION GAP 8 mmol/L      BUN 5 mg/dL      Creatinine 0 45 mg/dL      Glucose 98 mg/dL      Calcium 8 5 mg/dL      AST 17 U/L      ALT 17 U/L      Alkaline Phosphatase 94 U/L      Total Protein 6 5 g/dL      Albumin 2 7 g/dL      Total Bilirubin 0 13 mg/dL      eGFR 145 ml/min/1 73sq m     Narrative:       [place] guidelines for Chronic Kidney Disease (CKD):     Stage 1 with normal or high GFR (GFR > 90 mL/min/1 73 square meters)    Stage 2 Mild CKD (GFR = 60-89 mL/min/1 73 square meters)    Stage 3A Moderate CKD (GFR = 45-59 mL/min/1 73 square meters)    Stage 3B Moderate CKD (GFR = 30-44 mL/min/1 73 square meters)    Stage 4 Severe CKD (GFR = 15-29 mL/min/1 73 square meters)    Stage 5 End Stage CKD (GFR <15 mL/min/1 73 square meters)  Note: GFR calculation is accurate only with a steady state creatinine    CBC and differential [[id number]]  (Abnormal) Collected:  12/10/19 2253    Lab Status:  Final result Specimen:  Blood from Arm, Right Updated:  12/10/19 2306     WBC 12 49 Thousand/uL      RBC 3 01 Million/uL      Hemoglobin 8 3 g/dL      Hematocrit 25 2 %      MCV 84 fL      MCH 27 6 pg      MCHC 32 9 g/dL      RDW 13 2 %      MPV 10 2 fL      Platelets 000 Thousands/uL      nRBC 0 /100 WBCs      Neutrophils Relative 63 %      Immat GRANS % 1 %      Lymphocytes Relative 23 %      Monocytes Relative 11 %      Eosinophils Relative 2 %      Basophils Relative 0 %      Neutrophils Absolute 8 01 Thousands/µL      Immature Grans Absolute 0 11 Thousand/uL      Lymphocytes Absolute 2 85 Thousands/µL      Monocytes Absolute 1 31 Thousand/µL      Eosinophils Absolute 0 19 Thousand/µL      Basophils Absolute 0 02 Thousands/µL                  No orders to display         Procedures  Procedures      ED Course                               MDM  Number of Diagnoses or Management Options  Abdominal cramping: new and requires workup  Abrasion of tongue, initial encounter: new and does not require workup  Pregnant and not yet delivered in third trimester: new and does not require workup  Seizure [facility]): new and requires workup  Diagnosis management comments: Seizure with prior hx of seizures  Normotensive currently w/ no prior hx preeclampsia   No evidence of head trauma   Patient complains of abd pain--, active fetus on transabdominal US  Labs including CMP to evaluate for elevated bili, LFTs, CBC to evaluate for anemia/thrombocytopenia, UA to evaluate for proteinuria, coags  Send to L&D          Amount and/or Complexity of Data Reviewed  Clinical lab tests: ordered and reviewed  Decide to obtain previous medical records or to obtain history from someone other than the patient: yes  Obtain history from someone other than the patient: yes  Review and summarize past medical records: yes  Independent visualization of images, tracings, or specimens: yes          Disposition  Final diagnoses:   Seizure ([facility] [address] )   Pregnant and not yet delivered in third trimester   Abdominal cramping   Abrasion of tongue, initial encounter Time reflects when diagnosis was documented in both MDM as applicable and the Disposition within this note     Time User Action Codes Description Comment    12/10/2019 11:43 PM Fayrmike Camilo [R56 9] Seizure ([facility] [address] )     12/10/2019 11:44 PM Lopez Torres Add [Z34 93] Pregnant and not yet delivered in third trimester     12/10/2019 11:44 PM Lopez Torres Add [R10 9] Abdominal cramping     12/10/2019 11:44 PM Danielle Go [R56 9] Seizure ([facility] [address] )     12/10/2019 11:44 PM Lopez Torres Modify [R10 9] Abdominal cramping     12/10/2019 11:45 PM Lopez Torres Add [S00 512A] Abrasion of tongue, initial encounter       ED Disposition     ED Disposition Condition Date/Time Comment    Send to L&D After Provider Sharmin Durbin Dec 10, 2019 11:05 PM       Follow-up Information    None         Discharge Medication List as of 12/11/2019  2:20 AM      CONTINUE these medications which have NOT CHANGED    Details   diphenhydrAMINE (BENADRYL) 50 mg capsule Take 1 capsule (50 mg total) by mouth daily at bedtime as needed for sleep, Starting Mon 11/4/2019, Normal      Prenatal Vit-Fe Fumarate-FA (PRENATAL 1+1 PO) Take by mouth, Historical Med      docusate sodium (COLACE) 100 mg capsule Take 1 capsule (100 mg total) by mouth 2 (two) times a day, Starting Sat 11/23/2019, Normal      doxylamine (UNISON) 25 MG tablet Take 1 tablet (25 mg total) by mouth daily at bedtime as needed for sleep or nausea, Starting Mon 9/9/2019, Normal      ferrous sulfate 324 (65 Fe) mg Take 1 tablet (324 mg total) by mouth every other day, Starting Fri 11/22/2019, Normal      hydrOXYzine HCL (ATARAX) 10 mg tablet Take 1 tablet (10 mg total) by mouth every 6 (six) hours as needed for anxiety, Starting Sat 11/23/2019, Normal      Pyridoxine HCl (VITAMIN B-6) 25 MG tablet Take 1 tablet (25 mg total) by mouth daily, Starting Mon 9/9/2019, Normal      sertraline (ZOLOFT) 50 mg tablet Take 1 tablet (50 mg total) by mouth daily, Starting Fri 11/22/2019, Normal           No discharge procedures on file  ED Provider  Attending physically available and evaluated [facility]  I managed the patient along with the ED Attending      Electronically Signed by         Krista Qiu DO  12/11/19 4586

## 2019-12-11 NOTE — ED NOTES
Dr Andrez Alford at bedside     [facility], [address] [facility]  12/10/19 6150

## 2019-12-11 NOTE — ED NOTES
OB charge called for heads up   ED RN to transport pt to OB at this time     Shira Griffin RN  12/10/19 5630

## 2019-12-11 NOTE — ED PROVIDER NOTES
History  Chief Complaint   Patient presents with    Seizure - Pregnant     Patient reports having a seizure at home + head strike Patient 32 weeks pregnant     HPI     42-year-old female presents for 2nd evaluation after seizure last night  Per friend, patient had an unwitnessed seizure last night at group home causing patient to fall to the floor with + head strike  Patient evaluated in the ED last night after seizure and presented postictal  Blood work sent at the time and no acute abnormalities found  Patient was sent to [facility] floor for evaluation due to abdominal cramping and brought back to the ED today because patient complained of a constant headache  Patient currently denies a headache and says she is very tired  She denies any other complaints  Per patient, she had a seizure at the age of 14years old and another one last week and was evaluated at [facility]  Patient is not on any antiepileptics  Prior to Admission Medications   Prescriptions Last Dose Informant Patient Reported? Taking?    Prenatal Vit-Fe Fumarate-FA (PRENATAL 1+1 PO)  Self Yes No   Sig: Take by mouth   Pyridoxine HCl (VITAMIN B-6) 25 MG tablet  Self No No   Sig: Take 1 tablet (25 mg total) by mouth daily   diphenhydrAMINE (BENADRYL) 50 mg capsule  Self No No   Sig: Take 1 capsule (50 mg total) by mouth daily at bedtime as needed for sleep   docusate sodium (COLACE) 100 mg capsule  Self No No   Sig: Take 1 capsule (100 mg total) by mouth 2 (two) times a day   Patient not taking: Reported on 12/6/2019   doxylamine (UNISON) 25 MG tablet  Self No No   Sig: Take 1 tablet (25 mg total) by mouth daily at bedtime as needed for sleep or nausea   Patient not taking: Reported on 12/6/2019   ferrous sulfate 324 (65 Fe) mg  Self No No   Sig: Take 1 tablet (324 mg total) by mouth every other day   hydrOXYzine HCL (ATARAX) 10 mg tablet  Self No No   Sig: Take 1 tablet (10 mg total) by mouth every 6 (six) hours as needed for anxiety   Patient not taking: Reported on 12/6/2019   sertraline (ZOLOFT) 50 mg tablet  Self No No   Sig: Take 1 tablet (50 mg total) by mouth daily   Patient not taking: Reported on 12/6/2019      Facility-Administered Medications: None       Past Medical History:   Diagnosis Date    Varicella        No past surgical history on file  Family History   Problem Relation Age of Onset    Fibroids Mother     No Known Problems Father     Asthma Brother     Diabetes Maternal Grandmother     Breast cancer Maternal Grandmother     Brain cancer Maternal Grandmother     Diabetes Maternal Grandfather     Dementia Maternal Grandfather     Diabetes Paternal Grandmother     No Known Problems Paternal Grandfather      I have reviewed and agree with the history as documented  Social History     Tobacco Use    Smoking status: Never Smoker    Smokeless tobacco: Never Used   Substance Use Topics    Alcohol use: Not Currently    Drug use: Not Currently     Types: Marijuana        Review of Systems   Constitutional: Negative for chills, diaphoresis, fatigue and fever  HENT: Negative for congestion, rhinorrhea and sore throat  Eyes: Negative for photophobia and visual disturbance  Respiratory: Negative for cough, chest tightness and shortness of breath  Cardiovascular: Negative for chest pain and palpitations  Gastrointestinal: Negative for abdominal pain, blood in stool, constipation, diarrhea, nausea and vomiting  Genitourinary: Negative for dysuria, frequency and hematuria  Musculoskeletal: Negative for back pain, gait problem, myalgias, neck pain and neck stiffness  Skin: Negative for pallor and rash  Neurological: Positive for seizures and headaches  Negative for weakness, light-headedness and numbness  Hematological: Negative for adenopathy  Does not bruise/bleed easily  All other systems reviewed and are negative        Physical Exam  ED Triage Vitals [12/11/19 0247]   Temperature Pulse Respirations Blood Pressure SpO2   98 2 °F (36 8 °C) 105 18 124/74 98 %      Temp Source Heart Rate Source Patient Position - Orthostatic VS BP Location FiO2 (%)   Oral Monitor Lying Left arm --      Pain Score       No Pain             Orthostatic Vital Signs  Vitals:    12/11/19 0247 12/11/19 0519   BP: 124/74 112/57   Pulse: 105 (!) 112   Patient Position - Orthostatic VS: Lying Lying       Physical Exam   Constitutional: She is oriented to person, place, and time  She appears well-developed and well-nourished  No distress  Patient is alert and oriented, appears well and nontoxic, in no acute distress   HENT:   Head: Normocephalic  Mouth/Throat: Oropharynx is clear and moist  No oropharyngeal exudate  No obvious signs of head trauma  Bilateral tongue bites   Eyes: Pupils are equal, round, and reactive to light  Conjunctivae and EOM are normal    Neck: Normal range of motion  Neck supple  No C-spine tenderness on palpation   Cardiovascular: Normal rate, regular rhythm, normal heart sounds and intact distal pulses  Pulmonary/Chest: Effort normal and breath sounds normal  No respiratory distress  Abdominal: Soft  Bowel sounds are normal  She exhibits no distension  There is no tenderness  Musculoskeletal: Normal range of motion  Lymphadenopathy:     She has no cervical adenopathy  Neurological: She is alert and oriented to person, place, and time  No facial asymmetry noted, CN 2-12 intact, full ROM of upper and lower extremities, muscle strength 5/5 throughout, sensation intact throughout   Skin: Skin is warm and dry  Capillary refill takes less than 2 seconds  No rash noted  She is not diaphoretic  No erythema  No pallor  Psychiatric: She has a normal mood and affect  Her behavior is normal  Judgment and thought content normal    Nursing note and vitals reviewed        ED Medications  Medications   levETIRAcetam (KEPPRA) tablet 500 mg (500 mg Oral Given 12/11/19 0630)       Diagnostic Studies  Results Reviewed None                 CT head without contrast   Final Result by Fabiola Montanez MD (12/11 9511)      No acute intracranial abnormality  Workstation performed: [id number]               Procedures  Procedures      ED Course  ED Course as of Dec 12 1744   Wed Dec 11, 2019   9443 Paged epileptologist      4729 Discussed with Dr Mehdi Guerrero who recommended patient began Keppra 500 mg b i d  Will give first dose here  He recommended patient also obtain an outpatient video EEG and to follow up outpatient with epileptology  I thoroughly discussed plan with patient and friend  Patient expressed understanding of plan  Return precautions have thoroughly discussed  MDM  Number of Diagnoses or Management Options  [facility]):   Diagnosis management comments: 68-year-old female referred back to the ED from [facility] floor for re-evaluation for seizure activity last night  Patient is sleeping but easily arousable on exam   Patient is mildly tachycardic but otherwise appears well  Patient denies having a headache at this time  No focal neurological deficits on exam   Patient has not seized again since being in the hospital  Discussed risks of CT scan with patient  Will obtain a CT head  Disposition  Final diagnoses:   Seizure [facility])     Time reflects when diagnosis was documented in both MDM as applicable and the Disposition within this note     Time User Action Codes Description Comment    12/11/2019  6:20 AM Jhony Calvin Add [R56 9] Seizure [facility])       ED Disposition     ED Disposition Condition Date/Time Comment    Discharge Stable Wed Dec 11, 2019  6:20 AM Chino Balderas discharge to home/self care  Follow-up Information     Follow up With Specialties Details Why Contact Info    PCP    Please follow up with her primary care physician as soon as possible      Angeli Coronado MD Neurology Schedule an appointment as soon as possible for a visit  Please call today and schedule appointment for ED follow up [address]  [facility]  [phone]            Discharge Medication List as of 12/11/2019  6:34 AM      START taking these medications    Details   levETIRAcetam (KEPPRA) 500 mg tablet Take 1 tablet (500 mg total) by mouth every 12 (twelve) hours, Starting Wed 12/11/2019, Normal         CONTINUE these medications which have NOT CHANGED    Details   diphenhydrAMINE (BENADRYL) 50 mg capsule Take 1 capsule (50 mg total) by mouth daily at bedtime as needed for sleep, Starting Mon 11/4/2019, Normal      docusate sodium (COLACE) 100 mg capsule Take 1 capsule (100 mg total) by mouth 2 (two) times a day, Starting Sat 11/23/2019, Normal      doxylamine (UNISON) 25 MG tablet Take 1 tablet (25 mg total) by mouth daily at bedtime as needed for sleep or nausea, Starting Mon 9/9/2019, Normal      ferrous sulfate 324 (65 Fe) mg Take 1 tablet (324 mg total) by mouth every other day, Starting Fri 11/22/2019, Normal      hydrOXYzine HCL (ATARAX) 10 mg tablet Take 1 tablet (10 mg total) by mouth every 6 (six) hours as needed for anxiety, Starting Sat 11/23/2019, Normal      Prenatal Vit-Fe Fumarate-FA (PRENATAL 1+1 PO) Take by mouth, Historical Med      Pyridoxine HCl (VITAMIN B-6) 25 MG tablet Take 1 tablet (25 mg total) by mouth daily, Starting Mon 9/9/2019, Normal      sertraline (ZOLOFT) 50 mg tablet Take 1 tablet (50 mg total) by mouth daily, Starting Fri 11/22/2019, Normal           Outpatient Discharge Orders   Ambulatory EEG 24 Hours   Standing Status: Standing Number of Occurrences: 3 Standing Exp  Date: 12/11/20       ED Provider  Attending physically available and evaluated [facility]  I managed the patient along with the ED Attending      Electronically Signed by         Elisa Kahn MD  12/12/19 5351

## 2019-12-11 NOTE — ED ATTENDING ATTESTATION
12/11/2019  IFlo MD, saw and evaluated the patient  I have discussed the patient with the resident/non-physician practitioner and agree with the resident's/non-physician practitioner's findings, Plan of Care, and MDM as documented in the resident's/non-physician practitioner's note, except where noted  All available labs and Radiology studies were reviewed  I was present for key portions of any procedure(s) performed by the resident/non-physician practitioner and I was immediately available to provide assistance  At this point I agree with the current assessment done in the Emergency Department  I have conducted an independent evaluation of this patient a history and physical is as follows:    ED Course         Critical Care Time  Procedures    Patient is a pleasant 20year old female who reports a seizure at her group home  She noted an aura, saw colors and then remember waking up  Initially, she was post ictal      Seen by Ob for assessment of the child  Complaining of headache under the care of obgyn  No dysarthria, nystagmus, dysphagia, diplopia, aphasia, vertigo, ataxia, visions loss, dysmetria  Normal finger to nose, gait, rapid alternating movement,  tandem gait, strength and sensation in bilat upper and lower extremities  Normal upper and lower reflexes  No pronator drift  EOMI  CN 2-12 intact  GCS 15  AOx3  Sent back here for a ct of the head  Currently no headache  Given lack of HA, doubt ich, venous sinus thrombosis  Currently completely asymptomatic  MDM well appearing 20 yof, will discuss plan/ dispo with neurology

## 2019-12-12 ENCOUNTER — TELEPHONE (OUTPATIENT)
Dept: OBGYN CLINIC | Facility: CLINIC | Age: 19
End: 2019-12-12

## 2019-12-12 ENCOUNTER — HOSPITAL ENCOUNTER (OUTPATIENT)
Facility: HOSPITAL | Age: 19
Discharge: HOME | End: 2019-12-12
Attending: OBSTETRICS & GYNECOLOGY | Admitting: OBSTETRICS & GYNECOLOGY
Payer: COMMERCIAL

## 2019-12-12 VITALS
HEART RATE: 115 BPM | DIASTOLIC BLOOD PRESSURE: 62 MMHG | SYSTOLIC BLOOD PRESSURE: 101 MMHG | BODY MASS INDEX: 30.37 KG/M2 | WEIGHT: 135 LBS | RESPIRATION RATE: 22 BRPM | TEMPERATURE: 99 F | HEIGHT: 56 IN

## 2019-12-12 LAB
ALBUMIN SERPL BCP-MCNC: 2.9 G/DL (ref 3.5–5)
ALP SERPL-CCNC: 109 U/L (ref 46–116)
ALT SERPL W P-5'-P-CCNC: 24 U/L (ref 12–78)
AMPHETAMINES SERPL QL SCN: NEGATIVE
ANION GAP SERPL CALCULATED.3IONS-SCNC: 7 MMOL/L (ref 4–13)
AST SERPL W P-5'-P-CCNC: 37 U/L (ref 5–45)
BARBITURATES UR QL: NEGATIVE
BASOPHILS # BLD AUTO: 0.02 THOUSANDS/ΜL (ref 0–0.1)
BASOPHILS NFR BLD AUTO: 0 % (ref 0–1)
BENZODIAZ UR QL: NEGATIVE
BILIRUB SERPL-MCNC: 0.33 MG/DL (ref 0.2–1)
BILIRUB UR QL STRIP: NEGATIVE
BUN SERPL-MCNC: 4 MG/DL (ref 5–25)
CALCIUM SERPL-MCNC: 9.1 MG/DL (ref 8.3–10.1)
CHLORIDE SERPL-SCNC: 109 MMOL/L (ref 100–108)
CLARITY UR: CLEAR
CO2 SERPL-SCNC: 23 MMOL/L (ref 21–32)
COCAINE UR QL: NEGATIVE
COLOR UR: YELLOW
CREAT SERPL-MCNC: 0.43 MG/DL (ref 0.6–1.3)
EOSINOPHIL # BLD AUTO: 0.09 THOUSAND/ΜL (ref 0–0.61)
EOSINOPHIL NFR BLD AUTO: 1 % (ref 0–6)
ERYTHROCYTE [DISTWIDTH] IN BLOOD BY AUTOMATED COUNT: 13.2 % (ref 11.6–15.1)
GFR SERPL CREATININE-BSD FRML MDRD: 147 ML/MIN/1.73SQ M
GLUCOSE SERPL-MCNC: 75 MG/DL (ref 65–140)
GLUCOSE UR STRIP-MCNC: NEGATIVE MG/DL
HCT VFR BLD AUTO: 29.8 % (ref 34.8–46.1)
HGB BLD-MCNC: 9.5 G/DL (ref 11.5–15.4)
HGB UR QL STRIP.AUTO: NEGATIVE
IMM GRANULOCYTES # BLD AUTO: 0.05 THOUSAND/UL (ref 0–0.2)
IMM GRANULOCYTES NFR BLD AUTO: 1 % (ref 0–2)
KETONES UR STRIP-MCNC: NEGATIVE MG/DL
LEUKOCYTE ESTERASE UR QL STRIP: NEGATIVE
LYMPHOCYTES # BLD AUTO: 2.19 THOUSANDS/ΜL (ref 0.6–4.47)
LYMPHOCYTES NFR BLD AUTO: 21 % (ref 14–44)
MCH RBC QN AUTO: 26.5 PG (ref 26.8–34.3)
MCHC RBC AUTO-ENTMCNC: 31.9 G/DL (ref 31.4–37.4)
MCV RBC AUTO: 83 FL (ref 82–98)
METHADONE UR QL: NEGATIVE
MONOCYTES # BLD AUTO: 1.16 THOUSAND/ΜL (ref 0.17–1.22)
MONOCYTES NFR BLD AUTO: 11 % (ref 4–12)
NEUTROPHILS # BLD AUTO: 6.97 THOUSANDS/ΜL (ref 1.85–7.62)
NEUTS SEG NFR BLD AUTO: 66 % (ref 43–75)
NITRITE UR QL STRIP: NEGATIVE
NRBC BLD AUTO-RTO: 0 /100 WBCS
OPIATES UR QL SCN: NEGATIVE
PCP UR QL: NEGATIVE
PH UR STRIP.AUTO: 7 [PH]
PLATELET # BLD AUTO: 257 THOUSANDS/UL (ref 149–390)
PMV BLD AUTO: 10.5 FL (ref 8.9–12.7)
POTASSIUM SERPL-SCNC: 3.5 MMOL/L (ref 3.5–5.3)
PROT SERPL-MCNC: 7.5 G/DL (ref 6.4–8.2)
PROT UR STRIP-MCNC: NEGATIVE MG/DL
RBC # BLD AUTO: 3.58 MILLION/UL (ref 3.81–5.12)
SODIUM SERPL-SCNC: 139 MMOL/L (ref 136–145)
SP GR UR STRIP.AUTO: 1.01 (ref 1–1.03)
THC UR QL: NEGATIVE
UROBILINOGEN UR QL STRIP.AUTO: 2 E.U./DL
WBC # BLD AUTO: 10.48 THOUSAND/UL (ref 4.31–10.16)

## 2019-12-12 PROCEDURE — 81003 URINALYSIS AUTO W/O SCOPE: CPT | Performed by: OBSTETRICS & GYNECOLOGY

## 2019-12-12 PROCEDURE — 80307 DRUG TEST PRSMV CHEM ANLYZR: CPT | Performed by: OBSTETRICS & GYNECOLOGY

## 2019-12-12 PROCEDURE — 59025 FETAL NON-STRESS TEST: CPT | Performed by: OBSTETRICS & GYNECOLOGY

## 2019-12-12 PROCEDURE — 99213 OFFICE O/P EST LOW 20 MIN: CPT

## 2019-12-12 PROCEDURE — 80053 COMPREHEN METABOLIC PANEL: CPT | Performed by: OBSTETRICS & GYNECOLOGY

## 2019-12-12 PROCEDURE — 76815 OB US LIMITED FETUS(S): CPT | Performed by: OBSTETRICS & GYNECOLOGY

## 2019-12-12 PROCEDURE — 85025 COMPLETE CBC W/AUTO DIFF WBC: CPT | Performed by: OBSTETRICS & GYNECOLOGY

## 2019-12-12 RX ORDER — HYDROXYZINE HYDROCHLORIDE 10 MG/1
10 TABLET, FILM COATED ORAL EVERY 6 HOURS PRN
Status: DISCONTINUED | OUTPATIENT
Start: 2019-12-12 | End: 2019-12-12 | Stop reason: HOSPADM

## 2019-12-12 RX ADMIN — HYDROXYZINE HYDROCHLORIDE 10 MG: 10 TABLET ORAL at 20:26

## 2019-12-12 NOTE — TELEPHONE ENCOUNTER
Spoke with patient, she unfortunately is unable to come in this Friday with Dr Amie Hoyt  Patient would prefer the [place] location due to using the bus system  Patient is asking if there is anyway to fit her in next week  PCP is correct, has seen PCP within this year

## 2019-12-12 NOTE — TELEPHONE ENCOUNTER
I could add her on at 8 am on Friday 12/20  I am overbooked otherwise already that day, so unless she would be willing to come to [facility] (which is just a little further down [address]), I won't have any sooner availability   If she is willing to come to [facility], I could likely see her earlier next week
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
## 2019-12-12 NOTE — PROCEDURES
Shannon Avelar, a  at [address] [facility] with an MICHAEL of 2020, by Last Menstrual Period, was seen at [address] for the following procedure(s): $Procedure Type: AMELIA, NST]    Nonstress Test  Reason for NST: Decreased Fetal Movement  Variability: Moderate  Decelerations: None  Accelerations: Yes  Acoustic Stimulator: No  Baseline: 145 BPM  Uterine Irritability: No  Contractions: Irregular    4 Quadrant AMELIA  AMELIA Q1 (cm): 2 9 cm  AMELIA Q2 (cm): 3 8 cm  AMELIA Q3 (cm): 4 2 cm  AMELIA Q4 (cm): 2 3 cm  AMELIA TOTAL (cm): 13 2 cm  Vertex presentation            Interpretation  Nonstress Test Interpretation: Reactive  Comments: Reassuring NST, but few fetal breaths noted, no flexion/extension noted, with minimal gross fetal movement

## 2019-12-15 ENCOUNTER — HOSPITAL ENCOUNTER (OUTPATIENT)
Facility: HOSPITAL | Age: 19
Discharge: HOME | End: 2019-12-15
Attending: OBSTETRICS & GYNECOLOGY | Admitting: OBSTETRICS & GYNECOLOGY
Payer: COMMERCIAL

## 2019-12-15 VITALS
BODY MASS INDEX: 30.37 KG/M2 | HEART RATE: 116 BPM | TEMPERATURE: 98.3 F | WEIGHT: 135 LBS | HEIGHT: 56 IN | DIASTOLIC BLOOD PRESSURE: 61 MMHG | SYSTOLIC BLOOD PRESSURE: 106 MMHG | RESPIRATION RATE: 18 BRPM

## 2019-12-15 PROBLEM — Z3A.33 33 WEEKS GESTATION OF PREGNANCY: Status: ACTIVE | Noted: 2019-06-25

## 2019-12-15 PROCEDURE — 76817 TRANSVAGINAL US OBSTETRIC: CPT | Performed by: OBSTETRICS & GYNECOLOGY

## 2019-12-15 PROCEDURE — 99214 OFFICE O/P EST MOD 30 MIN: CPT

## 2019-12-15 NOTE — PROCEDURES
Johnie Rosas, rosi  at 33w2d with an MICHAEL of 2020, by Last Menstrual Period, was seen at [address] [facility] for the following procedure(s): $Procedure Type: US - Transvaginal]                   Ultrasound Other  Fetal Presentation: Vertex  Cervical Length: 3 6  Funnel: No  Debris: No  Placenta Previa: No  Vasa Previa: No      Sherlyn C   Kimbrely Rose MD  OB/GYN  12/15/2019  4:17 PM

## 2019-12-15 NOTE — PROGRESS NOTES
L&D Triage Note - OB/GYN  Blank Poser 20 y o  female MRN: [id number]  Unit/Bed#: LD Triage  Encounter: [id number]      Assessment:  20 y o   at 33w2d not in  labor    Plan:  1  Contractions  - Minimal contractions on tocometry  - No signs or symptoms of infection  - Cervical length wnl  - Reactive NST    Patient for discharge home  Discussed with Dr Dusty Arceo        ______________________________________________________________________      Chief Compliant: contractions    TIME: 1500  Subjective:  20 y o  Adelfo Ortiz at 33w2d who presents with contractions that began in [facility] this morning  She states that the contractions are now every 2 minutes apart  She read by EMS  She denies any loss of fluid or vaginal bleeding  She reports good fetal movement  She denies any abnormal discharge, burning or itching  She states that she has had 2 seizures in the past week  She was evaluated on the 12 status post reported seizure with head injury  She states that she has not been eating and has had trouble drinking due to tongue injury during her seizure  She has an appointment with a neurologist on Friday       Objective:  Vitals:    12/15/19 1400   BP: 106/61   Pulse: (!) 116   Resp: 18   Temp: 98 3 °F (36 8 °C)       SSE:  Normal-appearing vulva, normal-appearing vaginal mucosa, thin white discharge noted in vaginal vault, no odor, cervix closed, no bleeding, no pooling   FHT:  150 / Moderate 6 - 25 bpm / accelerations, no decelerations  [place]: irritability    Wet mount/KOH:  No hyphae, clue cells, trichomonads  Rupture workup: Nitrazine negative, negative Fer[place], no Pooling    TVUS:    Cervical length: 3 6cm   Vertex    No funneling, no dynamic changes      Jaja Aviles MD 12/15/2019 2:59 PM

## 2019-12-15 NOTE — DISCHARGE INSTRUCTIONS
Early Labor Signs   WHAT YOU SHOULD KNOW:   Early labor signs are changes in your body that allow your baby to pass through your birth canal   AFTER YOU LEAVE:   Signs and symptoms of early labor:   · Lightening  occurs when your baby drops inside your pelvis  You may feel increased pressure in your pelvis  This may happen a few weeks to a few hours before your labor begins  · Contractions  are cramps and tightening that occur in your uterus to help move the baby through your birth canal  Contractions occur regularly and more often each time  Each one lasts about 30 to 70 seconds, and gets stronger and more painful until you deliver your baby  Contractions do not go away with movement  They start in your lower back and move to the front in your abdomen  · Effacement  occurs when your cervix softens and thins, so it can easily open for the baby  Your primary healthcare provider [facility] or obstetrician will examine your cervix for effacement  · Dilation  is widening of your cervix, also for the baby's passage  Your PHP or obstetrician will examine your cervix for dilation  Your cervix will be fully opened and ready for delivery when it is dilated to 10 centimeters  · Increased discharge  from your vagina may occur  It may be pink, clear, or slightly bloody  This discharge may also be called bloody show  Bloody show is a mucus plug that forms and blocks your cervix during pregnancy  · Rupture of membranes  is a sudden release of clear fluid from your vagina  It is also known as when your water breaks  Your PHP or obstetrician may need to break your water if it does not break on its own  False labor: You may have false labor signs, which are also called Roberto Carlos Bee contractions  False labor is common and may happen several weeks or days before your actual labor  The contractions are not regular, and do not get closer together  The pain is usually mild, does not worsen, and is felt only in front  Roberto Carlos Bee contractions may happen later in the day, and stop after you change position, walk, or rest   Contact your PHP or obstetrician if:   · You have pain in your lower back or abdomen  · You have bloody mucus or show  · You have questions or concerns about your condition or care  Seek care immediately or call 911 if:   · You have regular, painful contractions that are less than 5 minutes apart, and last 30 to 70 seconds each  · You have heavy vaginal bleeding  · You have a constant trickle or sudden gush of clear fluid from your vagina  · You notice a sudden decrease in your baby's movement  © 2014 [address] [employer] [employer] is for End User's use only and may not be sold, redistributed or otherwise used for commercial purposes  All illustrations and images included in CareNotes® are the copyrighted property of A D A M , Inc  or [employer]  The above information is an  only  It is not intended as medical advice for individual conditions or treatments  Talk to your doctor, nurse or pharmacist before following any medical regimen to see if it is safe and effective for you  Kick Counts in Pregnancy   WHAT YOU NEED TO KNOW:   Kick counts measure how much your baby is moving in your womb  A kick from your baby can be felt as a twist, turn, swish, roll, or jab  It is common to feel your baby kicking at 26 to 28 weeks of pregnancy  You may feel your baby kick as early as 20 weeks of pregnancy  DISCHARGE INSTRUCTIONS:   Seek care immediately if:   · You feel your baby kick less as the day goes on      · You do not feel any kicks in a day  Contact your healthcare provider if:   · You feel a change in the number of kicks or movements of your baby  · You feel fewer than 10 kicks within 2 hours after counting twice  · You have questions or concerns about your baby's movements    Why measure kick counts:  Your baby's movement may provide information about your baby's health  He may move less, or not at all, if there are problems  He may move less if he does not have enough room to grow in your uterus (womb)  He may also move less if he is not getting enough oxygen or nutrition from the placenta  Tell your healthcare provider as soon as you feel a change in your baby's movements  Problems that are found earlier are easier to treat  When to measure kick counts:   · Measure kick counts at the same time every day  · Measure kick counts when your baby is awake and most active  Your baby may be most active in the evening  · Measure kick counts after a meal or snack  Your baby may be more active after you eat  Wait 2 hours after you drink liquids that contain caffeine  Caffeine can make your baby more active than usual     · You should not smoke while you are pregnant  Smoking increases the risk of health problems for you and for your baby during your pregnancy  If you do smoke, wait 2 hours to measure kick counts  Nicotine can make your baby more active than usual   How to measure kick counts:  Check that your baby is awake before you measure kick counts  You can wake up your baby by lightly pushing on your belly, walking, or drinking something cold  Your healthcare provider may tell you different ways to measure kick counts  He may tell you to do the following:  · Use a chart or clock to keep track of the time you start and finish counting  · Sit in a chair or lie on your left side  · Place your hands on the largest part of your belly  · Count until you reach 10 kicks  Write down how much time it takes to count 10 kicks  · It may take 30 minutes to 2 hours to count 10 kicks  It should not take more than 2 hours to count 10 kicks  · If you do not feel 10 kicks within 2 hours, wait 1 hour and count again  Your baby can sleep for up to 40 minutes at one time    Follow up with your healthcare provider as directed:  Write down your questions so you remember to ask them during your visits  © 2017 [address] Information is for End User's use only and may not be sold, redistributed or otherwise used for commercial purposes  All illustrations and images included in CareNotes® are the copyrighted property of A D A M , Inc  or [employer] Daniel[employer]  The above information is an  only  It is not intended as medical advice for individual conditions or treatments  Talk to your doctor, nurse or pharmacist before following any medical regimen to see if it is safe and effective for you

## 2019-12-16 ENCOUNTER — ROUTINE PRENATAL (OUTPATIENT)
Dept: OBGYN CLINIC | Facility: CLINIC | Age: 19
End: 2019-12-16

## 2019-12-16 ENCOUNTER — TELEPHONE (OUTPATIENT)
Dept: NEUROLOGY | Facility: CLINIC | Age: 19
End: 2019-12-16

## 2019-12-16 VITALS
HEART RATE: 120 BPM | DIASTOLIC BLOOD PRESSURE: 68 MMHG | BODY MASS INDEX: 28.57 KG/M2 | SYSTOLIC BLOOD PRESSURE: 100 MMHG | WEIGHT: 127 LBS | HEIGHT: 56 IN

## 2019-12-16 DIAGNOSIS — Z3A.33 33 WEEKS GESTATION OF PREGNANCY: Primary | ICD-10-CM

## 2019-12-16 LAB
SL AMB  POCT GLUCOSE, UA: NEGATIVE
SL AMB POCT URINE PROTEIN: NEGATIVE

## 2019-12-16 PROCEDURE — 99213 OFFICE O/P EST LOW 20 MIN: CPT | Performed by: OBSTETRICS & GYNECOLOGY

## 2019-12-16 PROCEDURE — 81002 URINALYSIS NONAUTO W/O SCOPE: CPT | Performed by: OBSTETRICS & GYNECOLOGY

## 2019-12-16 NOTE — PROGRESS NOTES
OB/GYN prenatal visit    S: 20 y o  Patt Muñoz 33w3d here for PN visit  She has denies obstetric complaints, including pelvic pain, contractions, vaginal bleeding, loss of fluid, or decreased fetal movement  She denies any recent seizure activity, scheduled an appointment with Neurology - on Keppra 500mg BID  She denies any headaches, visual changes/scotoma, RUQ/Epigastric pain, new onset swelling/fatigue      O:     /68 (BP Location: Right arm, Patient Position: Sitting, Cuff Size: Adult)   Pulse  100   Ht 4' 8" (1 422 m)   Wt 57 6 kg (127 lb)   LMP 2019   BMI 28 47 kg/m²     Physical Examination:   General appearance - alert, well appearing, and in no distress and oriented to person, place, and time  Chest - clear to auscultation, no wheezes, rales or rhonchi, symmetric air entry  Heart - normal rate, regular rhythm, normal S1, S2, no murmurs, rubs, clicks or gallops  Abdomen - soft, nontender, nondistended, gravid uterus FH 32cm  Extremities - peripheral pulses normal, no pedal edema, no clubbing or cyanosis  Skin - normal coloration and turgor, no rashes, no suspicious skin lesions noted    FHR: 145bpm      A/P:    Problem List Items Addressed This Visit        Other    33 weeks gestation of pregnancy - Primary    Relevant Orders    POCT urine dip (Completed)        Neurology appt scheduled for this Friday (19)  RTC in 2 weeks for GBS, denies PCN allergy    Granville Apgar, MD  2019  11:17 AM

## 2019-12-16 NOTE — TELEPHONE ENCOUNTER
[facility]    12/16/19 PCP LISTED FAXED REQ BACK-INCOMPLETED-THEY HAVE NOT SEEN PT     12/18/19 SPOKE W/ PT  Mitch Herrera STATES SHE HAS NOT SEEN PCP LISTED THROUGH INS  SHE STATES SHE WILL CALL THE [facility] REGARDING PCP AND WILL ESTABLISH W/PCP IN ORDER TO OBTAIN PHYS ORD   THEN WILL CALL ME BACK TO UPDATE ON PROGRESS OF PHYS ORDER

## 2019-12-17 NOTE — ED ATTENDING ATTESTATION
12/10/2019  I, Sindy Trinh MD, saw and evaluated the patient  I have discussed the patient with the resident and agree with the resident's findings, Plan of Care, and MDM as documented in the resident's note, unless otherwise documented below  All available labs and Radiology studies were reviewed by myself  I was present for key portions of any procedure(s) performed by the resident and I was immediately available to provide assistance  I agree with the current assessment done in the Emergency Department  I have conducted an independent evaluation of this patient  Briefly, this is a 20 y o  female  at 28w1d, with history of seizure, anxiety, no longer on zoloft or atarax and not on any antiepileptics presenting with seizure  Patient is currently at a homeless shelter  Patient had a seizure, unwitnessed, was found confused, post-ictal  She arrives via EMS  No further seizure activity  On arrival, complaints of mild left sided abdominal cramping and of her tongue hurting  Reports seizures since she was a child, but is not on any meds  Reports doing well up until several days ago, when she had a seizure and was admitted at [facility]  No recent illnesses, denies headache, changes in vision, double vision, RUQ abdominal pain, nausea, vomiting, decreased appetite  Feels the baby kick  No loss of fluid or vaginal bleeding  ROS: no fevers or chills, no headache, no neck pain, no chest pain, no shortness of breath, mild left sided crampy abdominal pain without nausea or vomiting  No dysuria, no vaginal bleeding, no loss of fluid  No weakness or numbness  Reports doing ok from depression standpoint, does not feel like she needs to be back on her zoloft  Now has seizures  Rest of ten systems reviewed and neg unless otherwise noted      PMH: seizures, depression, anxiety    Physical Exam  Vitals:    12/10/19 2330 19 0011 19 0100 19 0226   BP: 103/55  102/58 94/53   TempSrc:  Oral     Pulse: 104 (!) 109 100 (!) 108   Resp: 20 20     Patient Position - Orthostatic VS: Lying      Temp:  98 °F (36 7 °C)         Constitutional:  Awake, alert, oriented  No acute distress  HEENT:  Normocephalic, atraumatic  Sclera anicteric, conjunctiva not injected  Bite marks to lateral aspect of tongue bilaterally, hemostatic  Moist oral mucosa  Cardiac:  Tachycardic, regular rhythm, no murmurs, rubs, or gallops  2+ radial pulses  Respiratory:  Lungs are clear to auscultation bilaterally, no wheezes or rales  Abdomen:  Nondistended  Bowel sounds present  No tenderness to palpation  Uterine fundus between umbilicus and xiphoid process, non-tender  No rebound or guarding  Extremities:  No deformities, no edema  No ecchymoses  Integument:  No rashes or exposed areas, cap refill less than 2 seconds  Neurologic:  Awake, alert, and oriented x3  Face symmetric, 5/5 strength in BUE and BLE  Nonfocal exam   Psychiatric:  Normal affect, pleasant    Labs Reviewed   COMPREHENSIVE METABOLIC PANEL - Abnormal       Result Value Ref Range Status    Sodium 140  136 - 145 mmol/L Final    Potassium 3 4 (*) 3 5 - 5 3 mmol/L Final    Chloride 109 (*) 100 - 108 mmol/L Final    CO2 23  21 - 32 mmol/L Final    ANION GAP 8  4 - 13 mmol/L Final    BUN 5  5 - 25 mg/dL Final    Creatinine 0 45 (*) 0 60 - 1 30 mg/dL Final    Comment: Standardized to IDMS reference method    Glucose 98  65 - 140 mg/dL Final    Comment:   If the patient is fasting, the ADA then defines impaired fasting glucose as > 100 mg/dL and diabetes as > or equal to 123 mg/dL  Specimen collection should occur prior to Sulfasalazine administration due to the potential for falsely depressed results  Specimen collection should occur prior to Sulfapyridine administration due to the potential for falsely elevated results      Calcium 8 5  8 3 - 10 1 mg/dL Final    AST 17  5 - 45 U/L Final    Comment:   Specimen collection should occur prior to Sulfasalazine administration due to the potential for falsely depressed results  ALT 17  12 - 78 U/L Final    Comment:   Specimen collection should occur prior to Sulfasalazine and/or Sulfapyridine administration due to the potential for falsely depressed results       Alkaline Phosphatase 94  46 - 116 U/L Final    Total Protein 6 5  6 4 - 8 2 g/dL Final    Albumin 2 7 (*) 3 5 - 5 0 g/dL Final    Total Bilirubin 0 13 (*) 0 20 - 1 00 mg/dL Final    eGFR 145  ml/min/1 73sq m Final    Narrative:     [place] guidelines for Chronic Kidney Disease (CKD):     Stage 1 with normal or high GFR (GFR > 90 mL/min/1 73 square meters)    Stage 2 Mild CKD (GFR = 60-89 mL/min/1 73 square meters)    Stage 3A Moderate CKD (GFR = 45-59 mL/min/1 73 square meters)    Stage 3B Moderate CKD (GFR = 30-44 mL/min/1 73 square meters)    Stage 4 Severe CKD (GFR = 15-29 mL/min/1 73 square meters)    Stage 5 End Stage CKD (GFR <15 mL/min/1 73 square meters)  Note: GFR calculation is accurate only with a steady state creatinine   CBC AND DIFFERENTIAL - Abnormal    WBC 12 49 (*) 4 31 - 10 16 Thousand/uL Final    RBC 3 01 (*) 3 81 - 5 12 Million/uL Final    Hemoglobin 8 3 (*) 11 5 - 15 4 g/dL Final    Hematocrit 25 2 (*) 34 8 - 46 1 % Final    MCV 84  82 - 98 fL Final    MCH 27 6  26 8 - 34 3 pg Final    MCHC 32 9  31 4 - 37 4 g/dL Final    RDW 13 2  11 6 - 15 1 % Final    MPV 10 2  8 9 - 12 7 fL Final    Platelets 124  062 - 390 Thousands/uL Final    nRBC 0  /100 WBCs Final    Neutrophils Relative 63  43 - 75 % Final    Immat GRANS % 1  0 - 2 % Final    Lymphocytes Relative 23  14 - 44 % Final    Monocytes Relative 11  4 - 12 % Final    Eosinophils Relative 2  0 - 6 % Final    Basophils Relative 0  0 - 1 % Final    Neutrophils Absolute 8 01 (*) 1 85 - 7 62 Thousands/µL Final    Immature Grans Absolute 0 11  0 00 - 0 20 Thousand/uL Final    Lymphocytes Absolute 2 85  0 60 - 4 47 Thousands/µL Final    Monocytes Absolute 1 31 (*) 0 17 - 1 22 Thousand/µL Final    Eosinophils Absolute 0 19  0 00 - 0 61 Thousand/µL Final    Basophils Absolute 0 02  0 00 - 0 10 Thousands/µL Final   PROTIME-INR - Normal    Protime 13 0  11 6 - 14 5 seconds Final    INR 1 02  0 84 - 1 19 Final   APTT - Normal    PTT 24  23 - 37 seconds Final    Comment: Therapeutic Heparin Range =  60-90 seconds   POCT URINALYSIS DIPSTICK - Normal    Protein, UA (Ref: Negative) 100   Final   TYPE AND SCREEN    ABO Grouping O   Final    Rh Factor Positive   Final    Antibody Screen Negative   Final    Specimen Expiration Date [id number]   Final       ED Course  20 y o  female in third trimester pregnancy presents with seizure, now A&O and no longer post-ictal  DDx includes pre-eclampsia, HELLP, uncontrolled seizure disorder, electrolyte abnormality, brain mass/bleeding, versus another etiology  It appears that patient has a history of seizure disorder and is not on antiepileptics  She is not hypertensive  We will obtain basic labs and rule out HELLP and transfer patient to L&D for evaluation and admission  Labs are as above and without transaminitis, thrombocytopenia, but with mild anemia with Hgb 8 3  Bedside ultrasound is with live norris pregnancy with   Patient had CT head in Jul 2019 which revealed no masses, patient has no outward signs of head trauma at present and denies headache, we will defer CT head for now  Patient transferred to L&D for further evaluation      Clinical Impression  Final diagnoses:   Seizure [facility])   Pregnant and not yet delivered in third trimester   Abdominal cramping   Abrasion of tongue, initial encounter

## 2019-12-19 ENCOUNTER — APPOINTMENT (EMERGENCY)
Dept: RADIOLOGY | Facility: HOSPITAL | Age: 19
DRG: 566 | End: 2019-12-19
Payer: COMMERCIAL

## 2019-12-19 ENCOUNTER — HOSPITAL ENCOUNTER (INPATIENT)
Facility: HOSPITAL | Age: 19
LOS: 2 days | Discharge: HOME | DRG: 566 | End: 2019-12-21
Attending: EMERGENCY MEDICINE | Admitting: INTERNAL MEDICINE
Payer: COMMERCIAL

## 2019-12-19 DIAGNOSIS — O99.013 ANEMIA DURING PREGNANCY IN THIRD TRIMESTER: ICD-10-CM

## 2019-12-19 DIAGNOSIS — R56.9 SEIZURE (HCC): Primary | ICD-10-CM

## 2019-12-19 DIAGNOSIS — Z3A.33 33 WEEKS GESTATION OF PREGNANCY: ICD-10-CM

## 2019-12-19 LAB
ABO GROUP BLD: NORMAL
ALBUMIN SERPL BCP-MCNC: 2.7 G/DL (ref 3.5–5)
ALP SERPL-CCNC: 121 U/L (ref 46–116)
ALT SERPL W P-5'-P-CCNC: 17 U/L (ref 12–78)
AMPHETAMINES SERPL QL SCN: NEGATIVE
ANION GAP SERPL CALCULATED.3IONS-SCNC: 6 MMOL/L (ref 4–13)
AST SERPL W P-5'-P-CCNC: 20 U/L (ref 5–45)
BACTERIA UR QL AUTO: ABNORMAL /HPF
BARBITURATES UR QL: NEGATIVE
BASOPHILS # BLD AUTO: 0.03 THOUSANDS/ΜL (ref 0–0.1)
BASOPHILS NFR BLD AUTO: 0 % (ref 0–1)
BENZODIAZ UR QL: NEGATIVE
BILIRUB SERPL-MCNC: 0.23 MG/DL (ref 0.2–1)
BILIRUB UR QL STRIP: NEGATIVE
BLD GP AB SCN SERPL QL: NEGATIVE
BUN SERPL-MCNC: 4 MG/DL (ref 5–25)
CALCIUM SERPL-MCNC: 9 MG/DL (ref 8.3–10.1)
CHLORIDE SERPL-SCNC: 109 MMOL/L (ref 100–108)
CLARITY UR: ABNORMAL
CO2 SERPL-SCNC: 24 MMOL/L (ref 21–32)
COCAINE UR QL: NEGATIVE
COLOR UR: YELLOW
CREAT SERPL-MCNC: 0.46 MG/DL (ref 0.6–1.3)
CREAT UR-MCNC: 119 MG/DL
EOSINOPHIL # BLD AUTO: 0.06 THOUSAND/ΜL (ref 0–0.61)
EOSINOPHIL NFR BLD AUTO: 1 % (ref 0–6)
ERYTHROCYTE [DISTWIDTH] IN BLOOD BY AUTOMATED COUNT: 13.2 % (ref 11.6–15.1)
GFR SERPL CREATININE-BSD FRML MDRD: 144 ML/MIN/1.73SQ M
GLUCOSE SERPL-MCNC: 88 MG/DL (ref 65–140)
GLUCOSE UR STRIP-MCNC: NEGATIVE MG/DL
HCT VFR BLD AUTO: 28.3 % (ref 34.8–46.1)
HGB BLD-MCNC: 9 G/DL (ref 11.5–15.4)
HGB UR QL STRIP.AUTO: NEGATIVE
IMM GRANULOCYTES # BLD AUTO: 0.11 THOUSAND/UL (ref 0–0.2)
IMM GRANULOCYTES NFR BLD AUTO: 1 % (ref 0–2)
INR PPP: 1.02 (ref 0.84–1.19)
KETONES UR STRIP-MCNC: NEGATIVE MG/DL
LEUKOCYTE ESTERASE UR QL STRIP: NEGATIVE
LYMPHOCYTES # BLD AUTO: 2.1 THOUSANDS/ΜL (ref 0.6–4.47)
LYMPHOCYTES NFR BLD AUTO: 17 % (ref 14–44)
MCH RBC QN AUTO: 26.3 PG (ref 26.8–34.3)
MCHC RBC AUTO-ENTMCNC: 31.8 G/DL (ref 31.4–37.4)
MCV RBC AUTO: 83 FL (ref 82–98)
METHADONE UR QL: NEGATIVE
MONOCYTES # BLD AUTO: 1.32 THOUSAND/ΜL (ref 0.17–1.22)
MONOCYTES NFR BLD AUTO: 11 % (ref 4–12)
MUCOUS THREADS UR QL AUTO: ABNORMAL
NEUTROPHILS # BLD AUTO: 8.66 THOUSANDS/ΜL (ref 1.85–7.62)
NEUTS SEG NFR BLD AUTO: 70 % (ref 43–75)
NITRITE UR QL STRIP: NEGATIVE
NON-SQ EPI CELLS URNS QL MICRO: ABNORMAL /HPF
NRBC BLD AUTO-RTO: 0 /100 WBCS
OPIATES UR QL SCN: NEGATIVE
PCP UR QL: NEGATIVE
PH UR STRIP.AUTO: 7 [PH]
PLATELET # BLD AUTO: 284 THOUSANDS/UL (ref 149–390)
PMV BLD AUTO: 10.1 FL (ref 8.9–12.7)
POTASSIUM SERPL-SCNC: 4 MMOL/L (ref 3.5–5.3)
PROT SERPL-MCNC: 7.3 G/DL (ref 6.4–8.2)
PROT UR STRIP-MCNC: ABNORMAL MG/DL
PROT UR-MCNC: 65 MG/DL
PROT/CREAT UR: 0.55 MG/G{CREAT} (ref 0–0.1)
PROTHROMBIN TIME: 13 SECONDS (ref 11.6–14.5)
RBC # BLD AUTO: 3.42 MILLION/UL (ref 3.81–5.12)
RBC #/AREA URNS AUTO: ABNORMAL /HPF
RH BLD: POSITIVE
SODIUM SERPL-SCNC: 139 MMOL/L (ref 136–145)
SP GR UR STRIP.AUTO: 1.02 (ref 1–1.03)
SPECIMEN EXPIRATION DATE: NORMAL
THC UR QL: NEGATIVE
UROBILINOGEN UR QL STRIP.AUTO: 1 E.U./DL
WBC # BLD AUTO: 12.28 THOUSAND/UL (ref 4.31–10.16)
WBC #/AREA URNS AUTO: ABNORMAL /HPF

## 2019-12-19 PROCEDURE — 99285 EMERGENCY DEPT VISIT HI MDM: CPT | Performed by: EMERGENCY MEDICINE

## 2019-12-19 PROCEDURE — 70450 CT HEAD/BRAIN W/O DYE: CPT

## 2019-12-19 PROCEDURE — 80053 COMPREHEN METABOLIC PANEL: CPT | Performed by: OBSTETRICS & GYNECOLOGY

## 2019-12-19 PROCEDURE — 81001 URINALYSIS AUTO W/SCOPE: CPT | Performed by: OBSTETRICS & GYNECOLOGY

## 2019-12-19 PROCEDURE — 86900 BLOOD TYPING SEROLOGIC ABO: CPT | Performed by: OBSTETRICS & GYNECOLOGY

## 2019-12-19 PROCEDURE — 99285 EMERGENCY DEPT VISIT HI MDM: CPT

## 2019-12-19 PROCEDURE — 83735 ASSAY OF MAGNESIUM: CPT | Performed by: INTERNAL MEDICINE

## 2019-12-19 PROCEDURE — 82570 ASSAY OF URINE CREATININE: CPT | Performed by: OBSTETRICS & GYNECOLOGY

## 2019-12-19 PROCEDURE — 85025 COMPLETE CBC W/AUTO DIFF WBC: CPT | Performed by: OBSTETRICS & GYNECOLOGY

## 2019-12-19 PROCEDURE — 80177 DRUG SCRN QUAN LEVETIRACETAM: CPT | Performed by: OBSTETRICS & GYNECOLOGY

## 2019-12-19 PROCEDURE — 86850 RBC ANTIBODY SCREEN: CPT | Performed by: OBSTETRICS & GYNECOLOGY

## 2019-12-19 PROCEDURE — 96365 THER/PROPH/DIAG IV INF INIT: CPT

## 2019-12-19 PROCEDURE — 36415 COLL VENOUS BLD VENIPUNCTURE: CPT | Performed by: OBSTETRICS & GYNECOLOGY

## 2019-12-19 PROCEDURE — 86901 BLOOD TYPING SEROLOGIC RH(D): CPT | Performed by: OBSTETRICS & GYNECOLOGY

## 2019-12-19 PROCEDURE — 80307 DRUG TEST PRSMV CHEM ANLYZR: CPT | Performed by: OBSTETRICS & GYNECOLOGY

## 2019-12-19 PROCEDURE — 85610 PROTHROMBIN TIME: CPT | Performed by: INTERNAL MEDICINE

## 2019-12-19 PROCEDURE — 84156 ASSAY OF PROTEIN URINE: CPT | Performed by: OBSTETRICS & GYNECOLOGY

## 2019-12-19 PROCEDURE — 87086 URINE CULTURE/COLONY COUNT: CPT | Performed by: OBSTETRICS & GYNECOLOGY

## 2019-12-19 PROCEDURE — 84100 ASSAY OF PHOSPHORUS: CPT | Performed by: INTERNAL MEDICINE

## 2019-12-19 RX ORDER — MAGNESIUM SULFATE HEPTAHYDRATE 40 MG/ML
2 INJECTION, SOLUTION INTRAVENOUS ONCE
Status: COMPLETED | OUTPATIENT
Start: 2019-12-19 | End: 2019-12-19

## 2019-12-19 RX ORDER — SODIUM CHLORIDE 9 MG/ML
75 INJECTION, SOLUTION INTRAVENOUS CONTINUOUS
Status: DISCONTINUED | OUTPATIENT
Start: 2019-12-19 | End: 2019-12-20

## 2019-12-19 RX ORDER — MAGNESIUM SULFATE HEPTAHYDRATE 40 MG/ML
4 INJECTION, SOLUTION INTRAVENOUS ONCE
Status: COMPLETED | OUTPATIENT
Start: 2019-12-19 | End: 2019-12-19

## 2019-12-19 RX ORDER — LORAZEPAM 2 MG/ML
2 INJECTION INTRAMUSCULAR EVERY 6 HOURS PRN
Status: DISCONTINUED | OUTPATIENT
Start: 2019-12-19 | End: 2019-12-21 | Stop reason: HOSPADM

## 2019-12-19 RX ADMIN — SODIUM CHLORIDE 75 ML/HR: 0.9 INJECTION, SOLUTION INTRAVENOUS at 23:38

## 2019-12-19 RX ADMIN — MAGNESIUM SULFATE HEPTAHYDRATE 4 G: 40 INJECTION, SOLUTION INTRAVENOUS at 20:09

## 2019-12-19 RX ADMIN — MAGNESIUM SULFATE HEPTAHYDRATE 2 G: 40 INJECTION, SOLUTION INTRAVENOUS at 20:32

## 2019-12-19 NOTE — ED NOTES
Called up to OB to bring pt upstairs due to fundus pain   OB coming down to evaluate patient instead     Katie Cerda RN  12/19/19 [id number]

## 2019-12-19 NOTE — ED ATTENDING ATTESTATION
12/19/2019  Chad Guido DO, saw and evaluated the patient  I have discussed the patient with the resident/non-physician practitioner and agree with the resident's/non-physician practitioner's findings, Plan of Care, and MDM as documented in the resident's/non-physician practitioner's note, except where noted  All available labs and Radiology studies were reviewed  I was present for key portions of any procedure(s) performed by the resident/non-physician practitioner and I was immediately available to provide assistance  At this point I agree with the current assessment done in the Emergency Department  I have conducted an independent evaluation of this patient a history and physical is as follows:    20 yof 34 weeks gestation with seizure  No vaginal dc, no abd pain, +fetal movement  Past Medical History:   Diagnosis Date    Seizure ([facility] [address] )     Varicella      /66 (BP Location: Left arm)   Pulse (!) 129   Temp 98 °F (36 7 °C) (Oral)   Resp 20   Wt 57 6 kg (127 lb)   LMP 04/26/2019   SpO2 99%   BMI 28 47 kg/m²   NAD, post ictal, CTA, RRR, abd soft/NT/gravid, ext w/o edema    Pt seen by OB who is familiar with her and recommends magnesium for unlikely preeclampsia out of precaution, UA, medical admission w neuro consult  FHR 140s             ED Course         Critical Care Time  Procedures

## 2019-12-20 ENCOUNTER — TELEPHONE (OUTPATIENT)
Dept: NEUROLOGY | Facility: CLINIC | Age: 19
End: 2019-12-20

## 2019-12-20 VITALS
HEIGHT: 56 IN | TEMPERATURE: 98.3 F | OXYGEN SATURATION: 95 % | DIASTOLIC BLOOD PRESSURE: 66 MMHG | HEART RATE: 105 BPM | SYSTOLIC BLOOD PRESSURE: 114 MMHG | WEIGHT: 127 LBS | RESPIRATION RATE: 16 BRPM | BODY MASS INDEX: 28.57 KG/M2

## 2019-12-20 LAB
ANION GAP SERPL CALCULATED.3IONS-SCNC: 7 MMOL/L (ref 4–13)
BACTERIA UR CULT: NORMAL
BASOPHILS # BLD AUTO: 0.03 THOUSANDS/ΜL (ref 0–0.1)
BASOPHILS NFR BLD AUTO: 0 % (ref 0–1)
BUN SERPL-MCNC: 2 MG/DL (ref 5–25)
CALCIUM SERPL-MCNC: 8.6 MG/DL (ref 8.3–10.1)
CHLORIDE SERPL-SCNC: 109 MMOL/L (ref 100–108)
CO2 SERPL-SCNC: 22 MMOL/L (ref 21–32)
CREAT SERPL-MCNC: 0.4 MG/DL (ref 0.6–1.3)
EOSINOPHIL # BLD AUTO: 0.06 THOUSAND/ΜL (ref 0–0.61)
EOSINOPHIL NFR BLD AUTO: 1 % (ref 0–6)
ERYTHROCYTE [DISTWIDTH] IN BLOOD BY AUTOMATED COUNT: 13.2 % (ref 11.6–15.1)
GFR SERPL CREATININE-BSD FRML MDRD: 150 ML/MIN/1.73SQ M
GLUCOSE SERPL-MCNC: 78 MG/DL (ref 65–140)
HCT VFR BLD AUTO: 26.1 % (ref 34.8–46.1)
HGB BLD-MCNC: 8.1 G/DL (ref 11.5–15.4)
IMM GRANULOCYTES # BLD AUTO: 0.08 THOUSAND/UL (ref 0–0.2)
IMM GRANULOCYTES NFR BLD AUTO: 1 % (ref 0–2)
LYMPHOCYTES # BLD AUTO: 2.35 THOUSANDS/ΜL (ref 0.6–4.47)
LYMPHOCYTES NFR BLD AUTO: 22 % (ref 14–44)
MAGNESIUM SERPL-MCNC: 1.8 MG/DL (ref 1.6–2.6)
MCH RBC QN AUTO: 26.3 PG (ref 26.8–34.3)
MCHC RBC AUTO-ENTMCNC: 31 G/DL (ref 31.4–37.4)
MCV RBC AUTO: 85 FL (ref 82–98)
MONOCYTES # BLD AUTO: 1.08 THOUSAND/ΜL (ref 0.17–1.22)
MONOCYTES NFR BLD AUTO: 10 % (ref 4–12)
NEUTROPHILS # BLD AUTO: 7.19 THOUSANDS/ΜL (ref 1.85–7.62)
NEUTS SEG NFR BLD AUTO: 66 % (ref 43–75)
NRBC BLD AUTO-RTO: 0 /100 WBCS
PHOSPHATE SERPL-MCNC: 2.3 MG/DL (ref 2.7–4.5)
PLATELET # BLD AUTO: 253 THOUSANDS/UL (ref 149–390)
PMV BLD AUTO: 10.3 FL (ref 8.9–12.7)
POTASSIUM SERPL-SCNC: 3.6 MMOL/L (ref 3.5–5.3)
RBC # BLD AUTO: 3.08 MILLION/UL (ref 3.81–5.12)
SODIUM SERPL-SCNC: 138 MMOL/L (ref 136–145)
WBC # BLD AUTO: 10.79 THOUSAND/UL (ref 4.31–10.16)

## 2019-12-20 PROCEDURE — 85025 COMPLETE CBC W/AUTO DIFF WBC: CPT | Performed by: INTERNAL MEDICINE

## 2019-12-20 PROCEDURE — 80048 BASIC METABOLIC PNL TOTAL CA: CPT | Performed by: INTERNAL MEDICINE

## 2019-12-20 PROCEDURE — 99253 IP/OBS CNSLTJ NEW/EST LOW 45: CPT | Performed by: PSYCHIATRY & NEUROLOGY

## 2019-12-20 RX ORDER — FERROUS SULFATE 325(65) MG
325 TABLET ORAL
Status: DISCONTINUED | OUTPATIENT
Start: 2019-12-20 | End: 2019-12-21 | Stop reason: HOSPADM

## 2019-12-20 RX ADMIN — LEVETIRACETAM 500 MG: 100 INJECTION, SOLUTION INTRAVENOUS at 00:29

## 2019-12-20 RX ADMIN — LEVETIRACETAM 500 MG: 100 INJECTION, SOLUTION INTRAVENOUS at 08:42

## 2019-12-20 RX ADMIN — IRON SUCROSE 200 MG: 20 INJECTION, SOLUTION INTRAVENOUS at 17:55

## 2019-12-20 RX ADMIN — LEVETIRACETAM 1000 MG: 100 INJECTION, SOLUTION INTRAVENOUS at 21:03

## 2019-12-20 NOTE — TELEPHONE ENCOUNTER
Noted  I am not seeing any openings at [facility] in the next 3-4 weeks, so if something opens up with either Dr Jacqueline Tan or Dr Janice Nelson, let's put it on hold for this patient

## 2019-12-20 NOTE — TELEPHONE ENCOUNTER
Patient was scheduled to see Dr Otto Houser today 12/20  As of last night 12 19 patient was admitted to the ER and is currently in the hospital  Patient has not yet seen Neuro  Per Dr Otto Houser he would like us to keep an eye on this patient as to when they are discharged  Patient can be scheduled with any epileptologist since patient has not yet seen outpatient Neuro

## 2019-12-20 NOTE — DISCHARGE INSTRUCTIONS
Your Keppra dosage was increased  Please have sleep deprived EEG performed per neurologist's  recommendation  Epilepsy and Pregnancy   WHAT YOU NEED TO KNOW:   Women with epilepsy can have a safe and healthy pregnancy with careful planning  Epilepsy and epilepsy medicine may make it more difficult to become pregnant  Both may also make it difficult to manage your pregnancy  Pregnancy may increase or decrease the amount of seizures that you have  Work with your healthcare provider to decrease risks to you and your baby  DISCHARGE INSTRUCTIONS:   Call 911 or have someone else call for any of the following:   · You have a seizure during pregnancy  · Your seizure lasts longer than 5 minutes  · You have trouble breathing, or you stop breathing  Return to the emergency department if:   · You have a second seizure that happens within 24 hours of your first      · You are injured during a seizure  · After a seizure, you are confused longer than you usually are  · You have vaginal bleeding or contractions  · You cannot feel your baby move  Contact your healthcare provider if:   · You have trouble sleeping or managing stress  · You have nausea or are vomiting and cannot take your medicine  · Your seizures start to happen more often  · You become depressed or have changes in your mood  · You have questions or concerns about your condition or care  Self-care during pregnancy:   · Take your epilepsy medicine every day at the same time  Do not skip a dose  Correct use of your medicine will lower your risk for a seizure during pregnancy  · Take folic acid and prenatal vitamins as directed  Epilepsy medicine may decrease the amount of folic acid in your body  Folic acid is important because it may decrease your baby's risks for birth defects  You will need to take folic acid before you get pregnant  You will need to continue for the first 3 months of your pregnancy   You may need a larger dose in the first 3 months of pregnancy  · Keep all appointments for blood tests  Blood tests will help your healthcare provider decide what dose of epilepsy medicine you need  The right dose will prevent seizures and birth defects  Your healthcare provider may need to change the dose of your medicine often during your pregnancy  · Get prenatal screening as directed  Regular ultrasounds will help monitor your baby  You may need other tests to check for problems with your baby's heart or nervous system  These problems can be caused by epilepsy or epilepsy medicine  These tests will help your healthcare provider create safe labor, delivery, and care plans  · Do not drink alcohol or use drugs  This may harm your baby and increase your risk for seizures  · Get plenty of rest   A lack of sleep may increase your risk for a seizure  Try to go to sleep and wake up at the same times every day  Take naps throughout the day if you feel tired  Ask family or friends for help with other small children or chores  Talk to your healthcare provider if you have trouble sleeping  · Manage your stress during pregnancy  Too much stress can trigger a seizure  Meditate, do prenatal yoga, or do any activity that helps you relax  Talk to your healthcare provider if you need help to manage stress  Breastfeeding:  Ask your healthcare provider if it safe to breastfeed your baby  Some epilepsy medicines are released from the body through breast milk  Your baby may be exposed to epilepsy medicine  The medicine may make your baby more sleepy than normal  Your healthcare provider may change your dose while you are breastfeeding  Breast milk may prevent infections or learning problems in your baby  These benefits may outweigh the risks of breastfeeding your baby while you take epilepsy medicine  Follow up with your neurologist and obstetrician as directed:  Keep all appointments for prenatal visits   Write down your questions so you remember to ask them during your visits  © 2017 [facility] Information is for End User's use only and may not be sold, redistributed or otherwise used for commercial purposes  All illustrations and images included in CareNotes® are the copyrighted property of A D A M , Inc  or [employer]  The above information is an  only  It is not intended as medical advice for individual conditions or treatments  Talk to your doctor, nurse or pharmacist before following any medical regimen to see if it is safe and effective for you

## 2019-12-20 NOTE — CONSULTS
Consultation - Obstetrics   Sunita Madrigal 20 y o  female MRN: [id number]  Unit/Bed#: ED 07 Encounter: [id number]    Assessment/Plan     Assessment:    20year old G1 with a known history of epilepsy s/p witnessed grand mal seizure  IUP at 33 weeks gestation  Rh positive  GBS negative    Plan:    1  Witnessed grand mal seizure in the setting of known epilepsy  -likely secondary to her underlying epilepsy which has been exacerbated throughout the pregnancy  --patient has been started on Keppra 500mg BID on 19 and is likely subtherapeutic  --would recommend obtaining a Keppra level and consultation by neurology to assist with her medication regimen  -will give 6g Magnesium as a preventative measure to protect patient in the event that this seizure is secondary to eclampsia, however, this is very unlikely-BP are normotensive  -would recommend CT scan to rule out other underlying causes of new onset seizures as well at this time, it is safe in pregnancy  -follow up labs (CBC, CMP, protein:creatinine ratio)  -follow up urine drug screen  2  IUP at 33 weeks and 6 days  -patient denying any complaints related to the pregnancy  -will provide 4 hours of CEFM in the setting of recent trauma secondary to grand mal seizure activity  -will perform NST TID daily in the event she requires admission      History of Present Illness   Chief Complaint: Seizure activity    Subspeciality: General OB    HPI:  Sunita Madrigal is a 20 y o   female with an MICHAEL of 2020, by Last Menstrual Period at 33w6d weeks gestation who is being admitted for witnessed seizure activity in the setting of known epislepsy  Patient states that she has had epilepsy her whole life, but had not had a seizure since she was 14years old  At the start of the pregnancy she was on no antiepileptic medication and over the past 2 months has began to have a recurrence of seizure activity  She was started on Keppra 500 mg b i d   On the  of this month and was set to follow up with Neurology on the  of this month  However she was witnessed this evening to have had a grand mal seizure around 18:00  She states she has been taking her medication as directed  All she recalls is sitting down to eat before the seizure started  She reported some head pain some tongue pain has she was with stating her head and biting her tongue  She denies chest pain but reports a sensation that is difficult to catch her breath  She denies obstetric complaints  Her current obstetrical history is significant for Epilepsy  Contractions: None  Leakage of fluid: None  Bleeding: None  Fetal movement: present  Pregnancy complications: as listed above  Consults    Review of Systems   Constitutional: Negative for chills and fever  HENT: Negative for trouble swallowing  Eyes: Negative for visual disturbance  Respiratory: Positive for shortness of breath  Negative for chest tightness  Cardiovascular: Negative for chest pain  Gastrointestinal: Negative for abdominal pain, nausea and vomiting  Genitourinary: Negative for vaginal bleeding  Neurological: Positive for seizures and headaches  Negative for dizziness  Psychiatric/Behavioral: Positive for confusion  Historical Information   OB History    Para Term  AB Living   1 0 0 0 0 0   SAB TAB Ectopic Multiple Live Births   0 0 0 0 0      # Outcome Date GA Lbr Antonio/2nd Weight Sex Delivery Anes PTL Lv   1 Current              Baby complications/comments: none  Past Medical History:   Diagnosis Date    Seizure ([facility] [address] )     Varicella      History reviewed  No pertinent surgical history    Social History   Social History     Substance and Sexual Activity   Alcohol Use Not Currently     Social History     Substance and Sexual Activity   Drug Use Not Currently    Types: Marijuana     Social History     Tobacco Use   Smoking Status Never Smoker   Smokeless Tobacco Never Used     Family History: non-contributory    Meds/Allergies   all medications and allergies reviewed  Allergies   Allergen Reactions    Ibuprofen Hives and Rash     Reaction Date: [id number];          Objective   Vitals: Blood pressure 106/66, pulse (!) 129, temperature 98 °F (36 7 °C), temperature source Oral, resp  rate 20, weight 57 6 kg (127 lb), last menstrual period 04/26/2019, SpO2 99 %  Body mass index is 28 47 kg/m²  Invasive Devices     None                 Physical Exam   Constitutional: She is oriented to person, place, and time  She appears well-developed and well-nourished  No distress  HENT:   Mouth/Throat:       Neck: Normal range of motion  Cardiovascular: Regular rhythm  tachycardic   Pulmonary/Chest: Effort normal and breath sounds normal  No respiratory distress  She has no wheezes  She exhibits no tenderness  Abdominal: Soft  There is no tenderness  There is no guarding  Gravid   Musculoskeletal: Normal range of motion  She exhibits no edema or deformity  Neurological: She is oriented to person, place, and time  She exhibits normal muscle tone  Skin: Skin is warm and dry  She is not diaphoretic  Psychiatric: Her behavior is normal      FHT: 140's with moderate variability upon initial placement of the EFM    [place]: no contractions    Prenatal Labs:   Blood Type:   Lab Results   Component Value Date/Time    ABO Grouping O 12/10/2019 10:53 PM     , D (Rh type):   Lab Results   Component Value Date/Time    Rh Factor Positive 12/10/2019 10:53 PM     , Antibody Screen: pending  HCT/HGB:   Lab Results   Component Value Date/Time    Hematocrit 29 8 (L) 12/12/2019 07:29 PM    Hematocrit 31 6 08/26/2014 06:31 PM    Hemoglobin 9 5 (L) 12/12/2019 07:29 PM    Hemoglobin 10 6 (L) 08/26/2014 06:31 PM      , Platelets:   Lab Results   Component Value Date/Time    Platelets 036 37/69/6722 07:29 PM    Platelets 800 67/94/2871 06:31 PM      , 1 hour Glucola:   Lab Results   Component Value Date/Time    Glucose 98 10/21/2019 11:53 AM   , Rubella:   Lab Results   Component Value Date/Time    Rubella IgG Quant 36 9 07/03/2019 03:27 PM        , VDRL/RPR:   Lab Results   Component Value Date/Time    RPR Non-Reactive 10/21/2019 11:53 AM      , Hep B:   Lab Results   Component Value Date/Time    Hepatitis B Surface Ag Non-reactive 07/03/2019 03:27 PM     , HIV:   Lab Results   Component Value Date/Time    HIV-1/HIV-2 Ab Non-Reactive 07/03/2019 03:27 PM     , Chlamydia: negative   Gonorrhea:   Lab Results   Component Value Date/Time    N GONORRHOEAE, AMPLIFIED DNA Negative 05/27/2015 07:24 PM    N gonorrhoeae, DNA Probe Negative 07/23/2019 11:03 AM         Imaging, EKG, Pathology, and Other Studies: I have personally reviewed pertinent reports

## 2019-12-20 NOTE — PROGRESS NOTES
NST from this morning reviewed    Baseline: 140, moderate variability, +accels, no decels  Intermittent irritability on Vashti Gallegos MD  OB/GYN PGY-2  12/20/2019  2:17 PM

## 2019-12-20 NOTE — UTILIZATION REVIEW
Initial Clinical Review    Admission: Date/Time/Statement: Inpatient Admission Orders (From admission, onward)     Ordered        12/19/19 2226  Inpatient Admission  Once                   Orders Placed This Encounter   Procedures    Inpatient Admission     Standing Status:   Standing     Number of Occurrences:   1     Order Specific Question:   Admitting Physician     Answer:   Ovi Santillan     Order Specific Question:   Level of Care     Answer:   Med Surg [16]     Order Specific Question:   Estimated length of stay     Answer:   More than 2 Midnights     Order Specific Question:   Certification     Answer:   I certify that inpatient services are medically necessary for this patient for a duration of greater than two midnights  See H&P and MD Progress Notes for additional information about the patient's course of treatment  ED Arrival Information     Expected Arrival Acuity Means of Arrival Escorted By Service Admission Type    - 12/19/2019 18:42 Emergent Ambulance [address] Emergency    Arrival Complaint    seizure        Chief Complaint   Patient presents with    Seizure - Prior Hx Of     pt had seizure about 1 hour ago  Pt reports having pain in fundus  pt anxious at this time  Pt 34 weeks pregnant and feels like having contractions at this time     Assessment/Plan:      20year old female 29 weeks gestation presents to ed for evaluation and treatment of a witnessed grand mal seizure  Evaluated by obstetrics who recommend magnesium for unlikely pre eclampsia for abundance of caution  Fetal heart rate is 140   Treated in ed with iv mag,  Iv fluids and iv keppra  Admit to inpatient medical surgical for seizures  Plan to consult neurology, initiate seizure precautions, obtain keppra level,  Continue keppra 500 bid po  Fetal non stress test x 4, 24 hour urine protein              ED Triage Vitals [12/19/19 1847]   Temperature Pulse Respirations Blood Pressure SpO2 98 °F (36 7 °C) (!) 129 20 106/66 99 %      Temp Source Heart Rate Source Patient Position - Orthostatic VS BP Location FiO2 (%)   Oral Monitor Lying Left arm --      Pain Score       8        Wt Readings from Last 1 Encounters:   12/19/19 57 6 kg (127 lb)     Additional Vital Signs    Vitals:    12/20/19 0015 12/20/19 0117 12/20/19 0249 12/20/19 0814   BP: (!) 89/48 (!) 93/44 100/64 100/55   BP Location: Right arm Right arm Right arm    Pulse: (!) 112 105     Resp: 18 18 18 16   Temp:   98 5 °F (36 9 °C) 98 7 °F (37 1 °C)   TempSrc:   Oral    SpO2: 96% 95%     Weight:       Height:   4' 8" (1 422 m)        Pertinent Labs/Diagnostic Test Results:     CT head without contrast   Final Result by Jimy Pham DO (12/19 2024)   No acute intracranial abnormality          Results from last 7 days   Lab Units 12/20/19  0612 12/19/19 1939   WBC Thousand/uL 10 79* 12 28*   HEMOGLOBIN g/dL 8 1* 9 0*   HEMATOCRIT % 26 1* 28 3*   PLATELETS Thousands/uL 253 284   NEUTROS ABS Thousands/µL 7 19 8 66*         Results from last 7 days   Lab Units 12/20/19  0612 12/19/19 1939   SODIUM mmol/L 138 139   POTASSIUM mmol/L 3 6 4 0   CHLORIDE mmol/L 109* 109*   CO2 mmol/L 22 24   ANION GAP mmol/L 7 6   BUN mg/dL 2* 4*   CREATININE mg/dL 0 40* 0 46*   EGFR ml/min/1 73sq m 150 144   CALCIUM mg/dL 8 6 9 0   MAGNESIUM mg/dL  --  1 8   PHOSPHORUS mg/dL  --  2 3*     Results from last 7 days   Lab Units 12/19/19  1939   AST U/L 20   ALT U/L 17   ALK PHOS U/L 121*   TOTAL PROTEIN g/dL 7 3   ALBUMIN g/dL 2 7*   TOTAL BILIRUBIN mg/dL 0 23         Results from last 7 days   Lab Units 12/20/19  0612 12/19/19 1939   GLUCOSE RANDOM mg/dL 78 88       Results from last 7 days   Lab Units 12/19/19  2336   PROTIME seconds 13 0   INR  1 02       Results from last 7 days   Lab Units 12/19/19 1934 12/19/19 1933 12/16/19  1040   CLARITY UA   --  Cloudy  --    COLOR UA   --  Yellow  --    SPEC GRAV UA   --  1 016  --    PH UA   --  7 0  --    GLUCOSE UA mg/dl  --  Negative negative   KETONES UA mg/dl  --  Negative  --    BLOOD UA   --  Negative  --    PROTEIN UA mg/dl  --  30 (1+)* negative   NITRITE UA   --  Negative  --    BILIRUBIN UA   --  Negative  --    UROBILINOGEN UA E U /dl  --  1 0  --    LEUKOCYTES UA   --  Negative  --    WBC UA /hpf  --  2-4*  --    RBC UA /hpf  --  2-4*  --    BACTERIA UA /hpf  --  Innumerable*  --    EPITHELIAL CELLS WET PREP /hpf  --  Moderate*  --    MUCUS THREADS   --  Moderate*  --    CREATININE UR mg/dL 119 0  --   --    PROTEIN UR mg/dL 65  --   --    PROT/CREAT RATIO UR  0 55*  --   --          Results from last 7 days   Lab Units 12/19/19  1934   AMPH/METH  Negative   BARBITURATE UR  Negative   BENZODIAZEPINE UR  Negative   COCAINE UR  Negative   METHADONE URINE  Negative   OPIATE UR  Negative   PCP UR  Negative   THC UR  Negative       ED Treatment:   Medication Administration from 12/19/2019 1842 to 12/20/2019 0232       Date/Time Order Dose Route Action     12/19/2019 2009 magnesium sulfate 4 g/100 mL IVPB (premix) 4 g 4 g Intravenous New Bag     12/19/2019 2032 magnesium sulfate 2 g/50 mL IVPB (premix) 2 g 2 g Intravenous New Bag     12/20/2019 0029 levETIRAcetam (KEPPRA) 500 mg in sodium chloride 0 9 % 100 mL IVPB 500 mg Intravenous New Bag     12/19/2019 2338 sodium chloride 0 9 % infusion 75 mL/hr Intravenous New Bag        Past Medical History:   Diagnosis Date    Seizure ([place] [address] )     Varicella      Present on Admission:   Anemia during pregnancy in third trimester   Seizure ([place] [address] )      Admitting Diagnosis:     Seizure ([place] [address] ) [R56 9]  33 weeks gestation of pregnancy [Z3A 33]      Age/Sex: 20 y o  female       Admission Orders:      Scheduled Medications:    Medications:  ferrous sulfate 325 mg Oral Daily With Breakfast   levETIRAcetam 1,000 mg Intravenous HS   [START ON 12/21/2019] levETIRAcetam 500 mg Intravenous QAM   prenatal multivitamin 1 tablet Oral Daily     Continuous IV Infusions:     PRN Meds:    LORazepam 2 mg Intravenous Q6H PRN       IP CONSULT TO OB GYN  IP CONSULT TO NEUROLOGY  IP CONSULT TO CASE MANAGEMENT    Network Utilization Review Department  [email or web address]  ATTENTION: Please call with any questions or concerns to [phone] and carefully listen to the prompts so that you are directed to the right person  All voicemails are confidential   Quentin Ellis all requests for admission clinical reviews, approved or denied determinations and any other requests to dedicated fax number below belonging to the campus where the patient is receiving treatment   List of dedicated fax numbers for the Facilities:  [address] DENIALS (Administrative/Medical Necessity) [phone]   1000  [facility] (Maternity/NICU/Pediatrics) [phone]   Wilfrid Atkinson [phone]   Imelda Lilly [phone]   [facility] [phone]   Eusebio Lanier [phone]   12[address] [facility] 1525 [facility] [phone]   [facility] [phone]   [address] [facility]  [address] [facility] 1000 [address] [facility] [phone]

## 2019-12-20 NOTE — H&P
H&P Exam - Carter Salgado 20 y o  female MRN: [id number]    Unit/Bed#: ED 07 Encounter: [id number]      Assessment/Plan     1  Seizures  · Witnessed grand mal seizure in the setting of known epilepsy  · Patient compliant with her home Keppra 500 mg b i d  · Likely given she is in her 3rd trimester of pregnancy may need to have dose adjustment  · CT head with no acute intracranial abnormality  · Electrolytes within normal limits  · Patient has been given IV magnesium by OB Gyne although preeclampsia not suspect that   · At this time will keep  Keppra 500 mg b i d however given that she is in the third trimester she may need increased dose to have therapeutic level, consider uptitrating to 750 mg BID  · Keppra level pending  · If patient were to have seizures will give IV Ativan 2 mg  · Neurology consult in place appreciate recommendations  · Continue seizure precautions  · IV fluids for hydration  · Keep patient NPO as she is somnolent at this time    2  Thirty-three weeks gestation of pregnancy  · 33 weeks and 6 days gestation  · Currently on fetal monitoring per Ob/Gyn  · Appreciate Ob recommendations     3  Anemia pregnancy  · Hemoglobin is 9 0  · Consistently down trending since July  · Patient is not taking her iron supplements  · Consider resuming iron supplements    Primary Care Physician: Enma Crowell MD  Admitting Provider: Tomas Cifuentes MD    Chief complaint:  Had a seizure    History of Present Illness   History, ROS and PFSH been obtained from chart review as patient is pretty somnolent and postictal   HPI:  Carter Salgado is a   20 y o  female who is at 33 weeks and six days weeks gestation who has past medical history of seizures who has presented after having a grand mal seizure earlier today  Patient had seizures as a child and has been seizure-free since the age of 14  Patient has been off of all anti epileptic drugs since the age of 14   Two months ago patient had her 1st she sure   She remained off antiepileptics and subsequently had a repeat of two more seizures  On December 11 patient was evaluated in the emergency department and patient was started on Keppra 500 mg b i d   Patient subsequently was admitted for three days from December 12 to December 15th on the Obstetrics and Gynecology Service where she was continued on the [address]  Patient has an appointment with Neurology today on December 20th  Patient describes her typical seizure onset to be proceeded with ringing in her left ear then she subsequently feels like she her whole body goes completely known which brings on the seizure onset  Patient does not smoke or drink alcohol  Urine drug screen negative  Patient has been hemodynamically stable with no hypertension  Patient's lab work consistent with 1+ protein on urinalysis  However no elevated LFTs for platelet abnormality  This was an unplanned pregnancy  Patient's partner is currently incarcerated  Patient currently lives in a shelter however her father lives in this area  Patient's plan is to move to [place] where her mother lives  Patient cannot provide any history of what medications she has been on in the past     Review of Systems   Unable to perform ROS: Other (Patient is somnolent and intermittently not answering questions)   Constitutional: Positive for fatigue  Negative for appetite change and fever  HENT:        Ringing in her left ear  Pain inside her mouth     Eyes: Positive for visual disturbance  Respiratory: Negative for cough, chest tightness and wheezing  Cardiovascular: Negative for chest pain, palpitations and leg swelling  Gastrointestinal: Negative for nausea and vomiting  Abdominal distention:  pregnant  Genitourinary: Negative for difficulty urinating and dysuria  Musculoskeletal: Positive for myalgias  Skin: Negative for color change  Neurological: Positive for seizures, weakness and headaches  Historical Information   Past Medical History:   Diagnosis Date    [facility]ure [facility] [address] )     Varicella      History reviewed  No pertinent surgical history  Social History   Social History     Substance and Sexual Activity   Alcohol Use Not Currently     Social History     Substance and Sexual Activity   Drug Use Not Currently    Types: Marijuana     Social History     Tobacco Use   Smoking Status Never Smoker   Smokeless Tobacco Never Used     Family History:   Family History   Problem Relation Age of Onset    Fibroids Mother     No Known Problems Father     Asthma Brother     Diabetes Maternal Grandmother     Breast cancer Maternal Grandmother     Brain cancer Maternal Grandmother     Diabetes Maternal Grandfather     Dementia Maternal Grandfather     Diabetes Paternal Grandmother     No Known Problems Paternal Grandfather        Meds/Allergies   PTA meds:   Prior to Admission Medications   Prescriptions Last Dose Informant Patient Reported? Taking?    Prenatal Vit-Fe Fumarate-FA (PRENATAL 1+1 PO)  Self Yes No   Sig: Take by mouth   Pyridoxine HCl (VITAMIN B-6) 25 MG tablet Not Taking at Unknown time Self No No   Sig: Take 1 tablet (25 mg total) by mouth daily   Patient not taking: Reported on 12/16/2019   diphenhydrAMINE (BENADRYL) 50 mg capsule Not Taking at Unknown time Self No No   Sig: Take 1 capsule (50 mg total) by mouth daily at bedtime as needed for sleep   Patient not taking: Reported on 12/16/2019   docusate sodium (COLACE) 100 mg capsule Not Taking at Unknown time Self No No   Sig: Take 1 capsule (100 mg total) by mouth 2 (two) times a day   Patient not taking: Reported on 12/6/2019   doxylamine (UNISON) 25 MG tablet Not Taking at Unknown time Self No No   Sig: Take 1 tablet (25 mg total) by mouth daily at bedtime as needed for sleep or nausea   Patient not taking: Reported on 12/6/2019   ferrous sulfate 324 (65 Fe) mg Not Taking at Unknown time Self No No   Sig: Take 1 tablet (324 mg total) by mouth every other day   Patient not taking: Reported on 12/16/2019   hydrOXYzine HCL (ATARAX) 10 mg tablet Not Taking at Unknown time Self No No   Sig: Take 1 tablet (10 mg total) by mouth every 6 (six) hours as needed for anxiety   Patient not taking: Reported on 12/16/2019   levETIRAcetam (KEPPRA) 500 mg tablet   No No   Sig: Take 1 tablet (500 mg total) by mouth every 12 (twelve) hours   sertraline (ZOLOFT) 50 mg tablet Not Taking at Unknown time Self No No   Sig: Take 1 tablet (50 mg total) by mouth daily   Patient not taking: Reported on 12/16/2019      Facility-Administered Medications: None     Allergies   Allergen Reactions    Ibuprofen Hives and Rash     Reaction Date: [id number];          Objective   Vitals: Blood pressure (!) 89/48, pulse (!) 112, temperature 98 °F (36 7 °C), temperature source Oral, resp  rate 18, weight 57 6 kg (127 lb), last menstrual period 04/26/2019, SpO2 96 %  Intake/Output Summary (Last 24 hours) at 12/20/2019 0032  Last data filed at 12/19/2019 2042  Gross per 24 hour   Intake 108 33 ml   Output --   Net 108 33 ml       Invasive Devices     Peripheral Intravenous Line            Peripheral IV 12/19/19 Left Antecubital less than 1 day                Physical Exam   Constitutional: She appears well-developed and well-nourished  HENT:   Head: Normocephalic and atraumatic  Mouth/Throat: Mucous membranes are dry  Lacerations (bleeding of tongue) present  Eyes: Pupils are equal, round, and reactive to light  Conjunctivae are normal    Neck: Neck supple  Cardiovascular: Normal rate, regular rhythm, normal heart sounds and intact distal pulses  Exam reveals no friction rub  No murmur heard  Pulmonary/Chest: Effort normal and breath sounds normal  No stridor  No respiratory distress  Abdominal: Bowel sounds are normal    Musculoskeletal: She exhibits no edema or tenderness  Neurological: She is alert  No cranial nerve deficit or sensory deficit     Skin: Skin is warm and dry  No erythema  Psychiatric:   Lethargic         Lab Results: I have personally reviewed pertinent reports  CBC:   Results from last 7 days   Lab Units 12/19/19  1939   WBC Thousand/uL 12 28*   RBC Million/uL 3 42*   HEMOGLOBIN g/dL 9 0*   HEMATOCRIT % 28 3*   MCV fL 83   MCH pg 26 3*   MCHC g/dL 31 8   RDW % 13 2   MPV fL 10 1   PLATELETS Thousands/uL 284   NRBC AUTO /100 WBCs 0   NEUTROS PCT % 70   LYMPHS PCT % 17   MONOS PCT % 11   EOS PCT % 1   BASOS PCT % 0   NEUTROS ABS Thousands/µL 8 66*   LYMPHS ABS Thousands/µL 2 10   MONOS ABS Thousand/µL 1 32*   EOS ABS Thousand/µL 0 06   , Chemistry Profile:   Results from last 7 days   Lab Units 12/19/19  1939   POTASSIUM mmol/L 4 0   CHLORIDE mmol/L 109*   CO2 mmol/L 24   BUN mg/dL 4*   CREATININE mg/dL 0 46*   CALCIUM mg/dL 9 0   AST U/L 20   ALT U/L 17   ALK PHOS U/L 121*   EGFR ml/min/1 73sq m 144   , Coagulation Studies:   Results from last 7 days   Lab Units 12/19/19  2336   PROTIME seconds 13 0   INR  1 02   , Cardiac Studies:   , Additional Labs:   , iSTAT CHEM 8:   Results from last 7 days   Lab Units 12/19/19  1939   ANION GAP mmol/L 6   EGFR ml/min/1 73sq m 144   HEMOGLOBIN g/dL 9 0*   , ABG:   , Toxicology:   Results from last 7 days   Lab Units 12/19/19  1934   BARBITURATE UR  Negative   BENZODIAZEPINE UR  Negative   COCAINE UR  Negative   OPIATE UR  Negative   PCP UR  Negative   THC UR  Negative   , Last A1C/Lipid Panel/Thyroid Panel: No results found for: HGBA1C, TRIG, CHOL, HDL, LDLCALC, [id number], T3FREE, L3CLEUF, FREET4    Imaging: I have personally reviewed pertinent reports  and I have personally reviewed pertinent films in PACS  Ct Head Without Contrast    Result Date: 12/19/2019  Narrative: CT BRAIN - WITHOUT CONTRAST INDICATION:   seizure with head strike  COMPARISON:  December 11, 2019 TECHNIQUE:  CT examination of the brain was performed    In addition to axial images, coronal 2D reformatted images were created and submitted for interpretation  Radiation dose length product (DLP) for this visit:  780 53 mGy-cm   This examination, like all CT scans performed in the [facility], was performed utilizing techniques to minimize radiation dose exposure, including the use of iterative  reconstruction and automated exposure control  IMAGE QUALITY:  Diagnostic  FINDINGS: PARENCHYMA:  No intracranial mass, mass effect or midline shift  No CT signs of acute infarction  No acute parenchymal hemorrhage  VENTRICLES AND EXTRA-AXIAL SPACES:  Normal for the patient's age  VISUALIZED ORBITS AND PARANASAL SINUSES:  Unremarkable  CALVARIUM AND EXTRACRANIAL SOFT TISSUES:  Normal      Impression: No acute intracranial abnormality  Workstation performed: [id number]     Ct Head Without Contrast    Result Date: 12/11/2019  Narrative: CT BRAIN - WITHOUT CONTRAST INDICATION:   Seizure, fall  COMPARISON:  August 26, 2014 TECHNIQUE:  CT examination of the brain was performed  In addition to axial images, coronal 2D reformatted images were created and submitted for interpretation  Radiation dose length product (DLP) for this visit:  782 45 mGy-cm   This examination, like all CT scans performed in the [facility], was performed utilizing techniques to minimize radiation dose exposure, including the use of iterative  reconstruction and automated exposure control  IMAGE QUALITY:  Diagnostic  FINDINGS: PARENCHYMA:  No intracranial mass, mass effect or midline shift  No CT signs of acute infarction  No acute parenchymal hemorrhage  VENTRICLES AND EXTRA-AXIAL SPACES:  Normal for the patient's age  VISUALIZED ORBITS AND PARANASAL SINUSES:  Unremarkable  CALVARIUM AND EXTRACRANIAL SOFT TISSUES:  Normal      Impression: No acute intracranial abnormality  Workstation performed: [id number]     Radiology Results    Result Date: 12/17/2019  Narrative: Ordered by an unspecified provider      Radiology Results    Result Date: 12/13/2019  Narrative: Ordered by an unspecified provider  Radiology Results    Result Date: 12/11/2019  Narrative: Ordered by an unspecified provider  EKG, Pathology, and Other Studies: I have personally reviewed pertinent reports  and I have personally reviewed pertinent films in PACS    Code Status: Level 1 - Full Code  VTE Pharmacologic Prophylaxis: Reason for no pharmacologic prophylaxis low VTE screen   VTE Mechanical Prophylaxis: sequential compression device  Admission Status: INPATIENT     Admission Time  I spent 30 minutes admitting the patient  This involved direct patient contact where I performed a full history and physical, reviewing previous records, and reviewing laboratory and other diagnostic studies      Uli Sky MD  Internal Medicine  PGY-3

## 2019-12-20 NOTE — QUICK NOTE
Mariaelena Gudino, the director of the shelter the patient lives in has concerns about patient's safety going back to the shelter    Please call her before discharge  Phone number [phone]
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
## 2019-12-20 NOTE — PLAN OF CARE
Problem: Potential for Falls  Goal: Patient will remain free of falls  Description  INTERVENTIONS:  - Assess patient frequently for physical needs  -  Identify cognitive and physical deficits and behaviors that affect risk of falls  -  [place] fall precautions as indicated by assessment   - Educate patient/family on patient safety including physical limitations  - Instruct patient to call for assistance with activity based on assessment  - Modify environment to reduce risk of injury  - Consider OT/PT consult to assist with strengthening/mobility  Outcome: Progressing     Problem: SAFETY ADULT  Goal: Patient will remain free of falls  Description  INTERVENTIONS:  - Assess patient frequently for physical needs  -  Identify cognitive and physical deficits and behaviors that affect risk of falls    -  [place] fall precautions as indicated by assessment   - Educate patient/family on patient safety including physical limitations  - Instruct patient to call for assistance with activity based on assessment  - Modify environment to reduce risk of injury  - Consider OT/PT consult to assist with strengthening/mobility  Outcome: Progressing     Problem: DISCHARGE PLANNING  Goal: Discharge to home or other facility with appropriate resources  Description  INTERVENTIONS:  - Identify barriers to discharge w/patient and caregiver  - Arrange for needed discharge resources and transportation as appropriate  - Identify discharge learning needs (meds, wound care, etc )  - Arrange for interpretive services to assist at discharge as needed  - Refer to Case Management Department for coordinating discharge planning if the patient needs post-hospital services based on physician/advanced practitioner order or complex needs related to functional status, cognitive ability, or social support system  Outcome: Progressing     Problem: METABOLIC, FLUID AND ELECTROLYTES - ADULT  Goal: Electrolytes maintained within normal limits  Description  INTERVENTIONS:  - Monitor labs and assess patient for signs and symptoms of electrolyte imbalances  - Administer electrolyte replacement as ordered  - Monitor response to electrolyte replacements, including repeat lab results as appropriate  - Instruct patient on fluid and nutrition as appropriate  Outcome: Progressing

## 2019-12-20 NOTE — SOCIAL WORK
CM met with patient and explained CM role  Patient lives in a shelter  No DME, independent, doesn't drive  Patient's parents are "up state " Patient's emergency contacts are her mother Thomas Vital at [phone]  Patients father is Zacarias Em [phone]  Patient states she will go to live with her mom, once she is discharged  Patient denies history of MH & drugs/etoh  Patient denies hx of STR & HHC  Patient uses [facility] on [address] [facility]  CM reviewed d/c planning process including the following: identifying help at home, patient preference for d/c planning needs, Discharge Lounge, [facility] Meds to Bed program, availability of treatment team to discuss questions or concerns patient and/or family may have regarding understanding medications and recognizing signs and symptoms once discharged  CM also encouraged patient to follow up with all recommended appointments after discharge  Patient advised of importance for patient and family to participate in managing patients medical well being

## 2019-12-20 NOTE — PROGRESS NOTES
Progress Note - OB/GYN   Tiki Claude 20 y o  female MRN: [id number]  Unit/Bed#: [facility] 730-01 Encounter: [id number]    Assessment:  20 y o   at 31w0d w/ epilepsy s/p grand mal seizure on   No obstetrical complaints  Plan:  #1  S/p grand mal seizure:  - Likely due to her epilepsy  - Unlikely to be eclampsia given normal blood pressures  - S/p 6g magnesium   - Continue Keppra 500mg BID, f/u keppra level  - Negative Head CT  - CBC/CMP WNL  - F/u 24hr urine protein  - Neurology consult    #2  IUP at 34w0d:  - No obstetrical complaints  - NST TID for 1 hour    #3  Anemia:  - Hgb 9, continue iron supplementation      Subjective/Objective   Subjective:   Doing well this morning  Denies leakage of fluid or vaginal bleeding  Reports that the "baby is kicking a lot" and that she sometimes feels abdominal tightening, but denies regular contractions  Reports that she plants to move to [facility]  Could not tell me where exactly  Objective:     Vitals: Blood pressure 100/64, pulse 105, temperature 98 5 °F (36 9 °C), temperature source Oral, resp  rate 18, height 4' 8" (1 422 m), weight 57 6 kg (127 lb), last menstrual period 2019, SpO2 95 %  Physical Exam:     Physical Exam   Constitutional: She is oriented to person, place, and time  She appears well-developed and well-nourished  Cardiovascular: Normal rate  Pulmonary/Chest: Effort normal  No respiratory distress  Abdominal:   Gravid, soft, non-tender   Neurological: She is alert and oriented to person, place, and time  Skin: Skin is warm and dry  Psychiatric: She has a normal mood and affect  Her behavior is normal        Lab, Imaging and other studies: I have personally reviewed pertinent reports        Lab Results   Component Value Date    WBC 12 28 (H) 2019    HGB 9 0 (L) 2019    HCT 28 3 (L) 2019    MCV 83 2019     2019       Zane Gallego MD  OB/GYN PGY-2  2019  6:18 AM

## 2019-12-21 LAB — LEVETIRACETAM SERPL-MCNC: 2.4 UG/ML (ref 10–40)

## 2019-12-21 PROCEDURE — NC001 PR NO CHARGE: Performed by: OBSTETRICS & GYNECOLOGY

## 2019-12-21 RX ORDER — LEVETIRACETAM 500 MG/1
TABLET ORAL
Qty: 90 TABLET | Refills: 0 | Status: ON HOLD | OUTPATIENT
Start: 2019-12-21 | End: 2019-12-28 | Stop reason: SDUPTHER

## 2019-12-21 RX ORDER — FERROUS SULFATE TAB EC 324 MG (65 MG FE EQUIVALENT) 324 (65 FE) MG
324 TABLET DELAYED RESPONSE ORAL EVERY OTHER DAY
Qty: 15 TABLET | Refills: 0 | Status: ON HOLD | OUTPATIENT
Start: 2019-12-21 | End: 2020-10-20

## 2019-12-21 RX ADMIN — IRON SUCROSE 200 MG: 20 INJECTION, SOLUTION INTRAVENOUS at 10:02

## 2019-12-21 RX ADMIN — LEVETIRACETAM 500 MG: 100 INJECTION, SOLUTION INTRAVENOUS at 09:22

## 2019-12-21 RX ADMIN — PRENATAL VIT W/ FE FUMARATE-FA TAB 27-0.8 MG 1 TABLET: 27-0.8 TAB at 09:22

## 2019-12-21 RX ADMIN — FERROUS SULFATE TAB 325 MG (65 MG ELEMENTAL FE) 325 MG: 325 (65 FE) TAB at 09:22

## 2019-12-21 NOTE — NURSING NOTE
Paged [facility] to come to the bedside for fetal monitoring  Left a message to [facility]  Awaiting call back

## 2019-12-21 NOTE — ED PROVIDER NOTES
History  Chief Complaint   Patient presents with    Seizure - Prior Hx Of     pt had seizure about 1 hour ago  Pt reports having pain in fundus  pt anxious at this time  Pt 34 weeks pregnant and feels like having contractions at this time     This is a 18-year-old female  pregnant at 35 weeks presenting today after seizure  This is her 3rd or 4th seizure this pregnancy and she was placed on Keppra this past week 500 mg b i d  She is still had breakthrough seizures  She has been evaluated by OBGYN for preeclampsia and eclampsia and given her normal blood pressure and lack of protein in her urine, this is believed to be due to a primary seizure disorder and not to claim stable  Patient denies any recent fever, chills, nausea, vomiting, diarrhea  She does have a headache after the seizure and she was initially postictal   She is currently alert and oriented by 3  She does have tongue biting as well  Denies any drug use  Currently no contractions, which demonstrated she does feel baby move, and denies loss of fluid, blood  History provided by:  Patient   used: No    Seizure - Prior Hx Of   Seizure activity on arrival: no    Seizure type:  Unable to specify  Preceding symptoms: panic    Initial focality:  Unable to specify  Postictal symptoms: confusion    Return to baseline: yes    Severity:  Moderate  Duration: Unknown  Timing:  Once  Number of seizures this episode:  1  Progression:  Unchanged  Context: decreased sleep, emotional upset, pregnancy and stress    Context: not change in medication, not drug use and not fever    Recent head injury:  During the event  PTA treatment:  None  History of seizures: yes        Prior to Admission Medications   Prescriptions Last Dose Informant Patient Reported? Taking?    Prenatal Vit-Fe Fumarate-FA (PRENATAL 1+1 PO)  Self Yes No   Sig: Take by mouth   ferrous sulfate 324 (65 Fe) mg Not Taking at Unknown time Self No No   Sig: Take 1 tablet (324 mg total) by mouth every other day   Patient not taking: Reported on 12/16/2019   levETIRAcetam (KEPPRA) 500 mg tablet   No No   Sig: Take 1 tablet (500 mg total) by mouth every 12 (twelve) hours      Facility-Administered Medications: None       Past Medical History:   Diagnosis Date    Seizure ([facility] [address] )     Varicella        History reviewed  No pertinent surgical history  Family History   Problem Relation Age of Onset    Fibroids Mother     No Known Problems Father     Asthma Brother     Diabetes Maternal Grandmother     Breast cancer Maternal Grandmother     Brain cancer Maternal Grandmother     Diabetes Maternal Grandfather     Dementia Maternal Grandfather     Diabetes Paternal Grandmother     No Known Problems Paternal Grandfather      I have reviewed and agree with the history as documented  Social History     Tobacco Use    Smoking status: Never Smoker    Smokeless tobacco: Never Used   Substance Use Topics    Alcohol use: Not Currently     Frequency: Never     Drinks per session: Patient refused     Binge frequency: Never    Drug use: Not Currently     Types: Marijuana        Review of Systems   Constitutional: Positive for chills  Negative for diaphoresis and fever  HENT: Negative  Eyes: Negative  Negative for visual disturbance  Respiratory: Negative  Negative for shortness of breath  Cardiovascular: Negative  Negative for chest pain  Gastrointestinal: Negative  Negative for abdominal pain, nausea and vomiting  Endocrine: Negative  Genitourinary: Negative  Negative for pelvic pain, vaginal bleeding and vaginal discharge  Pregnant   Musculoskeletal: Negative  Negative for myalgias  Skin: Negative  Negative for rash  Allergic/Immunologic: Negative  Neurological: Positive for headaches  Negative for light-headedness and numbness  Hematological: Negative  Psychiatric/Behavioral: Positive for confusion     All other systems reviewed and are negative  Physical Exam  ED Triage Vitals [12/19/19 1847]   Temperature Pulse Respirations Blood Pressure SpO2   98 °F (36 7 °C) (!) 129 20 106/66 99 %      Temp Source Heart Rate Source Patient Position - Orthostatic VS BP Location FiO2 (%)   Oral Monitor Lying Left arm --      Pain Score       8             Orthostatic Vital Signs  Vitals:    12/20/19 0117 12/20/19 0249 12/20/19 0814 12/20/19 2218   BP: (!) 93/44 100/64 100/55 114/66   Pulse: 105      Patient Position - Orthostatic VS: Lying Lying         Physical Exam   Constitutional: She is oriented to person, place, and time  She appears well-developed and well-nourished  HENT:   Head: Normocephalic and atraumatic  Mouth/Throat: Oropharynx is clear and moist    Eyes: Pupils are equal, round, and reactive to light  Conjunctivae and EOM are normal    Neck: Normal range of motion  Neck supple  Cardiovascular: Normal rate and regular rhythm  Pulmonary/Chest: Effort normal and breath sounds normal    Abdominal: Soft  Bowel sounds are normal  She exhibits no distension  There is no tenderness  Pregnant   Musculoskeletal: Normal range of motion  Neurological: She is alert and oriented to person, place, and time  She has normal strength  No cranial nerve deficit or sensory deficit  Coordination normal  GCS eye subscore is 4  GCS verbal subscore is 5  GCS motor subscore is 6  Skin: Skin is warm and dry  Psychiatric: She has a normal mood and affect  Nursing note and vitals reviewed        ED Medications  Medications   LORazepam (ATIVAN) 2 mg/mL injection 2 mg (has no administration in time range)   ferrous sulfate tablet 325 mg (325 mg Oral Refused 12/20/19 0828)   prenatal multivitamin tablet 1 tablet (1 tablet Oral Not Given 12/20/19 0826)   levETIRAcetam (KEPPRA) 500 mg in sodium chloride 0 9 % 100 mL IVPB (has no administration in time range)   levETIRAcetam (KEPPRA) 1,000 mg in sodium chloride 0 9 % 100 mL IVPB (1,000 mg Intravenous New Bag 12/20/19 2103)   iron sucrose (VENOFER) 200 mg in sodium chloride 0 9 % 100 mL IVPB (200 mg Intravenous New Bag 12/20/19 1755)   magnesium sulfate 4 g/100 mL IVPB (premix) 4 g (0 g Intravenous Stopped 12/19/19 2029)     Followed by   magnesium sulfate 2 g/50 mL IVPB (premix) 2 g (0 g Intravenous Stopped 12/19/19 2042)       Diagnostic Studies  Results Reviewed     Procedure Component Value Units Date/Time    Urine culture [[id number]] Collected:  12/19/19 1933    Lab Status:  Final result Specimen:  Urine, Clean Catch Updated:  12/20/19 1821     Urine Culture No Growth <1000 cfu/mL    CBC and differential [[id number]]  (Abnormal) Collected:  12/20/19 0612    Lab Status:  Final result Specimen:  Blood from Arm, Right Updated:  12/20/19 0753     WBC 10 79 Thousand/uL      RBC 3 08 Million/uL      Hemoglobin 8 1 g/dL      Hematocrit 26 1 %      MCV 85 fL      MCH 26 3 pg      MCHC 31 0 g/dL      RDW 13 2 %      MPV 10 3 fL      Platelets 835 Thousands/uL      nRBC 0 /100 WBCs      Neutrophils Relative 66 %      Immat GRANS % 1 %      Lymphocytes Relative 22 %      Monocytes Relative 10 %      Eosinophils Relative 1 %      Basophils Relative 0 %      Neutrophils Absolute 7 19 Thousands/µL      Immature Grans Absolute 0 08 Thousand/uL      Lymphocytes Absolute 2 35 Thousands/µL      Monocytes Absolute 1 08 Thousand/µL      Eosinophils Absolute 0 06 Thousand/µL      Basophils Absolute 0 03 Thousands/µL     Basic metabolic panel [[id number]]  (Abnormal) Collected:  12/20/19 0612    Lab Status:  Final result Specimen:  Blood from Arm, Right Updated:  12/20/19 0739     Sodium 138 mmol/L      Potassium 3 6 mmol/L      Chloride 109 mmol/L      CO2 22 mmol/L      ANION GAP 7 mmol/L      BUN 2 mg/dL      Creatinine 0 40 mg/dL      Glucose 78 mg/dL      Calcium 8 6 mg/dL      eGFR 150 ml/min/1 73sq m     Narrative:       [place] guidelines for Chronic Kidney Disease (CKD):     Stage 1 with normal or high GFR (GFR > 90 mL/min/1 73 square meters)    Stage 2 Mild CKD (GFR = 60-89 mL/min/1 73 square meters)    Stage 3A Moderate CKD (GFR = 45-59 mL/min/1 73 square meters)    Stage 3B Moderate CKD (GFR = 30-44 mL/min/1 73 square meters)    Stage 4 Severe CKD (GFR = 15-29 mL/min/1 73 square meters)    Stage 5 End Stage CKD (GFR <15 mL/min/1 73 square meters)  Note: GFR calculation is accurate only with a steady state creatinine    Magnesium [[id number]]  (Normal) Collected:  12/19/19 1939    Lab Status:  Final result Specimen:  Blood from Arm, Left Updated:  12/20/19 0158     Magnesium 1 8 mg/dL     Phosphorus [[id number]]  (Abnormal) Collected:  12/19/19 1939    Lab Status:  Final result Specimen:  Blood from Arm, Left Updated:  12/20/19 0158     Phosphorus 2 3 mg/dL     Protime-INR [[id number]]  (Normal) Collected:  12/19/19 2336    Lab Status:  Final result Specimen:  Blood from Arm, Left Updated:  12/19/19 2358     Protime 13 0 seconds      INR 1 02    Urine Microscopic [[id number]]  (Abnormal) Collected:  12/19/19 1933    Lab Status:  Final result Specimen:  Urine, Clean Catch Updated:  12/19/19 2048     RBC, UA 2-4 /hpf      WBC, UA 2-4 /hpf      Epithelial Cells Moderate /hpf      Bacteria, UA Innumerable /hpf      MUCUS THREADS Moderate     URINE COMMENT --    Rapid drug screen, urine [[id number]]  (Normal) Collected:  12/19/19 1934    Lab Status:  Final result Specimen:  Urine, Clean Catch Updated:  12/19/19 2015     Amph/Meth UR Negative     Barbiturate Ur Negative     Benzodiazepine Urine Negative     Cocaine Urine Negative     Methadone Urine Negative     Opiate Urine Negative     PCP Ur Negative     THC Urine Negative    Narrative:       FOR MEDICAL PURPOSES ONLY  IF CONFIRMATION NEEDED PLEASE CONTACT THE LAB WITHIN 5 DAYS      Drug Screen Cutoff Levels:  AMPHETAMINE/METHAMPHETAMINES  1000 ng/mL  BARBITURATES     200 ng/mL  BENZODIAZEPINES     200 ng/mL  COCAINE      300 ng/mL  METHADONE      300 ng/mL  OPIATES      300 ng/mL  PHENCYCLIDINE     25 ng/mL  THC       50 ng/mL      Protein / creatinine ratio, urine [[id number]]  (Abnormal) Collected:  12/19/19 1934    Lab Status:  Final result Specimen:  Urine, Clean Catch Updated:  12/19/19 2012     Creatinine, Ur 119 0 mg/dL      Protein Urine Random 65 mg/dL      Prot/Creat Ratio, Ur 0 55    Comprehensive metabolic panel [[id number]]  (Abnormal) Collected:  12/19/19 1939    Lab Status:  Final result Specimen:  Blood from Arm, Left Updated:  12/19/19 2010     Sodium 139 mmol/L      Potassium 4 0 mmol/L      Chloride 109 mmol/L      CO2 24 mmol/L      ANION GAP 6 mmol/L      BUN 4 mg/dL      Creatinine 0 46 mg/dL      Glucose 88 mg/dL      Calcium 9 0 mg/dL      AST 20 U/L      ALT 17 U/L      Alkaline Phosphatase 121 U/L      Total Protein 7 3 g/dL      Albumin 2 7 g/dL      Total Bilirubin 0 23 mg/dL      eGFR 144 ml/min/1 73sq m     Narrative:       [place] guidelines for Chronic Kidney Disease (CKD):     Stage 1 with normal or high GFR (GFR > 90 mL/min/1 73 square meters)    Stage 2 Mild CKD (GFR = 60-89 mL/min/1 73 square meters)    Stage 3A Moderate CKD (GFR = 45-59 mL/min/1 73 square meters)    Stage 3B Moderate CKD (GFR = 30-44 mL/min/1 73 square meters)    Stage 4 Severe CKD (GFR = 15-29 mL/min/1 73 square meters)    Stage 5 End Stage CKD (GFR <15 mL/min/1 73 square meters)  Note: GFR calculation is accurate only with a steady state creatinine    UA w Reflex to Microscopic w Reflex to Culture [[id number]]  (Abnormal) Collected:  12/19/19 1933    Lab Status:  Final result Specimen:  Urine, Clean Catch Updated:  12/19/19 2008     Color, UA Yellow     Clarity, UA Cloudy     Specific Gravity, UA 1 016     pH, UA 7 0     Leukocytes, UA Negative     Nitrite, UA Negative     Protein, UA 30 (1+) mg/dl      Glucose, UA Negative mg/dl      Ketones, UA Negative mg/dl      Urobilinogen, UA 1 0 E U /dl      Bilirubin, UA Negative Blood, UA Negative     URINE COMMENT --    CBC and differential [[id number]]  (Abnormal) Collected:  19    Lab Status:  Final result Specimen:  Blood from Arm, Left Updated:  19     WBC 12 28 Thousand/uL      RBC 3 42 Million/uL      Hemoglobin 9 0 g/dL      Hematocrit 28 3 %      MCV 83 fL      MCH 26 3 pg      MCHC 31 8 g/dL      RDW 13 2 %      MPV 10 1 fL      Platelets 034 Thousands/uL      nRBC 0 /100 WBCs      Neutrophils Relative 70 %      Immat GRANS % 1 %      Lymphocytes Relative 17 %      Monocytes Relative 11 %      Eosinophils Relative 1 %      Basophils Relative 0 %      Neutrophils Absolute 8 66 Thousands/µL      Immature Grans Absolute 0 11 Thousand/uL      Lymphocytes Absolute 2 10 Thousands/µL      Monocytes Absolute 1 32 Thousand/µL      Eosinophils Absolute 0 06 Thousand/µL      Basophils Absolute 0 03 Thousands/µL     Levetiracetam level [[id number]] Collected:  19    Lab Status: In process Specimen:  Blood from Arm, Left Updated:  19                 CT head without contrast   Final Result by Joe Osborne DO (2024)   No acute intracranial abnormality  Workstation performed: [id number]               Procedures  Procedures      ED Course                               MDM  Number of Diagnoses or Management Options  33 weeks gestation of pregnancy: established and worsening  Seizure ([facility] [address] established and worsening  Diagnosis management comments: She is a 30-year-old  presenting today after another seizure  OB gyn is at bedside and would like to evaluate the patient in tandem  This is the 3rd or 4th seizure of her pregnancy  Fetal heart rates wnl  No loss of fluid, she reports good fetal movement, no abdominal pain  She denies any inciting event, infectious symptoms, trauma  She did hit her head after this episode  OBGYN recommended magnesium even though they do not believe this is elampsia   We will evaluate with CT of her head  CT head normal   Labs within normal limits  We will admit her to [facility] for further management as she needs to see Neurology for further workup of her seizure disorder  OBGYN will likely continue to monitor the patient while she is inpatient  Patient is agreeable to admission  Answered all her questions  Amount and/or Complexity of Data Reviewed  Clinical lab tests: ordered and reviewed  Tests in the radiology section of CPT®: ordered and reviewed  Tests in the medicine section of CPT®: reviewed and ordered  Discuss the patient with other providers: yes  Independent visualization of images, tracings, or specimens: yes    Patient Progress  Patient progress: stable        Disposition  Final diagnoses:   Seizure ([facility] )   33 weeks gestation of pregnancy     Time reflects when diagnosis was documented in both MDM as applicable and the Disposition within this note     Time User Action Codes Description Comment    12/19/2019 10:25 PM Desi Beltran Add [R56 9] [facility]     12/19/2019 10:26 PM Desi Beltran Add [Z3A 33] 33 weeks gestation of pregnancy     12/20/2019  1:33 PM Nancy Harm Add [O99 013] Anemia during pregnancy in third trimester     12/20/2019  1:33 PM Nancy Harm Modify [O99 013] Anemia during pregnancy in third trimester       ED Disposition     ED Disposition Condition Date/Time Comment    Admit Stable Thu Dec 19, 2019 10:25 PM Case was discussed with [facility] and the patient's admission status was agreed to be Admission Status: inpatient status to the service of Dr Dorian Samson           Follow-up Information    None         Current Discharge Medication List      CONTINUE these medications which have NOT CHANGED    Details   ferrous sulfate 324 (65 Fe) mg Take 1 tablet (324 mg total) by mouth every other day  Qty: 60 tablet, Refills: 0    Associated Diagnoses: Anemia during pregnancy in third trimester      levETIRAcetam (KEPPRA) 500 mg tablet Take 1 tablet (500 mg total) by mouth every 12 (twelve) hours  Qty: 60 tablet, Refills: 2    Associated Diagnoses: Seizure ([facility] [address] )      Prenatal Vit-Fe Fumarate-FA (PRENATAL 1+1 PO) Take by mouth           No discharge procedures on file  ED Provider  Attending physically available and evaluated [facility]  I managed the patient along with the ED Attending      Electronically Signed by         Alvaro Jain MD  12/21/19 5386

## 2019-12-21 NOTE — SOCIAL WORK
CM contacted by Carly Dotson at [facility]  Germanmarian Cook states that patient had been residing there, but due to her medically needs cannot return  Carly Dotson stated that Pt is aware of this and that she is currently working with patient to contact Pt's parents to further assist   Information provided to CM covering floor

## 2019-12-21 NOTE — PLAN OF CARE
Problem: Potential for Falls  Goal: Patient will remain free of falls  Description  INTERVENTIONS:  - Assess patient frequently for physical needs  -  Identify cognitive and physical deficits and behaviors that affect risk of falls  -  [place] fall precautions as indicated by assessment   - Educate patient/family on patient safety including physical limitations  - Instruct patient to call for assistance with activity based on assessment  - Modify environment to reduce risk of injury  - Consider OT/PT consult to assist with strengthening/mobility  Outcome: Adequate for Discharge     Problem: SAFETY ADULT  Goal: Patient will remain free of falls  Description  INTERVENTIONS:  - Assess patient frequently for physical needs  -  Identify cognitive and physical deficits and behaviors that affect risk of falls    -  [place] fall precautions as indicated by assessment   - Educate patient/family on patient safety including physical limitations  - Instruct patient to call for assistance with activity based on assessment  - Modify environment to reduce risk of injury  - Consider OT/PT consult to assist with strengthening/mobility  Outcome: Adequate for Discharge     Problem: DISCHARGE PLANNING  Goal: Discharge to home or other facility with appropriate resources  Description  INTERVENTIONS:  - Identify barriers to discharge w/patient and caregiver  - Arrange for needed discharge resources and transportation as appropriate  - Identify discharge learning needs (meds, wound care, etc )  - Arrange for interpretive services to assist at discharge as needed  - Refer to Case Management Department for coordinating discharge planning if the patient needs post-hospital services based on physician/advanced practitioner order or complex needs related to functional status, cognitive ability, or social support system  Outcome: Adequate for Discharge     Problem: METABOLIC, FLUID AND ELECTROLYTES - ADULT  Goal: Electrolytes maintained within normal limits  Description  INTERVENTIONS:  - Monitor labs and assess patient for signs and symptoms of electrolyte imbalances  - Administer electrolyte replacement as ordered  - Monitor response to electrolyte replacements, including repeat lab results as appropriate  - Instruct patient on fluid and nutrition as appropriate  Outcome: Adequate for Discharge

## 2019-12-21 NOTE — PROGRESS NOTES
24 hrs urine had to be completed at 0630, was not informed in report  Pt discharged home, specimen discarded

## 2019-12-21 NOTE — PLAN OF CARE
Problem: Potential for Falls  Goal: Patient will remain free of falls  Description  INTERVENTIONS:  - Assess patient frequently for physical needs  -  Identify cognitive and physical deficits and behaviors that affect risk of falls    -  [place] fall precautions as indicated by assessment   - Educate patient/family on patient safety including physical limitations  - Instruct patient to call for assistance with activity based on assessment  - Modify environment to reduce risk of injury  - Consider OT/PT consult to assist with strengthening/mobility  Outcome: Progressing     Problem: DISCHARGE PLANNING  Goal: Discharge to home or other facility with appropriate resources  Description  INTERVENTIONS:  - Identify barriers to discharge w/patient and caregiver  - Arrange for needed discharge resources and transportation as appropriate  - Identify discharge learning needs (meds, wound care, etc )  - Arrange for interpretive services to assist at discharge as needed  - Refer to Case Management Department for coordinating discharge planning if the patient needs post-hospital services based on physician/advanced practitioner order or complex needs related to functional status, cognitive ability, or social support system  Outcome: Progressing     Problem: METABOLIC, FLUID AND ELECTROLYTES - ADULT  Goal: Electrolytes maintained within normal limits  Description  INTERVENTIONS:  - Monitor labs and assess patient for signs and symptoms of electrolyte imbalances  - Administer electrolyte replacement as ordered  - Monitor response to electrolyte replacements, including repeat lab results as appropriate  - Instruct patient on fluid and nutrition as appropriate  Outcome: Progressing

## 2019-12-21 NOTE — SOCIAL WORK
CM met with Pt to discuss d/c planning  CM discussed a call received from Carly Dotson at the [address], reporting the Shelter is unable to accept her back upon d/c, as they are unable to meet her medical needs  Pt denied being aware the Shelter wasn't able to accept her back  Pt reported she can stay with her father upon d/c who will also pick her up upon d/c today  Pt's RN Waqas Merino is aware of the above

## 2019-12-21 NOTE — PROGRESS NOTES
Progress Note - CHAPARRITA Valenciaio Pickup 20 y o  female MRN: [id number]  Unit/Bed#: [facility] 730-01 Encounter: [id number]    Assessment:  20 y o   at 34w1d admitted with epilepsy s/p multiple grand mal seizures  Hospital day 3  Complicated social history    Plan:  1  Seizure Disorder  -neurology consulted and managing  -CT head negative, EEG at [facility] negative  -s/p 6 g magnesium, now currently on Keppra 500 mg in the morning and 1000 mg at night  -a 24 hours urine protein ordered, has not been collected at this time  Protein to creatinine ratios have been elevated, however patient has not had any elevated blood pressures  2  IUP at 34 weeks   -NST TID  -no obstetrical complaints this morning    3  Anemia  - Hb on admission 9 0-->8 1, continue PO iron supplementation    Subjective/Objective   Chief Complaint:     Subjective: Patient resting comfortably this morning  She reports that she is feeling tired but denies any OB complaints  Contractions: no  Loss of fluid: no  Vaginal bleeding: no  Fetal movement: yes    Pain: no  Tolerating PO: yes  Voiding: yes  Ambulating: yes  Headaches: no  Visual changes: no  Chest pain: no  Shortness of breath: no  Nausea: no  Vomiting/Diarrhea: no  Dysuria: no  Leg pain: no    Objective:     Vitals:   Temp:  [98 3 °F (36 8 °C)-98 7 °F (37 1 °C)] 98 3 °F (36 8 °C)  Resp:  [16] 16  BP: (100-114)/(55-66) 114/66     Physical Exam:     AAOx3, NAD  CV, RRR  CTA b/l, no WRR  Gravid but soft, non-tender, non-distended, no rebound or guarding  Non tender, negative Jenna's bilaterally    Fetal Assessment:  FHT: 140 / Moderate 6 - 25 bpm / +accels, reactive  [place]:  none    Lab, Imaging and other studies: I have personally reviewed pertinent reports        Lab Results   Component Value Date    WBC 10 79 (H) 2019    HGB 8 1 (L) 2019    HCT 26 1 (L) 2019    MCV 85 2019     2019       Lab Results   Component Value Date    GLUCOSE 94 2014 CALCIUM 8 6 12/20/2019     08/26/2014    K 3 6 12/20/2019    CO2 22 12/20/2019     (H) 12/20/2019    BUN 2 (L) 12/20/2019    CREATININE 0 40 (L) 12/20/2019       Lab Results   Component Value Date    ALT 17 12/19/2019    AST 20 12/19/2019    ALKPHOS 121 (H) 12/19/2019    BILITOT 0 34 08/26/2014         Lora Fitzpatrick MD, MPH  OB/GYN, PGY4  12/21/2019, 8:08 AM

## 2019-12-21 NOTE — PLAN OF CARE
Problem: Potential for Falls  Goal: Patient will remain free of falls  Description  INTERVENTIONS:  - Assess patient frequently for physical needs  -  Identify cognitive and physical deficits and behaviors that affect risk of falls  -  [place] fall precautions as indicated by assessment   - Educate patient/family on patient safety including physical limitations  - Instruct patient to call for assistance with activity based on assessment  - Modify environment to reduce risk of injury  - Consider OT/PT consult to assist with strengthening/mobility  12/21/2019 1301 by Torie Levy RN  Outcome: Progressing  12/21/2019 1054 by Torie Levy RN  Outcome: Progressing     Problem: SAFETY ADULT  Goal: Patient will remain free of falls  Description  INTERVENTIONS:  - Assess patient frequently for physical needs  -  Identify cognitive and physical deficits and behaviors that affect risk of falls    -  [place] fall precautions as indicated by assessment   - Educate patient/family on patient safety including physical limitations  - Instruct patient to call for assistance with activity based on assessment  - Modify environment to reduce risk of injury  - Consider OT/PT consult to assist with strengthening/mobility  12/21/2019 1301 by Torie Levy RN  Outcome: Progressing  12/21/2019 1054 by Torie Levy RN  Outcome: Progressing     Problem: DISCHARGE PLANNING  Goal: Discharge to home or other facility with appropriate resources  Description  INTERVENTIONS:  - Identify barriers to discharge w/patient and caregiver  - Arrange for needed discharge resources and transportation as appropriate  - Identify discharge learning needs (meds, wound care, etc )  - Arrange for interpretive services to assist at discharge as needed  - Refer to Case Management Department for coordinating discharge planning if the patient needs post-hospital services based on physician/advanced practitioner order or complex needs related to functional status, cognitive ability, or social support system  12/21/2019 1301 by Jeri Almonte RN  Outcome: Progressing  12/21/2019 1054 by Jeri Almonte RN  Outcome: Progressing     Problem: METABOLIC, FLUID AND ELECTROLYTES - ADULT  Goal: Electrolytes maintained within normal limits  Description  INTERVENTIONS:  - Monitor labs and assess patient for signs and symptoms of electrolyte imbalances  - Administer electrolyte replacement as ordered  - Monitor response to electrolyte replacements, including repeat lab results as appropriate  - Instruct patient on fluid and nutrition as appropriate  12/21/2019 1301 by Jeri Almonte RN  Outcome: Progressing  12/21/2019 1054 by Jeri Almonte RN  Outcome: Progressing

## 2019-12-21 NOTE — DISCHARGE SUMMARY
[facility] Discharge Summary - Medical Romina Garces 20 y o  female MRN: [id number]    [address] [facility] 7 Room / Bed: [facility] 730/[facility] 730-01 Encounter: [id number]    BRIEF OVERVIEW    Admitting Provider: Danika Moran MD  Discharge Provider: Danika Moran MD  Primary Care Physician at Discharge: Dr Dorian Arizmendi    [facility] To[facility] [address] / Family Member Name: Vonnie Hendrix  Phone Number: [phone]    Admission Date: 12/19/2019     Discharge Date: 12/21/19      Primary Discharge Diagnosis  Principal Problem:    Seizure [facility])  Active Problems:    33 weeks gestation of pregnancy    Anemia during pregnancy in third trimester  Resolved Problems:    * No resolved hospital problems  Maximino Pate - Dr Kevin Knight  Neurology - Dr Carl Harmon            Discharge Disposition: Home/Self Care  Discharged With Lines: no    Test Results Pending at Discharge:  None    Outpatient Follow-Up  Follow-up with PCP within 7 days  Follow up with consulting providers  Follow-up with Neurology and OBGYN      Code Status: Level 1 - Full Code  Advance Directive and Living Will: <no information>  Power of :    POLST:      Medications   See after visit summary for reconciled discharge medications provided to patient and family  Allergies  Allergies   Allergen Reactions    Ibuprofen Hives and Rash     Reaction Date: [id number]; Discharge Diet: regular diet  Activity restrictions: no driving, no gym class, no overhead lifting, no repetitive motion and no sports    [address]  Patient is a 20year old female with a past medical history significant for pregnancy of 33 weeks and seizures who presented for evaluation of seizures  CT head was negative for any acute processes  OBGYN evaluated patient had little concern for eclampsia due to normal blood pressures  Patient's Keppra was increased to 500 mg p o   In morning and 1000 mg p o  At night  Patient was also given IV Venofer due to iron deficiency anemia  Patient [facility]  Patient will need to continue iron supplementation  Patient will follow up PCP within 7 days    Presenting Problem/History of Present Illness  Principal Problem:    Seizure ([address] )  Active Problems:    33 weeks gestation of pregnancy    Anemia during pregnancy in third trimester  Resolved Problems:    * No resolved hospital problems  *        Other Pertinent Test Results  None     Discharge Condition: good      Discharge  Statement   I spent 15 minutes minutes discharging the patient  This time was spent on the day of discharge  I had direct contact with the patient on the day of discharge  Additional documentation is required if more than 30 minutes were spent on discharge

## 2019-12-23 NOTE — TELEPHONE ENCOUNTER
Read through Neuro Consult and they spoke with patient about following up with Neurology for further work up  Patient stated she is moving to [place]  Neuro reached out to  to help patient find a Neurologist in [place]  Patient was discharged back to the [facility]

## 2019-12-23 NOTE — UTILIZATION REVIEW
Notification of Discharge  This is a Notification of Discharge from our facility [address]  Please be advised that this patient has been discharge from our facility  Below you will find the admission and discharge date and time including the patients disposition  PRESENTATION DATE: 12/19/2019  6:43 PM    IP ADMISSION DATE: 12/19/19 2226   DISCHARGE DATE: 12/21/2019 12:49 PM  DISPOSITION: Home/Self Care Home/Self Care   Admission Orders listed below:  Admission Orders (From admission, onward)     Ordered        12/19/19 2226  Inpatient Admission  Once                   Please contact the UR Department if additional information is required to close this patient's authorization/case  Tawanda Goemz  Network Utilization Review Department  Main: [phone] x carefully listen to the prompts  All voicemails are confidential   [email or web address]  Send all requests for admission clinical reviews, approved or denied determinations and any other requests to dedicated fax number below belonging to the campus where the patient is receiving treatment   List of dedicated fax numbers:  [address] DENIALS (Administrative/Medical Necessity) [phone]   [address] (Maternity/NICU/Pediatrics) [phone]   Laura Burrows [phone]   Na Yeager [phone]   [facility] [phone]   [facility] 1525 [facility] [phone]   [facility] [phone]   [address] [facility]  [address] [facility] 1000 [address] [facility] [phone]

## 2019-12-26 ENCOUNTER — HOSPITAL ENCOUNTER (EMERGENCY)
Facility: HOSPITAL | Age: 19
Discharge: HOME | End: 2019-12-26
Attending: OBSTETRICS & GYNECOLOGY | Admitting: OBSTETRICS & GYNECOLOGY
Payer: COMMERCIAL

## 2019-12-26 ENCOUNTER — TELEPHONE (OUTPATIENT)
Dept: OBGYN CLINIC | Facility: CLINIC | Age: 19
End: 2019-12-26

## 2019-12-26 VITALS
SYSTOLIC BLOOD PRESSURE: 94 MMHG | TEMPERATURE: 98.4 F | DIASTOLIC BLOOD PRESSURE: 51 MMHG | WEIGHT: 127 LBS | RESPIRATION RATE: 18 BRPM | HEIGHT: 56 IN | OXYGEN SATURATION: 97 % | BODY MASS INDEX: 28.57 KG/M2 | HEART RATE: 118 BPM

## 2019-12-26 PROCEDURE — NC001 PR NO CHARGE: Performed by: OBSTETRICS & GYNECOLOGY

## 2019-12-26 PROCEDURE — 99213 OFFICE O/P EST LOW 20 MIN: CPT

## 2019-12-26 NOTE — UTILIZATION REVIEW
Notification of Discharge  This is a Notification of Discharge from our facility [address]  Please be advised that this patient has been discharge from our facility  Below you will find the admission and discharge date and time including the patients disposition  PRESENTATION DATE: 12/19/2019  6:43 PM    IP ADMISSION DATE: 12/19/19 2226   DISCHARGE DATE: 12/21/2019 12:49 PM  DISPOSITION: Home/Self Care Home/Self Care   Admission Orders listed below:  Admission Orders (From admission, onward)     Ordered        12/19/19 2226  Inpatient Admission  Once                   Please contact the UR Department if additional information is required to close this patient's authorization/case  [facility] Util[facility] Review Department  Main: [phone] x carefully listen to the prompts  All voicemails are confidential   [email or web address]  Send all requests for admission clinical reviews, approved or denied determinations and any other requests to dedicated fax number below belonging to the campus where the patient is receiving treatment   List of dedicated fax numbers:  [address] DENIALS (Administrative/Medical Necessity) [phone]   [address] (Maternity/NICU/Pediatrics) [phone]   [place] [phone]   [facility] [phone]   [facility] [phone]   [facility] [place] Luis Enrique [address] [facility] [phone]   [facility]  [phone]   220[address] [facility]  2401 [facility] 1000 [address] [facility] [phone]
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
## 2019-12-26 NOTE — DISCHARGE INSTRUCTIONS
Pregnancy at 31 to 34 [address]:   You may continue to have symptoms such as shortness of breath, heartburn, contractions, or swelling of your ankles and feet  You may be gaining about 1 pound a week now  DISCHARGE INSTRUCTIONS:   Seek care immediately if:   · You develop a severe headache that does not go away  · You have new or increased vision changes, such as blurred or spotted vision  · You have new or increased swelling in your face or hands  · You have vaginal spotting or bleeding  · Your water broke or you feel warm water gushing or trickling from your vagina  Contact your healthcare provider if:   · You have more than 5 contractions in 1 hour  · You notice any changes in your baby's movements  · You have abdominal cramps, pressure, or tightening  · You have a change in vaginal discharge  · You have chills or a fever  · You have vaginal itching, burning, or pain  · You have yellow, green, white, or foul-smelling vaginal discharge  · You have pain or burning when you urinate, less urine than usual, or pink or bloody urine  · You have questions or concerns about your condition or care  How to care for yourself at this stage of your pregnancy:   · Eat a variety of healthy foods  Healthy foods include fruits, vegetables, whole-grain breads, low-fat dairy foods, beans, lean meats, and fish  Drink liquids as directed  Ask how much liquid to drink each day and which liquids are best for you  Limit caffeine to less than 200 milligrams each day  Limit your intake of fish to 2 servings each week  Choose fish low in mercury such as canned light tuna, shrimp, salmon, cod, or tilapia  Do not  eat fish high in mercury such as swordfish, tilefish, jackie mackerel, and shark  · Manage heartburn  by eating 4 or 5 small meals each day instead of large meals  Avoid spicy food  · Manage swelling  by lying down and putting your feet up       · Take prenatal vitamins as directed  Your need for certain vitamins and minerals, such as folic acid, increases during pregnancy  Prenatal vitamins provide some of the extra vitamins and minerals you need  Prenatal vitamins may also help to decrease the risk of certain birth defects  · Talk to your healthcare provider about exercise  Moderate exercise can help you stay fit  Your healthcare provider will help you plan an exercise program that is safe for you during pregnancy  · Do not smoke  If you smoke, it is never too late to quit  Smoking increases your risk of a miscarriage and other health problems during your pregnancy  Smoking can cause your baby to be born too early or weigh less at birth  Ask your healthcare provider for information if you need help quitting  · Do not drink alcohol  Alcohol passes from your body to your baby through the placenta  It can affect your baby's brain development and cause fetal alcohol syndrome (FAS)  FAS is a group of conditions that causes mental, behavior, and growth problems  · Talk to your healthcare provider before you take any medicines  Many medicines may harm your baby if you take them when you are pregnant  Do not take any medicines, vitamins, herbs, or supplements without first talking to your healthcare provider  Never use illegal or street drugs (such as marijuana or cocaine) while you are pregnant  Safety tips during pregnancy:   · Avoid hot tubs and saunas  Do not use a hot tub or sauna while you are pregnant, especially during your first trimester  Hot tubs and saunas may raise your baby's temperature and increase the risk of birth defects  · Avoid toxoplasmosis  This is an infection caused by eating raw meat or being around infected cat feces  It can cause birth defects, miscarriages, and other problems  Wash your hands after you touch raw meat  Make sure any meat is well-cooked before you eat it  Avoid raw eggs and unpasteurized milk   Use gloves or ask someone else to clean your cat's litter box while you are pregnant  Changes that are happening with your baby:  By 34 weeks, your baby may weigh more than 5 pounds  Your baby will be about 12 ½ inches long from the top of the head to the rump (baby's bottom)  Your baby is gaining about ½ pound a week  Your baby's eyes open and close now  Your baby's kicks and movements are more forceful at this time  What you need to know about prenatal care: Your healthcare provider will check your blood pressure and weight  You may also need the following:  · A urine test  may also be done to check for sugar and protein  These can be signs of gestational diabetes or infection  Protein in your urine may also be a sign of preeclampsia  Preeclampsia is a condition that can develop during week 20 or later of your pregnancy  It causes high blood pressure, and it can cause problems with your kidneys and other organs  · A Tdap vaccine  may be recommended by your healthcare provider  · Fundal height  is a measurement of your uterus to check your baby's growth  This number is usually the same as the number of weeks that you have been pregnant  Your healthcare provider may also check your baby's position  · Your baby's heart rate  will be checked  © 2017 [address] Information is for End User's use only and may not be sold, redistributed or otherwise used for commercial purposes  All illustrations and images included in CareNotes® are the copyrighted property of [employer]  or [employer]  The above information is an  only  It is not intended as medical advice for individual conditions or treatments  Talk to your doctor, nurse or pharmacist before following any medical regimen to see if it is safe and effective for you
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
## 2019-12-27 ENCOUNTER — TELEPHONE (OUTPATIENT)
Dept: OBGYN CLINIC | Facility: CLINIC | Age: 19
End: 2019-12-27

## 2019-12-28 ENCOUNTER — HOSPITAL ENCOUNTER (OUTPATIENT)
Facility: HOSPITAL | Age: 19
Setting detail: OBSERVATION
Discharge: HOME | End: 2019-12-28
Attending: EMERGENCY MEDICINE | Admitting: OBSTETRICS & GYNECOLOGY
Payer: COMMERCIAL

## 2019-12-28 VITALS
DIASTOLIC BLOOD PRESSURE: 56 MMHG | HEIGHT: 56 IN | HEART RATE: 113 BPM | OXYGEN SATURATION: 98 % | RESPIRATION RATE: 16 BRPM | WEIGHT: 132 LBS | SYSTOLIC BLOOD PRESSURE: 97 MMHG | BODY MASS INDEX: 29.69 KG/M2 | TEMPERATURE: 98 F

## 2019-12-28 DIAGNOSIS — Z3A.35 35 WEEKS GESTATION OF PREGNANCY: ICD-10-CM

## 2019-12-28 DIAGNOSIS — Z87.898 HISTORY OF DOMESTIC VIOLENCE: ICD-10-CM

## 2019-12-28 DIAGNOSIS — R56.9 SEIZURE (HCC): Primary | ICD-10-CM

## 2019-12-28 LAB
ALBUMIN SERPL BCP-MCNC: 2.5 G/DL (ref 3.5–5)
ALP SERPL-CCNC: 131 U/L (ref 46–116)
ALT SERPL W P-5'-P-CCNC: 23 U/L (ref 12–78)
AMPHETAMINES SERPL QL SCN: NEGATIVE
ANION GAP SERPL CALCULATED.3IONS-SCNC: 6 MMOL/L (ref 4–13)
AST SERPL W P-5'-P-CCNC: 27 U/L (ref 5–45)
BARBITURATES UR QL: NEGATIVE
BASOPHILS # BLD AUTO: 0.03 THOUSANDS/ΜL (ref 0–0.1)
BASOPHILS NFR BLD AUTO: 0 % (ref 0–1)
BENZODIAZ UR QL: NEGATIVE
BILIRUB SERPL-MCNC: 0.22 MG/DL (ref 0.2–1)
BUN SERPL-MCNC: 4 MG/DL (ref 5–25)
CALCIUM SERPL-MCNC: 9.6 MG/DL (ref 8.3–10.1)
CHLORIDE SERPL-SCNC: 110 MMOL/L (ref 100–108)
CO2 SERPL-SCNC: 25 MMOL/L (ref 21–32)
COCAINE UR QL: NEGATIVE
CREAT SERPL-MCNC: 0.46 MG/DL (ref 0.6–1.3)
EOSINOPHIL # BLD AUTO: 0.08 THOUSAND/ΜL (ref 0–0.61)
EOSINOPHIL NFR BLD AUTO: 1 % (ref 0–6)
ERYTHROCYTE [DISTWIDTH] IN BLOOD BY AUTOMATED COUNT: 15.6 % (ref 11.6–15.1)
GFR SERPL CREATININE-BSD FRML MDRD: 144 ML/MIN/1.73SQ M
GLUCOSE SERPL-MCNC: 79 MG/DL (ref 65–140)
HCT VFR BLD AUTO: 28.6 % (ref 34.8–46.1)
HGB BLD-MCNC: 9 G/DL (ref 11.5–15.4)
IMM GRANULOCYTES # BLD AUTO: 0.1 THOUSAND/UL (ref 0–0.2)
IMM GRANULOCYTES NFR BLD AUTO: 1 % (ref 0–2)
LYMPHOCYTES # BLD AUTO: 2.24 THOUSANDS/ΜL (ref 0.6–4.47)
LYMPHOCYTES NFR BLD AUTO: 26 % (ref 14–44)
MCH RBC QN AUTO: 26.6 PG (ref 26.8–34.3)
MCHC RBC AUTO-ENTMCNC: 31.5 G/DL (ref 31.4–37.4)
MCV RBC AUTO: 85 FL (ref 82–98)
METHADONE UR QL: NEGATIVE
MONOCYTES # BLD AUTO: 1.17 THOUSAND/ΜL (ref 0.17–1.22)
MONOCYTES NFR BLD AUTO: 13 % (ref 4–12)
NEUTROPHILS # BLD AUTO: 5.09 THOUSANDS/ΜL (ref 1.85–7.62)
NEUTS SEG NFR BLD AUTO: 59 % (ref 43–75)
NRBC BLD AUTO-RTO: 0 /100 WBCS
OPIATES UR QL SCN: NEGATIVE
PCP UR QL: NEGATIVE
PLATELET # BLD AUTO: 228 THOUSANDS/UL (ref 149–390)
PMV BLD AUTO: 10.3 FL (ref 8.9–12.7)
POTASSIUM SERPL-SCNC: 3.5 MMOL/L (ref 3.5–5.3)
PROT SERPL-MCNC: 7 G/DL (ref 6.4–8.2)
RBC # BLD AUTO: 3.38 MILLION/UL (ref 3.81–5.12)
SODIUM SERPL-SCNC: 141 MMOL/L (ref 136–145)
THC UR QL: NEGATIVE
WBC # BLD AUTO: 8.71 THOUSAND/UL (ref 4.31–10.16)

## 2019-12-28 PROCEDURE — 80053 COMPREHEN METABOLIC PANEL: CPT | Performed by: EMERGENCY MEDICINE

## 2019-12-28 PROCEDURE — NC001 PR NO CHARGE: Performed by: OBSTETRICS & GYNECOLOGY

## 2019-12-28 PROCEDURE — 99215 OFFICE O/P EST HI 40 MIN: CPT

## 2019-12-28 PROCEDURE — 99243 OFF/OP CNSLTJ NEW/EST LOW 30: CPT | Performed by: OBSTETRICS & GYNECOLOGY

## 2019-12-28 PROCEDURE — 59025 FETAL NON-STRESS TEST: CPT | Performed by: OBSTETRICS & GYNECOLOGY

## 2019-12-28 PROCEDURE — 80177 DRUG SCRN QUAN LEVETIRACETAM: CPT | Performed by: OBSTETRICS & GYNECOLOGY

## 2019-12-28 PROCEDURE — 76816 OB US FOLLOW-UP PER FETUS: CPT | Performed by: OBSTETRICS & GYNECOLOGY

## 2019-12-28 PROCEDURE — 99285 EMERGENCY DEPT VISIT HI MDM: CPT | Performed by: EMERGENCY MEDICINE

## 2019-12-28 PROCEDURE — 99244 OFF/OP CNSLTJ NEW/EST MOD 40: CPT | Performed by: PSYCHIATRY & NEUROLOGY

## 2019-12-28 PROCEDURE — 85025 COMPLETE CBC W/AUTO DIFF WBC: CPT | Performed by: EMERGENCY MEDICINE

## 2019-12-28 PROCEDURE — 36415 COLL VENOUS BLD VENIPUNCTURE: CPT | Performed by: EMERGENCY MEDICINE

## 2019-12-28 PROCEDURE — 99285 EMERGENCY DEPT VISIT HI MDM: CPT

## 2019-12-28 PROCEDURE — 80307 DRUG TEST PRSMV CHEM ANLYZR: CPT | Performed by: OBSTETRICS & GYNECOLOGY

## 2019-12-28 RX ORDER — LEVETIRACETAM 500 MG/1
500 TABLET ORAL
Status: DISCONTINUED | OUTPATIENT
Start: 2019-12-28 | End: 2019-12-28 | Stop reason: HOSPADM

## 2019-12-28 RX ORDER — MAGNESIUM SULFATE HEPTAHYDRATE 40 MG/ML
4 INJECTION, SOLUTION INTRAVENOUS ONCE
Status: DISCONTINUED | OUTPATIENT
Start: 2019-12-28 | End: 2019-12-28

## 2019-12-28 RX ORDER — LEVETIRACETAM 500 MG/1
TABLET ORAL
Qty: 120 TABLET | Refills: 0 | Status: SHIPPED | OUTPATIENT
Start: 2019-12-28 | End: 2020-01-28 | Stop reason: HOSPADM

## 2019-12-28 RX ORDER — ACETAMINOPHEN 325 MG/1
650 TABLET ORAL EVERY 4 HOURS PRN
Status: DISCONTINUED | OUTPATIENT
Start: 2019-12-28 | End: 2019-12-28 | Stop reason: HOSPADM

## 2019-12-28 RX ORDER — LEVETIRACETAM 500 MG/1
1000 TABLET ORAL ONCE
Status: COMPLETED | OUTPATIENT
Start: 2019-12-28 | End: 2019-12-28

## 2019-12-28 RX ADMIN — LEVETIRACETAM 500 MG: 500 TABLET, FILM COATED ORAL at 13:54

## 2019-12-28 RX ADMIN — LEVETIRACETAM 500 MG: 500 TABLET, FILM COATED ORAL at 11:21

## 2019-12-28 NOTE — PROGRESS NOTES
L&D Triage Note - OB/GYN  Willard Connelyl 20 y o  female MRN: [id number]  Unit/Bed#: LD Triage 3-01 Encounter: [id number]      Assessment:  20 y o   at 35w1d with seizure activity last night witnessed by her sister - suspect taking incorrect dose of medication    Plan:  1  Awaiting neurology input;  Keppra level pending      ______________________________________________________________________      Chief Compliant: seizure activity at home    TIME: 003  Subjective:  20 y o  Patt Muñoz at 35w1d was brought to triage via ambulance after her sister witness her having 2 seizures - first was an absent stare for less than one minute folllowed by the patient coming to and crying out "call an ambulance" and then twitching all over her body for less than one minute  Patient reports that since she was discharged from the hospital 2 days ago, she has take 1 Keppra tablet in the morning and 1 tablet 12 hours later  Social Situation:  Currently residing with her father, his wife,  Patient's 23yo sister and a younger sibling from her father's second marriage  Patient states she feels very safe at home, sister states the same  FOB currently incarcerated      Objective:  Vitals:    19 0900   BP: 97/56   Pulse: (!) 113   Resp:    Temp:    SpO2:        FHT:  Baseline 130 / Moderate 6 - 25 bpm / accels present, decels absent  [place]: irregular          Tesfaye Germain MD 2019 1:27 PM

## 2019-12-28 NOTE — NURSING NOTE
Pts iv was discontinued in left hand  Catheter intact and pressure dressing applied   Pt tolerated removal
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
## 2019-12-28 NOTE — NURSING NOTE
Per Pt takes Keppra at 1100 am and 11pm  Dr Jeff Mejia aware  Keppra dose changed to maintain pt schedule  Keppra level  changed r/t this schedule as needs to be drawn immediately prior to next dose

## 2019-12-28 NOTE — UTILIZATION REVIEW
Initial Clinical Review    Admission: Date/Time/Statement:   19 @ 0440  Orders Placed This Encounter   Procedures    Place in Observation     Standing Status:   Standing     Number of Occurrences:   1     Order Specific Question:   Admitting Physician     Answer:   Eusebio Lockwood     Order Specific Question:   Level of Care     Answer:   Med Surg [16]     ED Arrival Information     Expected Arrival Acuity Means of Arrival Escorted By Service Admission Type    - 2019 02:09 [facility]N Elective    Arrival Complaint    Seizure        Chief Complaint   Patient presents with    Seizure - Pregnant     pt brought in by ems pt had a seizure tonight pt is 35 weeks gravid      Assessment/Plan:  20 y o   female with an MICHAEL of 2020, by Last Menstrual Period at 35w1d gestation who is being evaluated for ongoing evaluation of seizure disorder  Patient has hurt presented to labor delivery triage multiple times throughout the last 4 weeks of this current gestation in the setting of seizures witnessed at home  At time of previous admission, patient was evaluated by Neurology for said seizures, started a course of Keppra 500 mg daily and 1000 mg nightly, underwent unremarkable head CT, with plan for outpatient follow-up with Neurology for ongoing management of seizure disorder  She declined EEG during prior hospital encounter  She presents with her sister tonight after her sister witnessed multiple seizure episodes at home, concern for ongoing will begin the pregnancy     Patient reports intermittent seizures throughout the night, stating that she will occasionally have her vision going black, "freeze" and they come back to her normal state  She is adamant that she continues to take her Keppra as prescribed    During previous assessments, he claimed to seizure has been ruled out in the setting of normotensive blood pressures and unremarkable preeclamptic labs, as the patient had never had an elevated blood pressure throughout her prenatal course        ED Triage Vitals   Temperature Pulse Respirations Blood Pressure SpO2   12/28/19 0144 12/28/19 0144 12/28/19 0144 12/28/19 0144 12/28/19 0144   (!) 97 4 °F (36 3 °C) 100 18 114/62 98 %      Temp Source Heart Rate Source Patient Position - Orthostatic VS BP Location FiO2 (%)   12/28/19 0144 12/28/19 0144 12/28/19 0218 12/28/19 0218 --   Oral Monitor Lying Right arm       Pain Score       12/28/19 0145       No Pain        Wt Readings from Last 1 Encounters:   12/28/19 59 9 kg (132 lb)     Additional Vital Signs:   Date/Time  Temp  Pulse  Resp  BP  SpO2  O2 Device  Patient Position - Orthostatic VS   12/28/19 1000                 Comment rows:   OBSERV: spoke with Neuro re consult and they'll be here to eval her this afternoon at 12/28/19 1000   12/28/19 0900    113Abnormal     97/56         12/28/19 0218  98 °F (36 7 °C)  102  16  103/57      Lying   12/28/19 0214                 Comment rows:   OBSERV: pt presented from the ER s/p seizure For EFm and evaluation at 12/28/19 0214       Pertinent Labs/Diagnostic Test Results:   Results from last 7 days   Lab Units 12/28/19  0156   WBC Thousand/uL 8 71   HEMOGLOBIN g/dL 9 0*   HEMATOCRIT % 28 6*   PLATELETS Thousands/uL 228   NEUTROS ABS Thousands/µL 5 09         Results from last 7 days   Lab Units 12/28/19  0156   SODIUM mmol/L 141   POTASSIUM mmol/L 3 5   CHLORIDE mmol/L 110*   CO2 mmol/L 25   ANION GAP mmol/L 6   BUN mg/dL 4*   CREATININE mg/dL 0 46*   EGFR ml/min/1 73sq m 144   CALCIUM mg/dL 9 6     Results from last 7 days   Lab Units 12/28/19  0156   AST U/L 27   ALT U/L 23   ALK PHOS U/L 131*   TOTAL PROTEIN g/dL 7 0   ALBUMIN g/dL 2 5*   TOTAL BILIRUBIN mg/dL 0 22         Results from last 7 days   Lab Units 12/28/19  0156   GLUCOSE RANDOM mg/dL 79       Results from last 7 days   Lab Units 12/28/19  0534   AMPH/METH  Negative   BARBITURATE UR Negative   BENZODIAZEPINE UR  Negative   COCAINE UR  Negative   METHADONE URINE  Negative   OPIATE UR  Negative   PCP UR  Negative   THC UR  Negative         ED Treatment:   Medication Administration from 12/28/2019 0140 to 12/28/2019 0209       Date/Time Order Dose Route Action Action by Comments     12/28/2019 0200 magnesium sulfate 4 g/100 mL IVPB (premix) 4 g 4 g Intravenous Not Given Brandie Villasenor RN         Past Medical History:   Diagnosis Date    [facility]     Varicella      Present on Admission:  **None**      Admitting Diagnosis: Seizure ([facility] ) [R56 9]  35 weeks gestation of pregnancy [Z3A 35]  Age/Sex: 20 y o  female  Admission Orders:  Scheduled Medications:    Medications:  levETIRAcetam 1,000 mg Oral Once   levETIRAcetam 500 mg Oral Early Morning     Continuous IV Infusions:     PRN Meds:    acetaminophen 650 mg Oral Q4H PRN       IP CONSULT TO NEUROLOGY    Network Utilization Review Department  [email or web address]  ATTENTION: Please call with any questions or concerns to [phone] and carefully listen to the prompts so that you are directed to the right person  All voicemails are confidential   Ramon Guallpa all requests for admission clinical reviews, approved or denied determinations and any other requests to dedicated fax number below belonging to the campus where the patient is receiving treatment   List of dedicated fax numbers for the Facilities:  FACILITY NAME UR FAX NUMBER   ADMISSION DENIALS (Administrative/Medical Necessity) [phone]   [address] [place] [address] (Maternity/NICU/Pediatrics) [phone]   Ayanna Rodriguez [phone]   [facility] [phone]   Joel Quiroz [phone]   Mari Jaimes [address] [phone]   [facility] [address] [phone]   Tiffany Dawson [address] [phone]
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
## 2019-12-29 ENCOUNTER — HOSPITAL ENCOUNTER (OUTPATIENT)
Facility: HOSPITAL | Age: 19
Discharge: HOME | End: 2019-12-30
Attending: OBSTETRICS & GYNECOLOGY | Admitting: OBSTETRICS & GYNECOLOGY
Payer: COMMERCIAL

## 2019-12-29 VITALS
RESPIRATION RATE: 20 BRPM | SYSTOLIC BLOOD PRESSURE: 107 MMHG | DIASTOLIC BLOOD PRESSURE: 66 MMHG | TEMPERATURE: 97.8 F | OXYGEN SATURATION: 97 % | HEART RATE: 108 BPM

## 2019-12-29 RX ADMIN — SODIUM CHLORIDE, SODIUM LACTATE, POTASSIUM CHLORIDE, AND CALCIUM CHLORIDE 125 ML/HR: .6; .31; .03; .02 INJECTION, SOLUTION INTRAVENOUS at 23:15

## 2019-12-30 ENCOUNTER — ROUTINE PRENATAL (OUTPATIENT)
Dept: OBGYN CLINIC | Facility: CLINIC | Age: 19
End: 2019-12-30

## 2019-12-30 VITALS
HEART RATE: 109 BPM | BODY MASS INDEX: 28.79 KG/M2 | DIASTOLIC BLOOD PRESSURE: 61 MMHG | WEIGHT: 128 LBS | HEIGHT: 56 IN | SYSTOLIC BLOOD PRESSURE: 92 MMHG

## 2019-12-30 DIAGNOSIS — G40.909 SEIZURE DISORDER (HCC): Primary | ICD-10-CM

## 2019-12-30 PROBLEM — R56.9 SEIZURES (HCC): Status: ACTIVE | Noted: 2019-12-06

## 2019-12-30 PROBLEM — O09.90 HIGH-RISK PREGNANCY: Status: ACTIVE | Noted: 2019-12-06

## 2019-12-30 PROCEDURE — 99214 OFFICE O/P EST MOD 30 MIN: CPT | Performed by: OBSTETRICS & GYNECOLOGY

## 2019-12-30 PROCEDURE — 99214 OFFICE O/P EST MOD 30 MIN: CPT

## 2019-12-30 PROCEDURE — 76815 OB US LIMITED FETUS(S): CPT | Performed by: OBSTETRICS & GYNECOLOGY

## 2019-12-30 RX ORDER — HYDROXYZINE HYDROCHLORIDE 25 MG/1
25 TABLET, FILM COATED ORAL ONCE
Status: COMPLETED | OUTPATIENT
Start: 2019-12-30 | End: 2019-12-30

## 2019-12-30 RX ORDER — SODIUM CHLORIDE, SODIUM LACTATE, POTASSIUM CHLORIDE, CALCIUM CHLORIDE 600; 310; 30; 20 MG/100ML; MG/100ML; MG/100ML; MG/100ML
125 INJECTION, SOLUTION INTRAVENOUS ONCE
Status: COMPLETED | OUTPATIENT
Start: 2019-12-30 | End: 2019-12-30

## 2019-12-30 RX ORDER — LEVETIRACETAM 500 MG/1
500 TABLET ORAL EVERY 12 HOURS SCHEDULED
Qty: 2 TABLET | Refills: 0 | Status: SHIPPED | OUTPATIENT
Start: 2019-12-30 | End: 2020-01-20 | Stop reason: HOSPADM

## 2019-12-30 RX ADMIN — HYDROXYZINE HYDROCHLORIDE 25 MG: 25 TABLET, FILM COATED ORAL at 00:38

## 2019-12-30 NOTE — QUICK NOTE
Patient was able to sleep all night, no evidence of seizure activity  She has been seen by Dr Kalie Mon ([place]) and can be discharged when she wakes up  She has an appointment at [facility] today at 1300      Milli Danielson MD  OBNIKO, PGY-3  12/30/2019  6:55 AM
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
## 2019-12-30 NOTE — PATIENT INSTRUCTIONS
Pregnancy at 28 to [address]:   You are considered full term at the beginning of 37 weeks  Your breathing may be easier if your baby has moved down into a head-down position  You may need to urinate more often because the baby may be pressing on your bladder  You may also feel more discomfort and get tired easily  DISCHARGE INSTRUCTIONS:   Seek care immediately if:   · You develop a severe headache that does not go away  · You have new or increased vision changes, such as blurred or spotted vision  · You have new or increased swelling in your face or hands  · You have vaginal spotting or bleeding  · Your water broke or you feel warm water gushing or trickling from your vagina  Contact your healthcare provider if:   · You have more than 5 contractions in 1 hour  · You notice any changes in your baby's movements  · You have abdominal cramps, pressure, or tightening  · You have a change in vaginal discharge  · You have chills or a fever  · You have vaginal itching, burning, or pain  · You have yellow, green, white, or foul-smelling vaginal discharge  · You have pain or burning when you urinate, less urine than usual, or pink or bloody urine  · You have questions or concerns about your condition or care  How to care for yourself at this stage of your pregnancy:   · Eat a variety of healthy foods  Healthy foods include fruits, vegetables, whole-grain breads, low-fat dairy foods, beans, lean meats, and fish  Drink liquids as directed  Ask how much liquid to drink each day and which liquids are best for you  Limit caffeine to less than 200 milligrams each day  Limit your intake of fish to 2 servings each week  Choose fish low in mercury such as canned light tuna, shrimp, salmon, cod, or tilapia  Do not  eat fish high in mercury such as swordfish, tilefish, jackie mackerel, and shark  · Take prenatal vitamins as directed    Your need for certain vitamins and minerals, such as folic acid, increases during pregnancy  Prenatal vitamins provide some of the extra vitamins and minerals you need  Prenatal vitamins may also help to decrease the risk of certain birth defects  · Rest as needed  Put your feet up if you have swelling in your ankles and feet  · Do not smoke  If you smoke, it is never too late to quit  Smoking increases your risk of a miscarriage and other health problems during your pregnancy  Smoking can cause your baby to be born too early or weigh less at birth  Ask your healthcare provider for information if you need help quitting  · Do not drink alcohol  Alcohol passes from your body to your baby through the placenta  It can affect your baby's brain development and cause fetal alcohol syndrome (FAS)  FAS is a group of conditions that causes mental, behavior, and growth problems  · Talk to your healthcare provider before you take any medicines  Many medicines may harm your baby if you take them when you are pregnant  Do not take any medicines, vitamins, herbs, or supplements without first talking to your healthcare provider  Never use illegal or street drugs (such as marijuana or cocaine) while you are pregnant  · Talk to your healthcare provider before you travel  You may not be able to travel in an airplane after 36 weeks  He may also recommend that you avoid long road trips  Safety tips:   · Avoid hot tubs and saunas  Do not use a hot tub or sauna while you are pregnant, especially during your first trimester  Hot tubs and saunas may raise your baby's temperature and increase the risk of birth defects  · Avoid toxoplasmosis  This is an infection caused by eating raw meat or being around infected cat feces  It can cause birth defects, miscarriages, and other problems  Wash your hands after you touch raw meat  Make sure any meat is well-cooked before you eat it  Avoid raw eggs and unpasteurized milk   Use gloves or ask someone else to clean your cat's litter box while you are pregnant  · Ask your healthcare provider about travel  The most comfortable time to travel is during the second trimester  Ask your healthcare provider if you can travel after 36 weeks  You may not be able to travel in an airplane after 36 weeks  He may also recommend that you avoid long road trips  Changes that are happening with your baby:  By 38 weeks, your baby may weigh between 6 and 9 pounds  Your baby may be about 14 inches long from the top of the head to the rump (baby's bottom)  Your baby hears well enough to know your voice  As your baby gets larger, you may feel fewer kicks and more stretching and rolling  Your baby may move into a head-down position  Your baby will also rest lower in your abdomen  What you need to know about prenatal care: Your healthcare provider will check your blood pressure and weight  You may also need the following:  · A urine test  may also be done to check for sugar and protein  These can be signs of gestational diabetes or infection  Protein in your urine may also be a sign of preeclampsia  Preeclampsia is a condition that can develop during week 20 or later of your pregnancy  It causes high blood pressure, and it can cause problems with your kidneys and other organs  · A blood test  may be done to check for anemia (low iron level)  · A Tdap vaccine  may be recommended by your healthcare provider  · A group B strep test  is a test that is done to check for group B strep infection  Group B strep is a type of bacteria that may be found in the vagina or rectum  It can be passed to your baby during delivery if you have it  Your healthcare provider will take swab your vagina or rectum and send the sample to the lab for tests  · Fundal height  is a measurement of your uterus to check your baby's growth  This number is usually the same as the number of weeks that you have been pregnant   Your healthcare provider may also check your baby's position  · Your baby's heart rate  will be checked  © 2017 [address] Information is for End User's use only and may not be sold, redistributed or otherwise used for commercial purposes  All illustrations and images included in CareNotes® are the copyrighted property of [employer] M [employer]  or [employer]  The above information is an  only  It is not intended as medical advice for individual conditions or treatments  Talk to your doctor, nurse or pharmacist before following any medical regimen to see if it is safe and effective for you

## 2019-12-30 NOTE — PROCEDURES
Arcadio Cadet, a  at 35w3d with an MICHAEL of 2020, by Last Menstrual Period, was seen at [facility] for the following procedure(s): $Procedure Type: AEMLIA]    Nonstress Test  Reason for NST: Other (Comment)  Variability: Moderate  Decelerations: None  Accelerations: Yes  Acoustic Stimulator: No  Baseline: 135 BPM  Uterine Irritability: No  Contractions: Irregular    4 Quadrant AMELIA  AMELIA Q1 (cm): 3 2 cm  AMELIA Q2 (cm): 2 1 cm  AMELIA Q3 (cm): 1 9 cm  AMELIA Q4 (cm): 1 6 cm  AMELIA TOTAL (cm): 8 8 cm         Ultrasound Other  Fetal Presentation: Vertex    Interpretation  Nonstress Test Interpretation: Reactive  Overall Impression: Wendy Campo MD  19  2:21 AM

## 2019-12-31 NOTE — PROGRESS NOTES
OB/GYN prenatal visit    S: 20 y o  Mela Velazquez 35w4d here for PN visit  She has no obstetric complaints, including pelvic pain, contractions, vaginal bleeding, loss of fluid, or decreased fetal movement  However, patient has extreme anxiety and concerns about going into labor and having her contraction pain induce is see chart  She is requesting  delivery at 36 weeks, and states that someone told her that she could have that  There is no documentation of that being told her anywhere in her medical record  To take she was counseled on her lack of medical indication for  delivery, and the risks of  delivery in the absence of medical indication  She was counseled on options for route of delivery at 39 weeks gestation electively  Patient initially expressed a desire to deliver by  at 43 weeks  After extensive discussion, patient is considering induction of labor at 39 weeks  Patient will be seen twice weekly until completion of her pregnancy due to her concerns about her seizures, and to discuss options for delivery  Of note, there concerns about psychogenic versus organic seizures  She has a history of one seizure in childhood  Patient recalls all the events of the seizure and circumstances around which it occurred  She never has any loss of control over her bowels or urination  No seizures have been witness by health professionals and evaluation at [facility] including EEG was negative for abnormalities   Counseled on need to call 911 and not be alone if she is concerned about seizure activity and to keep up with follow up with her neurologist     O:  Vitals:    19 1200   BP: 92/61   Pulse: (!) 109       Gen: no acute distress, nonlabored breathing  Fundal height:  33 cm  FHR: 140          A/P:    Problem List Items Addressed This Visit     None      Visit Diagnoses     [facility] ([facility] )    -  Primary    Relevant Medications    levETIRAcetam (KEPPRA) 1000 mg BID        RTO twice weekly until delivery due to concerns regarding anxiety and seizure disorder  GBS at 36 weeks  Countinued counseling regarding route of delivery    Reassurance of lack of indication for early delivery, counseling regarding risks of rpeterm delivery, counseling regarding delivery options, and answering all patient questions to her satisfaction including coordination of care comprised a 45 minute visit        Ross Kruse MD  12/31/2019  4:59 PM

## 2020-01-02 LAB — LEVETIRACETAM SERPL-MCNC: 2.6 UG/ML (ref 10–40)

## 2020-01-06 ENCOUNTER — ROUTINE PRENATAL (OUTPATIENT)
Dept: OBGYN CLINIC | Facility: CLINIC | Age: 20
End: 2020-01-06

## 2020-01-06 ENCOUNTER — PATIENT OUTREACH (OUTPATIENT)
Dept: OBGYN CLINIC | Facility: CLINIC | Age: 20
End: 2020-01-06

## 2020-01-06 VITALS
WEIGHT: 131 LBS | DIASTOLIC BLOOD PRESSURE: 52 MMHG | HEIGHT: 56 IN | SYSTOLIC BLOOD PRESSURE: 106 MMHG | BODY MASS INDEX: 29.47 KG/M2 | HEART RATE: 106 BPM

## 2020-01-06 DIAGNOSIS — R56.9 SEIZURES (HCC): ICD-10-CM

## 2020-01-06 DIAGNOSIS — Z3A.36 36 WEEKS GESTATION OF PREGNANCY: Primary | ICD-10-CM

## 2020-01-06 PROCEDURE — 99213 OFFICE O/P EST LOW 20 MIN: CPT | Performed by: OBSTETRICS & GYNECOLOGY

## 2020-01-06 NOTE — PROGRESS NOTES
OB/GYN prenatal visit    S: 20 y o  Eddie Helio 36w3d here for PN visit  She has no obstetric complaints, including pelvic pain, contractions, vaginal bleeding, loss of fluid, or decreased fetal movement  Discussed route of delivery  Patient desires primary elective   Risks and benefits discussed including but not limited to bleeding, damage to nearby organs including bowel and bladder, infection, abnormal placentation, increased risks of complication and rupture in subsequent pregnancies      O:  Vitals:    20 0952   BP: 106/52   Pulse: (!) 106       Gen: no acute distress, nonlabored breathing  FH: 36cm  FHT: 150          A/P:    Problem List Items Addressed This Visit     None      36 week gestations:  Desires Depo for contraception  Plan for primary elective   Needs GBS collected  Please get pulsox on next visit    Krystyna Westbrook MD  2020  12:55 PM

## 2020-01-06 NOTE — PROGRESS NOTES
SW met with Pt during prenatal visit for follow up  Pt reported she has decided to stay in [place] until delivery  Pt is staying with her dad  States has needed items  Pt will get records to establish care in [place]  Pt denies other concern  SW will f/u as needed

## 2020-01-06 NOTE — PATIENT INSTRUCTIONS
Pregnancy at [address] to [address]:   You are considered full term at the beginning of 37 weeks  Your breathing may be easier if your baby has moved down into a head-down position  You may need to urinate more often because the baby may be pressing on your bladder  You may also feel more discomfort and get tired easily  DISCHARGE INSTRUCTIONS:   Seek care immediately if:   · You develop a severe headache that does not go away  · You have new or increased vision changes, such as blurred or spotted vision  · You have new or increased swelling in your face or hands  · You have vaginal spotting or bleeding  · Your water broke or you feel warm water gushing or trickling from your vagina  Contact your healthcare provider if:   · You have more than 5 contractions in 1 hour  · You notice any changes in your baby's movements  · You have abdominal cramps, pressure, or tightening  · You have a change in vaginal discharge  · You have chills or a fever  · You have vaginal itching, burning, or pain  · You have yellow, green, white, or foul-smelling vaginal discharge  · You have pain or burning when you urinate, less urine than usual, or pink or bloody urine  · You have questions or concerns about your condition or care  How to care for yourself at this stage of your pregnancy:   · Eat a variety of healthy foods  Healthy foods include fruits, vegetables, whole-grain breads, low-fat dairy foods, beans, lean meats, and fish  Drink liquids as directed  Ask how much liquid to drink each day and which liquids are best for you  Limit caffeine to less than 200 milligrams each day  Limit your intake of fish to 2 servings each week  Choose fish low in mercury such as canned light tuna, shrimp, salmon, cod, or tilapia  Do not  eat fish high in mercury such as swordfish, tilefish, jackie mackerel, and shark  · Take prenatal vitamins as directed    Your need for certain vitamins and minerals, such as folic acid, increases during pregnancy  Prenatal vitamins provide some of the extra vitamins and minerals you need  Prenatal vitamins may also help to decrease the risk of certain birth defects  · Rest as needed  Put your feet up if you have swelling in your ankles and feet  · Do not smoke  If you smoke, it is never too late to quit  Smoking increases your risk of a miscarriage and other health problems during your pregnancy  Smoking can cause your baby to be born too early or weigh less at birth  Ask your healthcare provider for information if you need help quitting  · Do not drink alcohol  Alcohol passes from your body to your baby through the placenta  It can affect your baby's brain development and cause fetal alcohol syndrome (FAS)  FAS is a group of conditions that causes mental, behavior, and growth problems  · Talk to your healthcare provider before you take any medicines  Many medicines may harm your baby if you take them when you are pregnant  Do not take any medicines, vitamins, herbs, or supplements without first talking to your healthcare provider  Never use illegal or street drugs (such as marijuana or cocaine) while you are pregnant  · Talk to your healthcare provider before you travel  You may not be able to travel in an airplane after 36 weeks  He may also recommend that you avoid long road trips  Safety tips:   · Avoid hot tubs and saunas  Do not use a hot tub or sauna while you are pregnant, especially during your first trimester  Hot tubs and saunas may raise your baby's temperature and increase the risk of birth defects  · Avoid toxoplasmosis  This is an infection caused by eating raw meat or being around infected cat feces  It can cause birth defects, miscarriages, and other problems  Wash your hands after you touch raw meat  Make sure any meat is well-cooked before you eat it  Avoid raw eggs and unpasteurized milk   Use gloves or ask someone else to clean your cat's litter box while you are pregnant  · Ask your healthcare provider about travel  The most comfortable time to travel is during the second trimester  Ask your healthcare provider if you can travel after 36 weeks  You may not be able to travel in an airplane after 36 weeks  He may also recommend that you avoid long road trips  Changes that are happening with your baby:  By 38 weeks, your baby may weigh between 6 and 9 pounds  Your baby may be about 14 inches long from the top of the head to the rump (baby's bottom)  Your baby hears well enough to know your voice  As your baby gets larger, you may feel fewer kicks and more stretching and rolling  Your baby may move into a head-down position  Your baby will also rest lower in your abdomen  What you need to know about prenatal care: Your healthcare provider will check your blood pressure and weight  You may also need the following:  · A urine test  may also be done to check for sugar and protein  These can be signs of gestational diabetes or infection  Protein in your urine may also be a sign of preeclampsia  Preeclampsia is a condition that can develop during week 20 or later of your pregnancy  It causes high blood pressure, and it can cause problems with your kidneys and other organs  · A blood test  may be done to check for anemia (low iron level)  · A Tdap vaccine  may be recommended by your healthcare provider  · A group B strep test  is a test that is done to check for group B strep infection  Group B strep is a type of bacteria that may be found in the vagina or rectum  It can be passed to your baby during delivery if you have it  Your healthcare provider will take swab your vagina or rectum and send the sample to the lab for tests  · Fundal height  is a measurement of your uterus to check your baby's growth  This number is usually the same as the number of weeks that you have been pregnant   Your healthcare provider may also check your baby's position  · Your baby's heart rate  will be checked  © 2017 [address] [employer] Information is for End User's use only and may not be sold, redistributed or otherwise used for commercial purposes  All illustrations and images included in CareNotes® are the copyrighted property of [employer]  or [employer]  The above information is an  only  It is not intended as medical advice for individual conditions or treatments  Talk to your doctor, nurse or pharmacist before following any medical regimen to see if it is safe and effective for you

## 2020-01-06 NOTE — PROGRESS NOTES
Patient has specifically requested delivery at [facility] as she has requested a desire to stay and be delivered by her doctors  She has met the residents that will be involved in her care at time of delivery and given her anxiety and comorbidities in this pregnancy, expressed delivery at a hospital where these physicians will be present  She does not wish to deliver at [facility]      MD CHAPARRITA Jacinto PGY-2  1/6/2020 4:41 PM

## 2020-01-07 ENCOUNTER — TELEPHONE (OUTPATIENT)
Dept: NEUROLOGY | Facility: CLINIC | Age: 20
End: 2020-01-07

## 2020-01-07 NOTE — TELEPHONE ENCOUNTER
pt called and states that she was seen in the hospital by neurology and she states that she was put on bedrest due to her seizures until after she gives birth  she is asking for a note stating this  so that she can give to her  so that she can receive benefits  she states that she will be moving to [place] and is planning on establishing care there  she will not be following up with  neurology  please reach out to pt and advise  she states that she would need need ASAP       I do not see anything noted about bedrest in neurology notes from hospitalization  [phone]  Also sent a tiger text to joelle gillespie, PAC

## 2020-01-08 NOTE — TELEPHONE ENCOUNTER
i received a tiger text back from joelle  he states that the they did not formally place her on bedrest, just counseled her to avoid dangerous situations/activites if seizure were to recur and to refrain from driving  he states that he is not sure where she received that information, it was not from our team (nor was it documented in my note)  As far as disability forms/notes go, i would defer to her PCP (I know she is also pregnant and about to give birth in a few weeks)  Pt made aware of above

## 2020-01-09 ENCOUNTER — TELEPHONE (OUTPATIENT)
Dept: LABOR AND DELIVERY | Facility: HOSPITAL | Age: 20
End: 2020-01-09

## 2020-01-09 NOTE — TELEPHONE ENCOUNTER
Spoke to patient today re: scheduled  section on 2020 @ 10AM  She reports feeling well without complaints  She reports irregular contractions at night, "Audrea Jumper" per patient; she denies leakage of fluid or vaginal bleeding  She reports good fetal movement  We reviewed that her section will take place at [facility]  She is agreeable  She wishes to meet the Resident doing her surgery prior to that day  I spoke to CIERA Light re: scheduling her for an appointment with  Dr Victor M Solomon on , opening at [phone] - she will call the patient to make her aware  Strict labor & kick count precautions reviewed

## 2020-01-09 NOTE — TELEPHONE ENCOUNTER
Spoke with pt regarding Dr Sherrie Gaffney recommendations     Pt agreed to [facility] appointment at 46:58 am with Dr Jermaine Baxter

## 2020-01-13 ENCOUNTER — ROUTINE PRENATAL (OUTPATIENT)
Dept: OBGYN CLINIC | Facility: CLINIC | Age: 20
End: 2020-01-13

## 2020-01-13 VITALS
SYSTOLIC BLOOD PRESSURE: 108 MMHG | DIASTOLIC BLOOD PRESSURE: 58 MMHG | BODY MASS INDEX: 29.47 KG/M2 | WEIGHT: 131 LBS | HEIGHT: 56 IN | HEART RATE: 114 BPM

## 2020-01-13 DIAGNOSIS — R56.9 SEIZURE (HCC): ICD-10-CM

## 2020-01-13 DIAGNOSIS — Z87.898 HISTORY OF DOMESTIC VIOLENCE: ICD-10-CM

## 2020-01-13 DIAGNOSIS — Z3A.37 37 WEEKS GESTATION OF PREGNANCY: Primary | ICD-10-CM

## 2020-01-13 PROCEDURE — 99213 OFFICE O/P EST LOW 20 MIN: CPT | Performed by: OBSTETRICS & GYNECOLOGY

## 2020-01-13 NOTE — PROGRESS NOTES
OB/GYN prenatal visit    S: 20 y o  Reanna Powell 37w3d here for PN visit  She has no obstetric complaints, including pelvic pain, contractions, vaginal bleeding, loss of fluid, or decreased fetal movement  She is complaining of seizures that occurred since beginning of  and she did not admit to hospital and did not get any medical attention  Patient is on Keppra 500mg BID  Patient has extensive work up at [facility] for seizure activity imaging was WNL and no seizure activity appreciated at EEG and no intracranial pathology seen with MRI brain imaging    O:  Vitals:    20 1100   BP: 108/58   Pulse: (!) 114       Gen: no acute distress, nonlabored breathing  Fundal Height (cm): 36 cm  Fetal Heart Rate: 136    A/P:    Problem List Items Addressed This Visit     None        - 37w4d IUP planned primary  section on 20  - Seizure activity during pregnancy; She mentioned she has numerous seizures last weeks  She had work up at [facility] and she will continue see neurology and she may undergo video EEG after delivery  I assure patient if she has any seizure she need to admit ED  I recommend use Keppra 500 mg BID regularly        36 weeks: collect GBS/PCN allergy, check fetal position, contraception and birth plan, Jeffrey Howell MD  [id number]  02:49 PM

## 2020-01-14 PROBLEM — Z3A.37 37 WEEKS GESTATION OF PREGNANCY: Status: ACTIVE | Noted: 2019-06-25

## 2020-01-19 ENCOUNTER — APPOINTMENT (EMERGENCY)
Dept: RADIOLOGY | Facility: HOSPITAL | Age: 20
End: 2020-01-19
Payer: COMMERCIAL

## 2020-01-19 ENCOUNTER — HOSPITAL ENCOUNTER (OUTPATIENT)
Facility: HOSPITAL | Age: 20
Setting detail: OBSERVATION
Discharge: HOME | End: 2020-01-20
Attending: EMERGENCY MEDICINE | Admitting: OBSTETRICS & GYNECOLOGY
Payer: COMMERCIAL

## 2020-01-19 DIAGNOSIS — Z3A.38 38 WEEKS GESTATION OF PREGNANCY: Primary | ICD-10-CM

## 2020-01-19 DIAGNOSIS — R56.9 SEIZURE (HCC): ICD-10-CM

## 2020-01-19 LAB
ALBUMIN SERPL BCP-MCNC: 2.6 G/DL (ref 3.5–5)
ALP SERPL-CCNC: 192 U/L (ref 46–116)
ALT SERPL W P-5'-P-CCNC: 16 U/L (ref 12–78)
AMPHETAMINES SERPL QL SCN: NEGATIVE
ANION GAP SERPL CALCULATED.3IONS-SCNC: 5 MMOL/L (ref 4–13)
AST SERPL W P-5'-P-CCNC: 18 U/L (ref 5–45)
BARBITURATES UR QL: NEGATIVE
BASOPHILS # BLD AUTO: 0.02 THOUSANDS/ΜL (ref 0–0.1)
BASOPHILS NFR BLD AUTO: 0 % (ref 0–1)
BENZODIAZ UR QL: NEGATIVE
BILIRUB SERPL-MCNC: 0.18 MG/DL (ref 0.2–1)
BUN SERPL-MCNC: 2 MG/DL (ref 5–25)
CALCIUM SERPL-MCNC: 8.8 MG/DL (ref 8.3–10.1)
CHLORIDE SERPL-SCNC: 109 MMOL/L (ref 100–108)
CO2 SERPL-SCNC: 26 MMOL/L (ref 21–32)
COCAINE UR QL: NEGATIVE
CREAT SERPL-MCNC: 0.44 MG/DL (ref 0.6–1.3)
CREAT UR-MCNC: 21.9 MG/DL
EOSINOPHIL # BLD AUTO: 0.07 THOUSAND/ΜL (ref 0–0.61)
EOSINOPHIL NFR BLD AUTO: 1 % (ref 0–6)
ERYTHROCYTE [DISTWIDTH] IN BLOOD BY AUTOMATED COUNT: 17.6 % (ref 11.6–15.1)
GFR SERPL CREATININE-BSD FRML MDRD: 146 ML/MIN/1.73SQ M
GLUCOSE SERPL-MCNC: 85 MG/DL (ref 65–140)
HCT VFR BLD AUTO: 32.8 % (ref 34.8–46.1)
HGB BLD-MCNC: 10.6 G/DL (ref 11.5–15.4)
IMM GRANULOCYTES # BLD AUTO: 0.06 THOUSAND/UL (ref 0–0.2)
IMM GRANULOCYTES NFR BLD AUTO: 1 % (ref 0–2)
LYMPHOCYTES # BLD AUTO: 1.76 THOUSANDS/ΜL (ref 0.6–4.47)
LYMPHOCYTES NFR BLD AUTO: 20 % (ref 14–44)
MCH RBC QN AUTO: 27.5 PG (ref 26.8–34.3)
MCHC RBC AUTO-ENTMCNC: 32.3 G/DL (ref 31.4–37.4)
MCV RBC AUTO: 85 FL (ref 82–98)
METHADONE UR QL: NEGATIVE
MONOCYTES # BLD AUTO: 0.83 THOUSAND/ΜL (ref 0.17–1.22)
MONOCYTES NFR BLD AUTO: 9 % (ref 4–12)
NEUTROPHILS # BLD AUTO: 6.18 THOUSANDS/ΜL (ref 1.85–7.62)
NEUTS SEG NFR BLD AUTO: 69 % (ref 43–75)
NRBC BLD AUTO-RTO: 0 /100 WBCS
OPIATES UR QL SCN: NEGATIVE
PCP UR QL: NEGATIVE
PLATELET # BLD AUTO: 240 THOUSANDS/UL (ref 149–390)
PMV BLD AUTO: 10.1 FL (ref 8.9–12.7)
POTASSIUM SERPL-SCNC: 4.2 MMOL/L (ref 3.5–5.3)
PROT SERPL-MCNC: 7 G/DL (ref 6.4–8.2)
PROT UR-MCNC: 10 MG/DL
PROT/CREAT UR: 0.46 MG/G{CREAT} (ref 0–0.1)
RBC # BLD AUTO: 3.86 MILLION/UL (ref 3.81–5.12)
SODIUM SERPL-SCNC: 140 MMOL/L (ref 136–145)
THC UR QL: NEGATIVE
WBC # BLD AUTO: 8.92 THOUSAND/UL (ref 4.31–10.16)

## 2020-01-19 PROCEDURE — 80053 COMPREHEN METABOLIC PANEL: CPT | Performed by: EMERGENCY MEDICINE

## 2020-01-19 PROCEDURE — 84156 ASSAY OF PROTEIN URINE: CPT | Performed by: OBSTETRICS & GYNECOLOGY

## 2020-01-19 PROCEDURE — 99285 EMERGENCY DEPT VISIT HI MDM: CPT | Performed by: EMERGENCY MEDICINE

## 2020-01-19 PROCEDURE — 36415 COLL VENOUS BLD VENIPUNCTURE: CPT | Performed by: EMERGENCY MEDICINE

## 2020-01-19 PROCEDURE — 93005 ELECTROCARDIOGRAM TRACING: CPT

## 2020-01-19 PROCEDURE — 80307 DRUG TEST PRSMV CHEM ANLYZR: CPT | Performed by: OBSTETRICS & GYNECOLOGY

## 2020-01-19 PROCEDURE — 99215 OFFICE O/P EST HI 40 MIN: CPT

## 2020-01-19 PROCEDURE — 70355 PANORAMIC X-RAY OF JAWS: CPT

## 2020-01-19 PROCEDURE — 99285 EMERGENCY DEPT VISIT HI MDM: CPT

## 2020-01-19 PROCEDURE — 80177 DRUG SCRN QUAN LEVETIRACETAM: CPT | Performed by: OBSTETRICS & GYNECOLOGY

## 2020-01-19 PROCEDURE — 85025 COMPLETE CBC W/AUTO DIFF WBC: CPT | Performed by: EMERGENCY MEDICINE

## 2020-01-19 PROCEDURE — 82570 ASSAY OF URINE CREATININE: CPT | Performed by: OBSTETRICS & GYNECOLOGY

## 2020-01-19 PROCEDURE — 96365 THER/PROPH/DIAG IV INF INIT: CPT

## 2020-01-19 RX ORDER — ACETAMINOPHEN 325 MG/1
650 TABLET ORAL EVERY 6 HOURS PRN
Status: DISCONTINUED | OUTPATIENT
Start: 2020-01-19 | End: 2020-01-20 | Stop reason: HOSPADM

## 2020-01-19 RX ORDER — MAGNESIUM SULFATE HEPTAHYDRATE 40 MG/ML
2 INJECTION, SOLUTION INTRAVENOUS ONCE
Status: COMPLETED | OUTPATIENT
Start: 2020-01-19 | End: 2020-01-19

## 2020-01-19 RX ADMIN — ACETAMINOPHEN 650 MG: 325 TABLET ORAL at 20:42

## 2020-01-19 RX ADMIN — LEVETIRACETAM 1000 MG: 100 INJECTION, SOLUTION INTRAVENOUS at 17:54

## 2020-01-19 RX ADMIN — MAGNESIUM SULFATE HEPTAHYDRATE 2 G: 40 INJECTION, SOLUTION INTRAVENOUS at 17:40

## 2020-01-19 RX ADMIN — SODIUM CHLORIDE 1000 ML: 0.9 INJECTION, SOLUTION INTRAVENOUS at 17:39

## 2020-01-19 NOTE — LETTER
[facility] LABOR AND DELIVERY  [address] [place] [address]  Dept: [phone]    2020     Patient: Samuel June   YOB: 2000   Date of Visit: 2020       To Whom it May Concern:    Samuel June is under my professional care and scheduled for  section on 2020  Her mother, Nicole Sheppard, will be her support person  If you have any questions or concerns, please don't hesitate to call           Sincerely,          Hope Kim MD

## 2020-01-20 VITALS
OXYGEN SATURATION: 96 % | HEIGHT: 56 IN | DIASTOLIC BLOOD PRESSURE: 53 MMHG | SYSTOLIC BLOOD PRESSURE: 100 MMHG | TEMPERATURE: 98 F | WEIGHT: 131 LBS | BODY MASS INDEX: 29.47 KG/M2 | HEART RATE: 82 BPM | RESPIRATION RATE: 18 BRPM

## 2020-01-20 LAB
ATRIAL RATE: 100 BPM
P AXIS: 10 DEGREES
PR INTERVAL: 134 MS
QRS AXIS: 78 DEGREES
QRSD INTERVAL: 70 MS
QT INTERVAL: 314 MS
QTC INTERVAL: 405 MS
T WAVE AXIS: 30 DEGREES
VENTRICULAR RATE: 100 BPM

## 2020-01-20 PROCEDURE — 93010 ELECTROCARDIOGRAM REPORT: CPT | Performed by: INTERNAL MEDICINE

## 2020-01-20 RX ORDER — ACETAMINOPHEN 325 MG/1
650 TABLET ORAL EVERY 6 HOURS PRN
Qty: 30 TABLET | Refills: 0 | Status: SHIPPED | OUTPATIENT
Start: 2020-01-20 | End: 2020-12-01

## 2020-01-20 RX ADMIN — ACETAMINOPHEN 650 MG: 325 TABLET ORAL at 09:32

## 2020-01-20 NOTE — PROGRESS NOTES
Pt IV's removed and breakfast ordered  Pt comfortable with no complaints this morning   Per Dr Kevin Knight, pt to be discharged after eating breakfast

## 2020-01-20 NOTE — PROGRESS NOTES
Patient slept well overnight with no seizure activity noted by herself or the nursing staff  Ate dinner last night and feeling hungry this morning  Still with stuffy nose and postnasal drip  Feeling good movement  FHTs category 1/reactive  OK to discharge to home this morning  Scheduled for primary c/s on Friday 1/24/2020  Call if any concerns prior to this

## 2020-01-20 NOTE — UTILIZATION REVIEW
Initial Clinical Review    Admission: Date/Time/Statement:   20   Orders Placed This Encounter   Procedures    Place in Observation     Standing Status:   Standing     Number of Occurrences:   1     Order Specific Question:   Admitting Physician     Answer:   Irwin Holstein [[id number]]     Order Specific Question:   Level of Care     Answer:   Med Surg [16]     ED Arrival Information     Expected Arrival Acuity Means of Arrival Escorted By Service Admission Type    2020 17:24 Emergent Walk-In Family Member OB/GYN Emergency    Arrival Complaint    Sruthi Snide Weeks pregnant        Chief Complaint   Patient presents with    Seizure - Prior Hx Of     38 weeks pregnant had an unwittness seizure, family drooped her off here pt is slow to respond and fussy on details does state she has been taking her seizure meds  pt states that her baby (girl) is not moving around  and she has a pain in her abd     Pregnancy Problem     Assessment/Plan: 20 y o  yo  at 38w2d weeks presents to Labor and Delivery from ER with c/o "blacking out/seizure"      Patient reports she was lying in her bed this evening, feeling tired and with stuffy nose dripping down to her throat when she felt like she "blacked out"  When she was alert, she called to her father who was in his room but he did not come  She got up, walked to his room and asked him to take her to the hospital   She denies nausea/vomiting/loss of bowel or bladder  She reports mild pain in her abdomen which is coming and going  She reports feeling the baby move although it is a little less than usual Patient reports she has been taking her Keppra 500mg - 2 tabs po BID as she was instructed  Has not scheduled f/u with neurology --was originally planning on moving to [place] - has unstable housing  FOB incarcerated    Patient is scheduled for an elective primary c/s on 2020 -  States she does not want to go into labor, afraid she might "catch another seizure"        Plan:   1 seizure disorder - check Keppra level and observe patient in triage overnight  2  Term gestation with mild contractions - not changing her cervix  3  Check UDS  4   Loaded with 2g magnesium in ER ---normal BP, normal labs,  Check protein/creatinine ratio - doubt this is eclampsia  5    Tylenol for headache         OBTriage Vitals   Temperature Pulse Respirations Blood Pressure SpO2   01/19/20 1834 01/19/20 1741 01/19/20 1741 01/19/20 1741 01/19/20 1741   97 5 °F (36 4 °C) 105 18 107/66 98 %      Temp Source Heart Rate Source Patient Position - Orthostatic VS BP Location FiO2 (%)   01/19/20 1834 01/19/20 1840 01/19/20 1840 01/19/20 1840 --   Oral Monitor Lying Right arm       Pain Score       01/19/20 1834       8        Wt Readings from Last 1 Encounters:   01/19/20 59 4 kg (131 lb)     Additional Vital Signs:   01/19/20 1950    88           01/19/20 1946    92      100 %     01/19/20 1941    98      100 %     01/19/20 1936    92      100 %     01/19/20 1934    93    98/48Abnormal        01/19/20 1931    92      99 %     01/19/20 1927    101      85 %Abnormal      01/19/20 1926    102      100 %     01/19/20 1921    98      100 %     01/19/20 1918    88    103/62       01/19/20 1916    92      100 %     01/19/20 1915    91      75 %Abnormal      01/19/20 1911    100      100 %     01/19/20 1906    100      100 %     01/19/20 1903    99    102/63       01/19/20 1900    93    102/62  100 %     01/19/20 1856    97      100 %     01/19/20 1840    96    102/67  100 %  Lying   01/19/20 1834  97 5 °F (36 4 °C)    18             Pertinent Labs/Diagnostic Test Results:   Results from last 7 days   Lab Units 01/19/20  1744   WBC Thousand/uL 8 92   HEMOGLOBIN g/dL 10 6*   HEMATOCRIT % 32 8*   PLATELETS Thousands/uL 240   NEUTROS ABS Thousands/µL 6 18         Results from last 7 days   Lab Units 01/19/20  1744   SODIUM mmol/L 140   POTASSIUM mmol/L 4 2   CHLORIDE mmol/L 109*   CO2 mmol/L 26   ANION GAP mmol/L 5   BUN mg/dL 2*   CREATININE mg/dL 0 44*   EGFR ml/min/1 73sq m 146   CALCIUM mg/dL 8 8     Results from last 7 days   Lab Units 01/19/20  1744   AST U/L 18   ALT U/L 16   ALK PHOS U/L 192*   TOTAL PROTEIN g/dL 7 0   ALBUMIN g/dL 2 6*   TOTAL BILIRUBIN mg/dL 0 18*         Results from last 7 days   Lab Units 01/19/20  1744   GLUCOSE RANDOM mg/dL 85     Results from last 7 days   Lab Units 01/19/20  1959   CREATININE UR mg/dL 21 9   PROTEIN UR mg/dL 10   PROT/CREAT RATIO UR  0 46*         Results from last 7 days   Lab Units 01/19/20 1959   AMPH/METH  Negative   BARBITURATE UR  Negative   BENZODIAZEPINE UR  Negative   COCAINE UR  Negative   METHADONE URINE  Negative   OPIATE UR  Negative   PCP UR  Negative   THC UR  Negative       ED Treatment:   Medication Administration from 01/19/2020 1724 to 01/19/2020 1832       Date/Time Order Dose Route Action     01/19/2020 1739 sodium chloride 0 9 % bolus 1,000 mL 1,000 mL Intravenous New Bag     01/19/2020 1754 levETIRAcetam (KEPPRA) 1,000 mg in sodium chloride 0 9 % 100 mL IVPB 1,000 mg Intravenous New Bag     01/19/2020 1740 magnesium sulfate 2 g/50 mL IVPB (premix) 2 g 2 g Intravenous New Bag        Past Medical History:   Diagnosis Date    Seizure ([facility] [address] )     Varicella      Present on Admission:   Seizure [facility])      Admitting Diagnosis: 38 weeks gestation of pregnancy [Z3A 38]  Age/Sex: 20 y o  female  Admission Orders:  Scheduled Medications:     Continuous IV Infusions:     PRN Meds:    acetaminophen 650 mg Oral Q6H PRN       NST q shift  continuous pulse oximetry and cardio-pulmonary monitoring    Network Utilization Review Department  [email or web address]  ATTENTION: Please call with any questions or concerns to [phone] and carefully listen to the prompts so that you are directed to the right person   All voicemails are confidential   Maria Luisa calderon requests for admission clinical reviews, approved or denied determinations and any other requests to dedicated fax number below belonging to the campus where the patient is receiving treatment   List of dedicated fax numbers for the Facilities:  [address] [place] [address] DENIALS (Administrative/Medical Necessity) [phone]   [address]  (Maternity/NICU/Pediatrics) [phone]   Yue Quinn [phone]   Marie Lechuga [phone]   Marci Pereira [phone]   Damien Leonardo [phone]   [address] [place] [address] [phone]   [facility]  [phone]   [address] [facility]  [address] [facility] [address] [facility] [phone]
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
## 2020-01-21 NOTE — ED PROVIDER NOTES
History  Chief Complaint   Patient presents with    Seizure - Prior Hx Of     38 weeks pregnant had an unwittness seizure, family drooped her off here pt is slow to respond and fussy on details does state she has been taking her seizure meds  pt states that her baby (girl) is not moving around  and she has a pain in her abd     Pregnancy Problem     19 yo  at 41 wks with h/o seizures in pregnancy with on prior pregnancy at age 14 presenting for unwitnessed seizure this evening and concern that her baby is no longer moving afterwards  Has some lower abdominal cramping as well, denies vaginal bleeding or loss of fluids  Has been taking 1000 mg keppra BID as recommended by neurology and ob/gyn, no missed doses  States this activity occurred while she was laying in her bed  Unsure how long it lasted  Denies head trauma  Has some right sided jaw pain  Thinks she may have bit the right side of her tongue  Denies headache  ROS otherwise neg as below  History provided by:  Patient   used: No    Seizure - Prior Hx Of   Pregnancy Problem   Associated symptoms: abdominal pain and fatigue    Associated symptoms: no back pain, no dizziness, no dysuria, no fever, no nausea and no vaginal discharge        Prior to Admission Medications   Prescriptions Last Dose Informant Patient Reported? Taking? Prenatal Vit-Fe Fumarate-FA (PRENATAL 1+1 PO)  Self Yes No   Sig: Take by mouth   ferrous sulfate 324 (65 Fe) mg   No No   Sig: Take 1 tablet (324 mg total) by mouth every other day   levETIRAcetam (KEPPRA) 500 mg tablet   No No   Sig: Take 2 tablets (1,000 mg total) by mouth every morning AND 2 tablets (1,000 mg total) daily at bedtime     levETIRAcetam (KEPPRA) 500 mg tablet   No No   Sig: Take 1 tablet (500 mg total) by mouth every 12 (twelve) hours   Patient not taking: Reported on 2020      Facility-Administered Medications: None       Past Medical History:   Diagnosis Date    [facility]izure ([facility] [address] )     Varicella        History reviewed  No pertinent surgical history  Family History   Problem Relation Age of Onset    Fibroids Mother     No Known Problems Father     Asthma Brother     Diabetes Maternal Grandmother     Breast cancer Maternal Grandmother     Brain cancer Maternal Grandmother     Diabetes Maternal Grandfather     Dementia Maternal Grandfather     Diabetes Paternal Grandmother     No Known Problems Paternal Grandfather      I have reviewed and agree with the history as documented  Social History     Tobacco Use    Smoking status: Never Smoker    Smokeless tobacco: Never Used   Substance Use Topics    Alcohol use: Not Currently     Frequency: Never     Drinks per session: Patient refused     Binge frequency: Never    Drug use: Not Currently     Types: Marijuana        Review of Systems   Constitutional: Positive for fatigue  Negative for chills and fever  HENT: Negative for congestion, rhinorrhea and sore throat  Eyes: Negative for redness and visual disturbance  Respiratory: Negative for shortness of breath and wheezing  Cardiovascular: Negative for chest pain and palpitations  Gastrointestinal: Positive for abdominal pain  Negative for diarrhea, nausea and vomiting  Genitourinary: Positive for pelvic pain  Negative for difficulty urinating, dysuria, hematuria, vaginal bleeding and vaginal discharge  Musculoskeletal: Negative for back pain, gait problem, neck pain and neck stiffness  Skin: Negative for pallor and rash  Neurological: Positive for seizures  Negative for dizziness, syncope, weakness and headaches  Psychiatric/Behavioral: Positive for confusion  The patient is not nervous/anxious          Physical Exam  ED Triage Vitals   Temperature Pulse Respirations Blood Pressure SpO2   01/19/20 1834 01/19/20 1741 01/19/20 1741 01/19/20 1741 01/19/20 1741   97 5 °F (36 4 °C) 105 18 107/66 98 %      Temp Source Heart Rate Source Patient Position - Orthostatic VS BP Location FiO2 (%)   01/19/20 1834 01/19/20 1840 01/19/20 1840 01/19/20 1840 --   Oral Monitor Lying Right arm       Pain Score       01/19/20 1834       8             Orthostatic Vital Signs  Vitals:    01/19/20 2134 01/19/20 2148 01/19/20 2151 01/20/20 0559   BP: (!) 94/49 (!) 89/48 91/52 100/53   Pulse: 89 94 94 82   Patient Position - Orthostatic VS:    Lying       Physical Exam   Constitutional: She is oriented to person, place, and time  She appears well-developed and well-nourished  No distress  Patient fatigued but interacting with h&p   HENT:   Head: Normocephalic and atraumatic  Some tenderness of right TMJ, able to open mouth and close appropriately  No obvious signs of trauma inside the mouth  Eyes: Pupils are equal, round, and reactive to light  Conjunctivae are normal    Neck: Normal range of motion  Cardiovascular: Regular rhythm and normal heart sounds  No murmur heard  Mildly tachy   Pulmonary/Chest: Effort normal and breath sounds normal  No respiratory distress  She has no wheezes  She has no rales  Abdominal: Soft  Bowel sounds are normal  There is no tenderness  Gravid abdomen  No tenderness  Genitourinary:   Genitourinary Comments: No vaginal bleeding present   Musculoskeletal: Normal range of motion  Neurological: She is alert and oriented to person, place, and time  No cranial nerve deficit or sensory deficit  She exhibits normal muscle tone  Coordination normal    5/5 strength in all extremities, symmetric   Skin: Skin is warm and dry  No rash noted  Psychiatric: She has a normal mood and affect  Nursing note and vitals reviewed        ED Medications  Medications   sodium chloride 0 9 % bolus 1,000 mL (0 mL Intravenous Stopped 1/19/20 1844)   levETIRAcetam (KEPPRA) 1,000 mg in sodium chloride 0 9 % 100 mL IVPB (0 mg Intravenous Stopped 1/19/20 1813)   magnesium sulfate 2 g/50 mL IVPB (premix) 2 g (0 g Intravenous Stopped 1/19/20 1813)       Diagnostic Studies  Results Reviewed     Procedure Component Value Units Date/Time    Comprehensive metabolic panel [[id number]]  (Abnormal) Collected:  01/19/20 1744    Lab Status:  Final result Specimen:  Blood from Arm, Left Updated:  01/19/20 1812     Sodium 140 mmol/L      Potassium 4 2 mmol/L      Chloride 109 mmol/L      CO2 26 mmol/L      ANION GAP 5 mmol/L      BUN 2 mg/dL      Creatinine 0 44 mg/dL      Glucose 85 mg/dL      Calcium 8 8 mg/dL      AST 18 U/L      ALT 16 U/L      Alkaline Phosphatase 192 U/L      Total Protein 7 0 g/dL      Albumin 2 6 g/dL      Total Bilirubin 0 18 mg/dL      eGFR 146 ml/min/1 73sq m     Narrative:       National Kidney Disease Foundation guidelines for Chronic Kidney Disease (CKD):     Stage 1 with normal or high GFR (GFR > 90 mL/min/1 73 square meters)    Stage 2 Mild CKD (GFR = 60-89 mL/min/1 73 square meters)    Stage 3A Moderate CKD (GFR = 45-59 mL/min/1 73 square meters)    Stage 3B Moderate CKD (GFR = 30-44 mL/min/1 73 square meters)    Stage 4 Severe CKD (GFR = 15-29 mL/min/1 73 square meters)    Stage 5 End Stage CKD (GFR <15 mL/min/1 73 square meters)  Note: GFR calculation is accurate only with a steady state creatinine    CBC and differential [[id number]]  (Abnormal) Collected:  01/19/20 1744    Lab Status:  Final result Specimen:  Blood from Arm, Left Updated:  01/19/20 1752     WBC 8 92 Thousand/uL      RBC 3 86 Million/uL      Hemoglobin 10 6 g/dL      Hematocrit 32 8 %      MCV 85 fL      MCH 27 5 pg      MCHC 32 3 g/dL      RDW 17 6 %      MPV 10 1 fL      Platelets 056 Thousands/uL      nRBC 0 /100 WBCs      Neutrophils Relative 69 %      Immat GRANS % 1 %      Lymphocytes Relative 20 %      Monocytes Relative 9 %      Eosinophils Relative 1 %      Basophils Relative 0 %      Neutrophils Absolute 6 18 Thousands/µL      Immature Grans Absolute 0 06 Thousand/uL      Lymphocytes Absolute 1 76 Thousands/µL      Monocytes Absolute 0 83 Thousand/µL      Eosinophils Absolute 0 07 Thousand/µL      Basophils Absolute 0 02 Thousands/µL                  XR mandible panorex ([place] only)   Final Result by Orlena Hamman, MD (01/20 1589)      No fracture  Workstation performed: [id number]               Procedures  Procedures      ED Course                               MDM  Number of Diagnoses or Management Options  38 weeks gestation of pregnancy:   Seizure [facility]):   Diagnosis management comments: 19 yo F with seizure disorder on keppra presenting with unwitnessed seizure activity at home with unknown duration, here no focal neuro deficits but does appear possibly post-ictal  Will administer loading dose of 1000 mg keppra iv and mag  Bedside US shows  with diminished fetal movement  Pt with increasing lower abdominal cramping while in the department  Discussed with ob, requesting that patient be sent up to L&D as soon as possible for fetal evaluation and monitoring  No further seizure activity noted before pt sent upstairs  Panorex completed before transfer           Disposition  Final diagnoses:   38 weeks gestation of pregnancy   Seizure [facility])     Time reflects when diagnosis was documented in both MDM as applicable and the Disposition within this note     Time User Action Codes Description Comment    1/20/2020  8:22 AM Burnard Calender Add [Z3A 38] 38 weeks gestation of pregnancy     1/20/2020  8:22 AM Burnard Calender Modify [Z3A 38] 38 weeks gestation of pregnancy     1/20/2020  6:33 PM Mardelle Jordan Add [R56 9] Seizure ([address] )     1/20/2020  6:33 PM Mardelle Jordan Modify [Z3A 38] 38 weeks gestation of pregnancy       ED Disposition     ED Disposition Condition Date/Time Comment    Send to L&D After Provider Arian Ruiz Jan 19, 2020  6:27 PM       Follow-up Information    None         Discharge Medication List as of 1/20/2020  9:38 AM      START taking these medications    Details   acetaminophen (TYLENOL) 325 mg tablet Take 2 tablets (650 mg total) by mouth every 6 (six) hours as needed for headaches, Starting Mon 1/20/2020, Normal         CONTINUE these medications which have NOT CHANGED    Details   ferrous sulfate 324 (65 Fe) mg Take 1 tablet (324 mg total) by mouth every other day, Starting Sat 12/21/2019, Until Mon 1/20/2020, Normal      levETIRAcetam (KEPPRA) 500 mg tablet Multiple Dosages:Starting Sat 12/28/2019, Last dose on Mon 1/27/2020Take 2 tablets (1,000 mg total) by mouth every morning AND 2 tablets (1,000 mg total) daily at bedtime , Normal      Prenatal Vit-Fe Fumarate-FA (PRENATAL 1+1 PO) Take by mouth, Historical Med           No discharge procedures on file  ED Provider  Attending physically available and evaluated [facility]  I managed the patient along with the ED Attending      Electronically Signed by         Elsa Parker MD  01/23/20 1096

## 2020-01-22 LAB — LEVETIRACETAM SERPL-MCNC: 50.1 UG/ML (ref 10–40)

## 2020-01-24 ENCOUNTER — HOSPITAL ENCOUNTER (INPATIENT)
Facility: HOSPITAL | Age: 20
LOS: 4 days | Discharge: HOME | DRG: 540 | End: 2020-01-28
Attending: OBSTETRICS & GYNECOLOGY | Admitting: OBSTETRICS & GYNECOLOGY
Payer: COMMERCIAL

## 2020-01-24 ENCOUNTER — ANESTHESIA EVENT (INPATIENT)
Dept: LABOR AND DELIVERY | Facility: HOSPITAL | Age: 20
DRG: 540 | End: 2020-01-24
Payer: COMMERCIAL

## 2020-01-24 DIAGNOSIS — F41.9 ANXIETY: ICD-10-CM

## 2020-01-24 DIAGNOSIS — Z41.9 ELECTIVE SURGICAL PROCEDURE: Primary | ICD-10-CM

## 2020-01-24 DIAGNOSIS — F41.9 ANXIETY IN PREGNANCY IN THIRD TRIMESTER, ANTEPARTUM: ICD-10-CM

## 2020-01-24 DIAGNOSIS — O99.343 ANXIETY IN PREGNANCY IN THIRD TRIMESTER, ANTEPARTUM: ICD-10-CM

## 2020-01-24 DIAGNOSIS — R56.9 SEIZURE (HCC): ICD-10-CM

## 2020-01-24 PROBLEM — Z3A.39 39 WEEKS GESTATION OF PREGNANCY: Chronic | Status: ACTIVE | Noted: 2020-01-19

## 2020-01-24 LAB
ABO GROUP BLD: NORMAL
AMPHETAMINES SERPL QL SCN: NEGATIVE
BARBITURATES UR QL: NEGATIVE
BASE EXCESS BLDCOA CALC-SCNC: -1.8 MMOL/L (ref 3–11)
BASE EXCESS BLDCOV CALC-SCNC: -1.9 MMOL/L (ref 1–9)
BASOPHILS # BLD AUTO: 0.02 THOUSANDS/ΜL (ref 0–0.1)
BASOPHILS NFR BLD AUTO: 0 % (ref 0–1)
BENZODIAZ UR QL: NEGATIVE
BLD GP AB SCN SERPL QL: NEGATIVE
COCAINE UR QL: NEGATIVE
EOSINOPHIL # BLD AUTO: 0.08 THOUSAND/ΜL (ref 0–0.61)
EOSINOPHIL NFR BLD AUTO: 1 % (ref 0–6)
ERYTHROCYTE [DISTWIDTH] IN BLOOD BY AUTOMATED COUNT: 17.4 % (ref 11.6–15.1)
HCO3 BLDCOA-SCNC: 25 MMOL/L (ref 17.3–27.3)
HCO3 BLDCOV-SCNC: 23.3 MMOL/L (ref 12.2–28.6)
HCT VFR BLD AUTO: 31.8 % (ref 34.8–46.1)
HGB BLD-MCNC: 10.3 G/DL (ref 11.5–15.4)
IMM GRANULOCYTES # BLD AUTO: 0.1 THOUSAND/UL (ref 0–0.2)
IMM GRANULOCYTES NFR BLD AUTO: 1 % (ref 0–2)
LYMPHOCYTES # BLD AUTO: 2.37 THOUSANDS/ΜL (ref 0.6–4.47)
LYMPHOCYTES NFR BLD AUTO: 26 % (ref 14–44)
MCH RBC QN AUTO: 27.2 PG (ref 26.8–34.3)
MCHC RBC AUTO-ENTMCNC: 32.4 G/DL (ref 31.4–37.4)
MCV RBC AUTO: 84 FL (ref 82–98)
METHADONE UR QL: NEGATIVE
MONOCYTES # BLD AUTO: 0.99 THOUSAND/ΜL (ref 0.17–1.22)
MONOCYTES NFR BLD AUTO: 11 % (ref 4–12)
NEUTROPHILS # BLD AUTO: 5.55 THOUSANDS/ΜL (ref 1.85–7.62)
NEUTS SEG NFR BLD AUTO: 61 % (ref 43–75)
NRBC BLD AUTO-RTO: 0 /100 WBCS
O2 CT VFR BLDCOA CALC: 7.2 ML/DL
OPIATES UR QL SCN: NEGATIVE
OXYHGB MFR BLDCOA: 38.2 %
OXYHGB MFR BLDCOV: 72.5 %
PCO2 BLDCOA: 50.4 MM[HG] (ref 30–60)
PCO2 BLDCOV: 41.6 MM HG (ref 27–43)
PCP UR QL: NEGATIVE
PH BLDCOA: 7.31 [PH] (ref 7.23–7.43)
PH BLDCOV: 7.37 [PH] (ref 7.19–7.49)
PLATELET # BLD AUTO: 253 THOUSANDS/UL (ref 149–390)
PMV BLD AUTO: 10.3 FL (ref 8.9–12.7)
PO2 BLDCOA: 19.1 MM HG (ref 5–25)
PO2 BLDCOV: 31.4 MM HG (ref 15–45)
RBC # BLD AUTO: 3.78 MILLION/UL (ref 3.81–5.12)
RH BLD: POSITIVE
RPR SER QL: NORMAL
SAO2 % BLDCOV: 13.2 ML/DL
SPECIMEN EXPIRATION DATE: NORMAL
THC UR QL: NEGATIVE
WBC # BLD AUTO: 9.11 THOUSAND/UL (ref 4.31–10.16)

## 2020-01-24 PROCEDURE — 86850 RBC ANTIBODY SCREEN: CPT | Performed by: OBSTETRICS & GYNECOLOGY

## 2020-01-24 PROCEDURE — 86592 SYPHILIS TEST NON-TREP QUAL: CPT | Performed by: OBSTETRICS & GYNECOLOGY

## 2020-01-24 PROCEDURE — 80307 DRUG TEST PRSMV CHEM ANLYZR: CPT | Performed by: OBSTETRICS & GYNECOLOGY

## 2020-01-24 PROCEDURE — 85025 COMPLETE CBC W/AUTO DIFF WBC: CPT | Performed by: OBSTETRICS & GYNECOLOGY

## 2020-01-24 PROCEDURE — 86900 BLOOD TYPING SEROLOGIC ABO: CPT | Performed by: OBSTETRICS & GYNECOLOGY

## 2020-01-24 PROCEDURE — 86901 BLOOD TYPING SEROLOGIC RH(D): CPT | Performed by: OBSTETRICS & GYNECOLOGY

## 2020-01-24 PROCEDURE — 82805 BLOOD GASES W/O2 SATURATION: CPT | Performed by: OBSTETRICS & GYNECOLOGY

## 2020-01-24 RX ORDER — DOCUSATE SODIUM 100 MG/1
100 CAPSULE, LIQUID FILLED ORAL 2 TIMES DAILY
Status: DISCONTINUED | OUTPATIENT
Start: 2020-01-24 | End: 2020-01-28 | Stop reason: HOSPADM

## 2020-01-24 RX ORDER — DEXAMETHASONE SODIUM PHOSPHATE 4 MG/ML
8 INJECTION, SOLUTION INTRA-ARTICULAR; INTRALESIONAL; INTRAMUSCULAR; INTRAVENOUS; SOFT TISSUE ONCE AS NEEDED
Status: DISPENSED | OUTPATIENT
Start: 2020-01-24 | End: 2020-01-25

## 2020-01-24 RX ORDER — HYDROMORPHONE HCL/PF 1 MG/ML
0.4 SYRINGE (ML) INJECTION
Status: DISCONTINUED | OUTPATIENT
Start: 2020-01-24 | End: 2020-01-28 | Stop reason: HOSPADM

## 2020-01-24 RX ORDER — METOCLOPRAMIDE HYDROCHLORIDE 5 MG/ML
INJECTION INTRAMUSCULAR; INTRAVENOUS AS NEEDED
Status: DISCONTINUED | OUTPATIENT
Start: 2020-01-24 | End: 2020-01-24 | Stop reason: SURG

## 2020-01-24 RX ORDER — LEVETIRACETAM 500 MG/1
1000 TABLET ORAL EVERY 12 HOURS SCHEDULED
Status: DISCONTINUED | OUTPATIENT
Start: 2020-01-24 | End: 2020-01-24 | Stop reason: SDUPTHER

## 2020-01-24 RX ORDER — OXYCODONE HYDROCHLORIDE AND ACETAMINOPHEN 5; 325 MG/1; MG/1
1 TABLET ORAL EVERY 4 HOURS PRN
Status: DISCONTINUED | OUTPATIENT
Start: 2020-01-25 | End: 2020-01-28 | Stop reason: HOSPADM

## 2020-01-24 RX ORDER — LEVETIRACETAM 500 MG/1
1000 TABLET ORAL EVERY 12 HOURS SCHEDULED
Status: DISCONTINUED | OUTPATIENT
Start: 2020-01-24 | End: 2020-01-28 | Stop reason: HOSPADM

## 2020-01-24 RX ORDER — ONDANSETRON 2 MG/ML
4 INJECTION INTRAMUSCULAR; INTRAVENOUS EVERY 8 HOURS PRN
Status: DISCONTINUED | OUTPATIENT
Start: 2020-01-24 | End: 2020-01-24

## 2020-01-24 RX ORDER — DIPHENHYDRAMINE HYDROCHLORIDE 50 MG/ML
25 INJECTION INTRAMUSCULAR; INTRAVENOUS EVERY 6 HOURS PRN
Status: DISPENSED | OUTPATIENT
Start: 2020-01-24 | End: 2020-01-25

## 2020-01-24 RX ORDER — CALCIUM CARBONATE 200(500)MG
1000 TABLET,CHEWABLE ORAL DAILY PRN
Status: DISCONTINUED | OUTPATIENT
Start: 2020-01-24 | End: 2020-01-28 | Stop reason: HOSPADM

## 2020-01-24 RX ORDER — OXYTOCIN/RINGER'S LACTATE 30/500 ML
62.5 PLASTIC BAG, INJECTION (ML) INTRAVENOUS CONTINUOUS
Status: ACTIVE | OUTPATIENT
Start: 2020-01-24 | End: 2020-01-24

## 2020-01-24 RX ORDER — NALOXONE HYDROCHLORIDE 0.4 MG/ML
0.1 INJECTION, SOLUTION INTRAMUSCULAR; INTRAVENOUS; SUBCUTANEOUS
Status: ACTIVE | OUTPATIENT
Start: 2020-01-24 | End: 2020-01-25

## 2020-01-24 RX ORDER — ONDANSETRON 2 MG/ML
4 INJECTION INTRAMUSCULAR; INTRAVENOUS EVERY 8 HOURS PRN
Status: DISCONTINUED | OUTPATIENT
Start: 2020-01-25 | End: 2020-01-28 | Stop reason: HOSPADM

## 2020-01-24 RX ORDER — SODIUM CHLORIDE, SODIUM LACTATE, POTASSIUM CHLORIDE, CALCIUM CHLORIDE 600; 310; 30; 20 MG/100ML; MG/100ML; MG/100ML; MG/100ML
125 INJECTION, SOLUTION INTRAVENOUS CONTINUOUS
Status: DISCONTINUED | OUTPATIENT
Start: 2020-01-24 | End: 2020-01-27

## 2020-01-24 RX ORDER — OXYTOCIN/RINGER'S LACTATE 30/500 ML
PLASTIC BAG, INJECTION (ML) INTRAVENOUS CONTINUOUS PRN
Status: DISCONTINUED | OUTPATIENT
Start: 2020-01-24 | End: 2020-01-24 | Stop reason: SURG

## 2020-01-24 RX ORDER — BUPIVACAINE HYDROCHLORIDE 7.5 MG/ML
INJECTION, SOLUTION INTRASPINAL AS NEEDED
Status: DISCONTINUED | OUTPATIENT
Start: 2020-01-24 | End: 2020-01-24 | Stop reason: SURG

## 2020-01-24 RX ORDER — FENTANYL CITRATE/PF 50 MCG/ML
50 SYRINGE (ML) INJECTION
Status: DISCONTINUED | OUTPATIENT
Start: 2020-01-24 | End: 2020-01-28 | Stop reason: HOSPADM

## 2020-01-24 RX ORDER — SODIUM CHLORIDE, SODIUM LACTATE, POTASSIUM CHLORIDE, CALCIUM CHLORIDE 600; 310; 30; 20 MG/100ML; MG/100ML; MG/100ML; MG/100ML
125 INJECTION, SOLUTION INTRAVENOUS CONTINUOUS
Status: DISCONTINUED | OUTPATIENT
Start: 2020-01-24 | End: 2020-01-28 | Stop reason: HOSPADM

## 2020-01-24 RX ORDER — TRISODIUM CITRATE DIHYDRATE AND CITRIC ACID MONOHYDRATE 500; 334 MG/5ML; MG/5ML
30 SOLUTION ORAL ONCE
Status: COMPLETED | OUTPATIENT
Start: 2020-01-24 | End: 2020-01-24

## 2020-01-24 RX ORDER — METOCLOPRAMIDE HYDROCHLORIDE 5 MG/ML
10 INJECTION INTRAMUSCULAR; INTRAVENOUS ONCE AS NEEDED
Status: DISCONTINUED | OUTPATIENT
Start: 2020-01-24 | End: 2020-01-24

## 2020-01-24 RX ORDER — FENTANYL CITRATE 50 UG/ML
INJECTION, SOLUTION INTRAMUSCULAR; INTRAVENOUS AS NEEDED
Status: DISCONTINUED | OUTPATIENT
Start: 2020-01-24 | End: 2020-01-24 | Stop reason: SURG

## 2020-01-24 RX ORDER — ACETAMINOPHEN 325 MG/1
650 TABLET ORAL EVERY 6 HOURS
Status: DISPENSED | OUTPATIENT
Start: 2020-01-24 | End: 2020-01-25

## 2020-01-24 RX ORDER — METOCLOPRAMIDE HYDROCHLORIDE 5 MG/ML
5 INJECTION INTRAMUSCULAR; INTRAVENOUS EVERY 6 HOURS PRN
Status: DISCONTINUED | OUTPATIENT
Start: 2020-01-24 | End: 2020-01-24

## 2020-01-24 RX ORDER — OXYCODONE HYDROCHLORIDE AND ACETAMINOPHEN 5; 325 MG/1; MG/1
2 TABLET ORAL EVERY 4 HOURS PRN
Status: DISCONTINUED | OUTPATIENT
Start: 2020-01-25 | End: 2020-01-28 | Stop reason: HOSPADM

## 2020-01-24 RX ORDER — FENTANYL CITRATE 50 UG/ML
INJECTION, SOLUTION INTRAMUSCULAR; INTRAVENOUS
Status: COMPLETED
Start: 2020-01-24 | End: 2020-01-24

## 2020-01-24 RX ORDER — ACETAMINOPHEN 325 MG/1
650 TABLET ORAL EVERY 6 HOURS PRN
Status: DISCONTINUED | OUTPATIENT
Start: 2020-01-24 | End: 2020-01-28 | Stop reason: HOSPADM

## 2020-01-24 RX ORDER — MORPHINE SULFATE 0.5 MG/ML
INJECTION, SOLUTION EPIDURAL; INTRATHECAL; INTRAVENOUS AS NEEDED
Status: DISCONTINUED | OUTPATIENT
Start: 2020-01-24 | End: 2020-01-24 | Stop reason: SURG

## 2020-01-24 RX ORDER — ONDANSETRON 2 MG/ML
4 INJECTION INTRAMUSCULAR; INTRAVENOUS EVERY 4 HOURS PRN
Status: DISPENSED | OUTPATIENT
Start: 2020-01-24 | End: 2020-01-25

## 2020-01-24 RX ORDER — HYDROMORPHONE HCL/PF 1 MG/ML
0.5 SYRINGE (ML) INJECTION EVERY 2 HOUR PRN
Status: DISCONTINUED | OUTPATIENT
Start: 2020-01-24 | End: 2020-01-28 | Stop reason: HOSPADM

## 2020-01-24 RX ORDER — CEFAZOLIN SODIUM 2 G/50ML
2000 SOLUTION INTRAVENOUS ONCE
Status: COMPLETED | OUTPATIENT
Start: 2020-01-24 | End: 2020-01-24

## 2020-01-24 RX ORDER — ONDANSETRON 2 MG/ML
INJECTION INTRAMUSCULAR; INTRAVENOUS AS NEEDED
Status: DISCONTINUED | OUTPATIENT
Start: 2020-01-24 | End: 2020-01-24 | Stop reason: SURG

## 2020-01-24 RX ORDER — DIPHENHYDRAMINE HYDROCHLORIDE 50 MG/ML
25 INJECTION INTRAMUSCULAR; INTRAVENOUS EVERY 6 HOURS PRN
Status: DISCONTINUED | OUTPATIENT
Start: 2020-01-24 | End: 2020-01-28 | Stop reason: HOSPADM

## 2020-01-24 RX ORDER — ONDANSETRON 2 MG/ML
4 INJECTION INTRAMUSCULAR; INTRAVENOUS ONCE AS NEEDED
Status: DISCONTINUED | OUTPATIENT
Start: 2020-01-24 | End: 2020-01-25

## 2020-01-24 RX ORDER — SIMETHICONE 80 MG
80 TABLET,CHEWABLE ORAL 4 TIMES DAILY PRN
Status: DISCONTINUED | OUTPATIENT
Start: 2020-01-24 | End: 2020-01-28 | Stop reason: HOSPADM

## 2020-01-24 RX ADMIN — ONDANSETRON 4 MG: 2 INJECTION INTRAMUSCULAR; INTRAVENOUS at 16:15

## 2020-01-24 RX ADMIN — ACETAMINOPHEN 650 MG: 325 TABLET ORAL at 16:16

## 2020-01-24 RX ADMIN — HYDROMORPHONE HYDROCHLORIDE 0.4 MG: 1 INJECTION, SOLUTION INTRAMUSCULAR; INTRAVENOUS; SUBCUTANEOUS at 14:59

## 2020-01-24 RX ADMIN — DIPHENHYDRAMINE HYDROCHLORIDE 25 MG: 50 INJECTION, SOLUTION INTRAMUSCULAR; INTRAVENOUS at 23:21

## 2020-01-24 RX ADMIN — MORPHINE SULFATE 0.15 MG: 0.5 INJECTION, SOLUTION EPIDURAL; INTRATHECAL; INTRAVENOUS at 10:31

## 2020-01-24 RX ADMIN — HYDROMORPHONE HYDROCHLORIDE 0.4 MG: 1 INJECTION, SOLUTION INTRAMUSCULAR; INTRAVENOUS; SUBCUTANEOUS at 14:03

## 2020-01-24 RX ADMIN — ONDANSETRON 4 MG: 2 INJECTION INTRAMUSCULAR; INTRAVENOUS at 10:51

## 2020-01-24 RX ADMIN — Medication 62.5 MILLI-UNITS/MIN: at 12:50

## 2020-01-24 RX ADMIN — SODIUM CHLORIDE, SODIUM LACTATE, POTASSIUM CHLORIDE, AND CALCIUM CHLORIDE: .6; .31; .03; .02 INJECTION, SOLUTION INTRAVENOUS at 10:42

## 2020-01-24 RX ADMIN — SODIUM CHLORIDE, SODIUM LACTATE, POTASSIUM CHLORIDE, AND CALCIUM CHLORIDE 125 ML/HR: .6; .31; .03; .02 INJECTION, SOLUTION INTRAVENOUS at 12:42

## 2020-01-24 RX ADMIN — PHENYLEPHRINE HYDROCHLORIDE 100 MCG: 10 INJECTION INTRAVENOUS at 10:32

## 2020-01-24 RX ADMIN — Medication 250 MILLI-UNITS/MIN: at 10:49

## 2020-01-24 RX ADMIN — FENTANYL CITRATE 15 MCG: 50 INJECTION, SOLUTION INTRAMUSCULAR; INTRAVENOUS at 10:31

## 2020-01-24 RX ADMIN — FENTANYL CITRATE 50 MCG: 50 INJECTION, SOLUTION INTRAMUSCULAR; INTRAVENOUS at 11:53

## 2020-01-24 RX ADMIN — SODIUM CITRATE AND CITRIC ACID MONOHYDRATE 30 ML: 500; 334 SOLUTION ORAL at 10:07

## 2020-01-24 RX ADMIN — DIPHENHYDRAMINE HYDROCHLORIDE 25 MG: 50 INJECTION, SOLUTION INTRAMUSCULAR; INTRAVENOUS at 13:43

## 2020-01-24 RX ADMIN — METOCLOPRAMIDE 10 MG: 5 INJECTION, SOLUTION INTRAMUSCULAR; INTRAVENOUS at 10:51

## 2020-01-24 RX ADMIN — SODIUM CHLORIDE, SODIUM LACTATE, POTASSIUM CHLORIDE, AND CALCIUM CHLORIDE 125 ML/HR: .6; .31; .03; .02 INJECTION, SOLUTION INTRAVENOUS at 20:30

## 2020-01-24 RX ADMIN — PHENYLEPHRINE HYDROCHLORIDE 40 MCG/MIN: 10 INJECTION INTRAVENOUS at 10:32

## 2020-01-24 RX ADMIN — FENTANYL CITRATE 50 MCG: 50 INJECTION, SOLUTION INTRAMUSCULAR; INTRAVENOUS at 13:10

## 2020-01-24 RX ADMIN — CEFAZOLIN SODIUM 2000 MG: 2 SOLUTION INTRAVENOUS at 10:24

## 2020-01-24 RX ADMIN — HYDROMORPHONE HYDROCHLORIDE 0.4 MG: 1 INJECTION, SOLUTION INTRAMUSCULAR; INTRAVENOUS; SUBCUTANEOUS at 15:10

## 2020-01-24 RX ADMIN — LEVETIRACETAM 1000 MG: 500 TABLET, FILM COATED ORAL at 16:16

## 2020-01-24 RX ADMIN — HYDROMORPHONE HYDROCHLORIDE 0.5 MG: 1 INJECTION, SOLUTION INTRAMUSCULAR; INTRAVENOUS; SUBCUTANEOUS at 21:13

## 2020-01-24 RX ADMIN — HYDROMORPHONE HYDROCHLORIDE 0.5 MG: 1 INJECTION, SOLUTION INTRAMUSCULAR; INTRAVENOUS; SUBCUTANEOUS at 16:15

## 2020-01-24 RX ADMIN — BUPIVACAINE HYDROCHLORIDE IN DEXTROSE 1.4 ML: 7.5 INJECTION, SOLUTION SUBARACHNOID at 10:31

## 2020-01-24 RX ADMIN — SODIUM CHLORIDE, SODIUM LACTATE, POTASSIUM CHLORIDE, AND CALCIUM CHLORIDE 125 ML/HR: .6; .31; .03; .02 INJECTION, SOLUTION INTRAVENOUS at 08:15

## 2020-01-24 NOTE — RESULT ENCOUNTER NOTE
Hi Dr Maynard Nephew-  This patient was seen by neurology in the hospital for unwitnessed seizures and has been on Keppra 1000mg po BID  She is being delivered today at 39 weeks  Can you recommend an adjustment for her medication? Followup? Thank you!

## 2020-01-24 NOTE — PLAN OF CARE
Problem: Potential for Falls  Goal: Patient will remain free of falls  Description  INTERVENTIONS:  - Assess patient frequently for physical needs  -  Identify cognitive and physical deficits and behaviors that affect risk of falls    -  [place] fall precautions as indicated by assessment   - Educate patient/family on patient safety including physical limitations  - Instruct patient to call for assistance with activity based on assessment  - Modify environment to reduce risk of injury  - Consider OT/PT consult to assist with strengthening/mobility  Outcome: Progressing     Problem: POSTPARTUM  Goal: Experiences normal postpartum course  Description  INTERVENTIONS:  - Monitor maternal vital signs  - Assess uterine involution and lochia  Outcome: Progressing  Goal: Appropriate maternal -  bonding  Description  INTERVENTIONS:  - Identify family support  - Assess for appropriate maternal/infant bonding   -Encourage maternal/infant bonding opportunities  - Referral to  or  as needed  Outcome: Progressing  Goal: Establishment of infant feeding pattern  Description  INTERVENTIONS:  - Assess breast/bottle feeding  - Refer to lactation as needed  Outcome: Progressing  Goal: Incision(s), wounds(s) or drain site(s) healing without S/S of infection  Description  INTERVENTIONS  - Assess and document risk factors for skin impairment   - Assess and document dressing, incision, wound bed, drain sites and surrounding tissue  - Consider nutrition services referral as needed  - Oral mucous membranes remain intact  - Provide patient/ family education  Outcome: Progressing
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
## 2020-01-24 NOTE — LETTER
[facility] LABOR AND DELIVERY  [address] [place] [address]  Dept: [phone]    January 27, 2020     Patient: Johnie Rosas   YOB: 2000   Date of Visit: 1/24-1/28/2020       To Whom it May Concern:    Johnie Rosas is under my professional care  She was seen in the hospital from 1/24/2020- 1/28/2020  Her support person in the hospital has been her sister, Amanda Griggs, who has missed school during her sister's hospitalization  If you have any questions or concerns, please don't hesitate to call           Sincerely,          Radha Pedroza MD

## 2020-01-24 NOTE — ANESTHESIA PROCEDURE NOTES
Spinal Block    Patient location during procedure: OR  Start time: 1/24/2020 10:31 AM  Reason for block: procedure for pain, at surgeon's request and primary anesthetic  Staffing  Anesthesiologist: Jazmin Marvin MD  Resident/CRNA: Elva Barry CRNA  Performed: resident/CRNA   Preanesthetic Checklist  Completed: patient identified, site marked, surgical consent, pre-op evaluation, timeout performed, IV checked, risks and benefits discussed and monitors and equipment checked  Spinal Block  Patient position: sitting  Prep: Betadine  Patient monitoring: cardiac monitor, frequent blood pressure checks, heart rate and continuous pulse ox  Approach: midline  Location: L4-5  Injection technique: single-shot  Needle  Needle type: pencil-tip   Needle gauge: 25 G  Needle length: 10 cm  Assessment  Sensory level: T4  Injection Assessment:  negative aspiration for heme, no paresthesia on injection and positive aspiration for clear CSF    Post-procedure:  adhesive bandage applied, pressure dressing applied, secured with tape, site cleaned and sterile dressing applied

## 2020-01-24 NOTE — ANESTHESIA POSTPROCEDURE EVALUATION
Post-Op Assessment Note    CV Status:  Stable  Pain Score: 0    Pain management: adequate     Mental Status:  Alert and awake   Hydration Status:  Euvolemic   PONV Controlled:  Controlled   Airway Patency:  Patent   Post Op Vitals Reviewed: Yes      Staff: CRNA           BP      Temp      Pulse    Resp      SpO2

## 2020-01-24 NOTE — ANESTHESIA PREPROCEDURE EVALUATION
Review of Systems/Medical History  Patient summary reviewed  Chart reviewed  No history of anesthetic complications     Cardiovascular  Negative cardio ROS Exercise tolerance (METS): >4,     Pulmonary  Negative pulmonary ROS        GI/Hepatic  Negative GI/hepatic ROS          Negative  ROS        Endo/Other  Negative endo/other ROS      GYN  Currently pregnant ,          Hematology  Anemia ,     Musculoskeletal  Negative musculoskeletal ROS        Neurology  Seizures poorly controlled,    Comment: Absence Seizures, on Keppra  Took Keppra this AM   Psychology   Anxiety,          Patient is a 19 y/o  @ 39 0 weeks with PMH of Seizures and Anemia,  for Primary   Plts 253  Physical Exam    Airway    Mallampati score: I  TM Distance: >3 FB  Neck ROM: full     Dental   No notable dental hx     Cardiovascular  Comment: Negative ROS, Rhythm: regular, Rate: normal, Cardiovascular exam normal    Pulmonary  Pulmonary exam normal Breath sounds clear to auscultation,     Other Findings        Anesthesia Plan  ASA Score- 2     Anesthesia Type- spinal with ASA Monitors  Additional Monitors:   Airway Plan:         Plan Factors- Patient instructed to abstain from smoking on day of procedure  Patient did not smoke on day of surgery  Induction-     Postoperative Plan- Plan for postoperative opioid use  Informed Consent- Anesthetic plan and risks discussed with patient  I personally reviewed this patient with the CRNA  Discussed and agreed on the Anesthesia Plan with the CRNA  Celia Mcclain

## 2020-01-25 LAB
BASOPHILS # BLD AUTO: 0.01 THOUSANDS/ΜL (ref 0–0.1)
BASOPHILS NFR BLD AUTO: 0 % (ref 0–1)
EOSINOPHIL # BLD AUTO: 0.08 THOUSAND/ΜL (ref 0–0.61)
EOSINOPHIL NFR BLD AUTO: 1 % (ref 0–6)
ERYTHROCYTE [DISTWIDTH] IN BLOOD BY AUTOMATED COUNT: 17.4 % (ref 11.6–15.1)
HCT VFR BLD AUTO: 28.6 % (ref 34.8–46.1)
HGB BLD-MCNC: 9.1 G/DL (ref 11.5–15.4)
IMM GRANULOCYTES # BLD AUTO: 0.09 THOUSAND/UL (ref 0–0.2)
IMM GRANULOCYTES NFR BLD AUTO: 1 % (ref 0–2)
LYMPHOCYTES # BLD AUTO: 1.85 THOUSANDS/ΜL (ref 0.6–4.47)
LYMPHOCYTES NFR BLD AUTO: 16 % (ref 14–44)
MCH RBC QN AUTO: 27 PG (ref 26.8–34.3)
MCHC RBC AUTO-ENTMCNC: 31.8 G/DL (ref 31.4–37.4)
MCV RBC AUTO: 85 FL (ref 82–98)
MONOCYTES # BLD AUTO: 1.08 THOUSAND/ΜL (ref 0.17–1.22)
MONOCYTES NFR BLD AUTO: 10 % (ref 4–12)
NEUTROPHILS # BLD AUTO: 8.16 THOUSANDS/ΜL (ref 1.85–7.62)
NEUTS SEG NFR BLD AUTO: 72 % (ref 43–75)
NRBC BLD AUTO-RTO: 0 /100 WBCS
PLATELET # BLD AUTO: 231 THOUSANDS/UL (ref 149–390)
PMV BLD AUTO: 10.1 FL (ref 8.9–12.7)
RBC # BLD AUTO: 3.37 MILLION/UL (ref 3.81–5.12)
WBC # BLD AUTO: 11.27 THOUSAND/UL (ref 4.31–10.16)

## 2020-01-25 PROCEDURE — 3E033VJ INTRODUCTION OF OTHER HORMONE INTO PERIPHERAL VEIN, PERCUTANEOUS APPROACH: ICD-10-PCS | Performed by: OBSTETRICS & GYNECOLOGY

## 2020-01-25 PROCEDURE — 85025 COMPLETE CBC W/AUTO DIFF WBC: CPT | Performed by: OBSTETRICS & GYNECOLOGY

## 2020-01-25 PROCEDURE — 4A1HX4Z MONITORING OF PRODUCTS OF CONCEPTION, CARDIAC ELECTRICAL ACTIVITY, EXTERNAL APPROACH: ICD-10-PCS | Performed by: OBSTETRICS & GYNECOLOGY

## 2020-01-25 RX ORDER — DIAPER,BRIEF,INFANT-TODD,DISP
EACH MISCELLANEOUS 4 TIMES DAILY PRN
Status: DISCONTINUED | OUTPATIENT
Start: 2020-01-25 | End: 2020-01-28 | Stop reason: HOSPADM

## 2020-01-25 RX ADMIN — HYDROMORPHONE HYDROCHLORIDE 0.5 MG: 1 INJECTION, SOLUTION INTRAMUSCULAR; INTRAVENOUS; SUBCUTANEOUS at 08:03

## 2020-01-25 RX ADMIN — HYDROMORPHONE HYDROCHLORIDE 0.5 MG: 1 INJECTION, SOLUTION INTRAMUSCULAR; INTRAVENOUS; SUBCUTANEOUS at 06:03

## 2020-01-25 RX ADMIN — LEVETIRACETAM 1000 MG: 500 TABLET, FILM COATED ORAL at 20:58

## 2020-01-25 RX ADMIN — DOCUSATE SODIUM 100 MG: 100 CAPSULE, LIQUID FILLED ORAL at 18:47

## 2020-01-25 RX ADMIN — OXYCODONE HYDROCHLORIDE AND ACETAMINOPHEN 1 TABLET: 5; 325 TABLET ORAL at 18:47

## 2020-01-25 RX ADMIN — DOCUSATE SODIUM 100 MG: 100 CAPSULE, LIQUID FILLED ORAL at 08:03

## 2020-01-25 RX ADMIN — HYDROCORTISONE: 1 CREAM TOPICAL at 15:38

## 2020-01-25 RX ADMIN — SIMETHICONE CHEW TAB 80 MG 80 MG: 80 TABLET ORAL at 17:02

## 2020-01-25 RX ADMIN — SIMETHICONE CHEW TAB 80 MG 80 MG: 80 TABLET ORAL at 23:30

## 2020-01-25 RX ADMIN — OXYCODONE HYDROCHLORIDE AND ACETAMINOPHEN 2 TABLET: 5; 325 TABLET ORAL at 23:30

## 2020-01-25 RX ADMIN — SIMETHICONE CHEW TAB 80 MG 80 MG: 80 TABLET ORAL at 09:35

## 2020-01-25 RX ADMIN — DIPHENHYDRAMINE HYDROCHLORIDE 25 MG: 50 INJECTION, SOLUTION INTRAMUSCULAR; INTRAVENOUS at 10:02

## 2020-01-25 RX ADMIN — LEVETIRACETAM 1000 MG: 500 TABLET, FILM COATED ORAL at 08:03

## 2020-01-25 RX ADMIN — OXYCODONE HYDROCHLORIDE AND ACETAMINOPHEN 1 TABLET: 5; 325 TABLET ORAL at 14:04

## 2020-01-25 RX ADMIN — DIPHENHYDRAMINE HYDROCHLORIDE 25 MG: 50 INJECTION, SOLUTION INTRAMUSCULAR; INTRAVENOUS at 10:05

## 2020-01-25 RX ADMIN — SIMETHICONE CHEW TAB 80 MG 80 MG: 80 TABLET ORAL at 12:14

## 2020-01-25 NOTE — OP NOTE
OPERATIVE REPORT  PATIENT NAME: Diana Murphy    :  2000  MRN: [id number]  Pt Location: [facility] L[facility]D OR ROOM 02    SURGERY DATE: 2020    Surgeon(s) and Role:     * Marielle Shepherd MD - Primary    Preop Diagnosis:  Elective surgical procedure [Z41 9]    Post-Op Diagnosis Codes:     * Elective surgical procedure [Z41 9]    Procedure(s) (LRB):   SECTION () (N/A)    Specimen(s):  ID Type Source Tests Collected by Time Destination   A :  Tissue (Placenta on Hold) OB Only Placenta PLACENTA IN STORAGE Marielle Shepherd MD 2020 1053    B :  Blood, Arterial Cord BLOOD GAS, VENOUS, CORD, BLOOD GAS, ARTERIAL, CORD Marielle Shepherd MD 2020 1050        Estimated Blood Loss:    ml    Drains:  Urethral Catheter Non-latex 16 Fr  (Active)   Site Assessment Clean;Skin intact 2020  8:00 PM   Collection Container Standard drainage bag 2020  8:00 PM   Securement Method Securing device (Describe) 2020  8:00 PM   Output (mL) 450 mL 2020  9:15 PM   Number of days: 1       Anesthesia Type:   * spinal anesthesia *    Operative Indications:  Elective surgical procedure [Z41 9]  Maternal request  Seizure disease during pregnancy    Operative Findings:  External genitalia WNL, Uterus term in size no mass and lesion appreciated  B/L tubes and ovaries WNL  Complications:   None    Procedure and Technique:  Operative Findings:  1  Viable female  at 36 with APGARs of 9 and 9 at 1 and 5 minutes  Fetus weighted 7lb 2 2oz   2  Normal intact placenta with centrally inserted 3VC expressed at 1051  3  Normal uterus, bilateral tubes and ovaries  4  Blood gases:   Arterial pH: 7 313   Arterial base excess: -1 8   Venous pH: 7 367   Venous base excess: -1 9    The patient was taken to the operating room  Spinal anesthesia was adequately established and Ancef 2 g IV was given for preoperative prophylaxis    The patient was then placed in the dorsal supine position with a left tilt of the hips  The patient was then prepped with betadine for vaginal prep and chloraprep for abdominal prep and draped in the usual sterile fashion for a pfannenstiel skin incision  A time out was performed to confirm correct patient and correct procedure  An incision was made in the skin with a surgical scalpel and sharp dissection was carried out over subsequent layers of tissue including the fascia, followed by the Bovie electrocautery for hemostasis  The fascia was incised at the midline and the fascial incision was extended bilaterally using the curved Young scissors  The superior edge of the fascial incision was grasped with Kocher clamps, tented up and the underlying rectus muscles were dissected off bluntly and sharply using the Metzenbaum scissor and scalpel  The rectus muscles were then divided at midline and the peritoneum was identified, tented up at its upper margin taking care to avoid the bladder, and then entered  The peritoneal incision was extended superiorly and inferiorly  The bladder blade was inserted and the vesicouterine peritoneum was identified, grasped with forceps and cut laterally to both sides using the Metzenbaum scissors  A bladder blade was reinserted and a transverse incision was made in the lower uterine segment using a new surgical blade  The uterine incision was extended cephalad and caudal using blunt dissection  The amniotic sac was entered and the amniotic fluid was noted to be clear   The surgeon's hand was placed into the uterine cavity  The fetal head was identified and elevated through the uterine incision with the assistance of fundal pressure  With gentle traction, the shoulder was delivered, followed by the rest of the fetal body  There was no nuchal cord noted  On delivery the cord was doubly clamped and cut after delayed cord clamping  The infant was then passed off the table to the awaiting  staff   The  was noted to cry spontaneously and moved all extremities  Venous and arterial blood gas, cord blood, and portion of cord was obtained for analysis and routine blood testing  The placenta delivery was then sent to storage  Placenta was noted to be intact with a centrally inserted three-vessel cord  Oxytocin was administered by IV infusion to enhance uterine contraction  The uterus was exteriorized and cleared of all clots and remaining products of conception  The uterine incision was re approximated using a 0 vicryl in a running locked fashion  A second vertical imbricating stitch with 0 vicryl was applied  The uterine incision was examined and noted to be hemostatic  The posterior cul-de-sac was cleared of all clots and products of conception  The uterus was replaced into the abdomen and the pericolic gutters were cleared of all clots  The uterine incision was once again reexamined and noted to be hemostatic  Rectus muscle reappointed via plane suture  The fascia was re approximated using 0 vicryl in a running nonlocked fashion  The subcutaneous tissue was irrigated and cleared of all clots and debris  Good hemostasis was noted with Bovie electrocautery  The subcutaneous  tissue was reapproximated with plane suture     The skin incision was closed using 4 0 with Monocryl  Good hemostasis was noted  Patient tolerated the procedure well  All needle, sponge, and instrument counts were noted to be correct x 2 at the end of the procedure  Patient was transferred to the recovery room in stable condition  Dr Lorenzo Rater was present for the procedure       I was present for the entire procedure    Patient Disposition:  PACU     SIGNATURE: Willy Mcgovern MD  DATE: January 25, 2020  TIME: 12:18 AM

## 2020-01-25 NOTE — PROGRESS NOTES
Notified Dr Naima Veras that pt and pt's family is requesting to have pt re-evaluated because of chest pain and chest pressure

## 2020-01-25 NOTE — PROGRESS NOTES
Post- Progress note    Patient is post-op day 1 from a Primary Low Transverse  delivery  Pain: yes--controlled with Dilaudid which is also causing extreme itching  Tolerating Oral Intake: yes  Voiding: yes  Flatus: yes  Bowel Movement: no  Ambulating: yes--with assistance;   Passed out when tried to go to bathroom by herself this morning  Breastfeeding: Breastfeeding  Chest Pain: yes---reports pressure in her chest when sitting and standing  Shortness of Breath: yes  Leg Pain/Discomfort: no  Lochia: normal  Other: sedated from narcotics and benadryl    Objective:   Vitals:    20 1156   BP: 113/80   Pulse: (!) 145   Resp: 16   Temp:    SpO2: 95%       Intake/Output Summary (Last 24 hours) at 2020 1235  Last data filed at 2020 0603  Gross per 24 hour   Intake 2000 ml   Output 3088 ml   Net -1088 ml       Physical Exam:  General:oriented to place   Cardiovascular: Cor RRR  Lungs: clear to auscultation bilaterally  Abdomen: moderate tenderness in the in the epigastrium and incsion  Fundus: appropriately tender to palpation, 1 below the umbilicus  Incision: clean, dry, and intact  Lower extremeties: nontender      Labs/Tests:   Admission on 2020   Component Date Value    ABO Grouping 2020 O     Rh Factor 2020 Positive     Antibody Screen 2020 Negative     Specimen Expiration Date 2020 [id number]     WBC 2020 9 11     RBC 2020 3 78*    Hemoglobin 2020 10 3*    Hematocrit 2020 31 8*    MCV 2020 84     MCH 2020 27 2     MCHC 2020 32 4     RDW 2020 17 4*    MPV 2020 10 3     Platelets  253     nRBC 2020 0     Neutrophils Relative 2020 61     Immat GRANS % 2020 1     Lymphocytes Relative 2020 26     Monocytes Relative 2020 11     Eosinophils Relative 2020 1     Basophils Relative 2020 0     Neutrophils Absolute 2020 5 55     Immature Grans Absolute 2020 0 10     Lymphocytes Absolute 2020 2 37     Monocytes Absolute 2020 0 99     Eosinophils Absolute 2020 0 08     Basophils Absolute 2020 0 02     RPR 2020 Non-Reactive     Amph/Meth UR 2020 Negative     Barbiturate Ur 2020 Negative     Benzodiazepine Urine 2020 Negative     Cocaine Urine 2020 Negative     Methadone Urine 2020 Negative     Opiate Urine 2020 Negative     PCP Ur 2020 Negative     THC Urine 2020 Negative     pH, Cord Leon 2020 7 367     pCO2, Cord Leon 2020 41 6     pO2, Cord Leon 2020 31 4     HCO3, Cord Leon 2020 23 3     Base Exc, Cord Leon 2020 -1 9*    O2 Cont, Cord Leon 2020 13 2     O2 HGB,VENOUS CORD 2020 72 5     pH, Cord Art 2020 7  313     pCO2, Cord Art 2020 50 4     pO2, Cord Art 2020 19 1     HCO3, Cord Art 2020 25 0     Base Exc, Cord Art 2020 -1 8*    O2 Content, Cord Art 2020 7 2     O2 Hgb, Arterial Cord 2020 38 2     WBC 2020 11 27*    RBC 2020 3 37*    Hemoglobin 2020 9 1*    Hematocrit 2020 28 6*    MCV 2020 85     MCH 2020 27 0     MCHC 2020 31 8     RDW 2020 17 4*    MPV 2020 10 1     Platelets 5571 231     nRBC 2020 0     Neutrophils Relative 2020 72     Immat GRANS % 2020 1     Lymphocytes Relative 2020 16     Monocytes Relative 2020 10     Eosinophils Relative 2020 1     Basophils Relative 2020 0     Neutrophils Absolute 2020 8 16*    Immature Grans Absolute 2020 0 09     Lymphocytes Absolute 2020 1 85     Monocytes Absolute 2020 1 08     Eosinophils Absolute 2020 0 08     Basophils Absolute 2020 0 01            Assessment and Plan:  20y o  year-old , postoperative day 1 status-post Primary Low Transverse  delivery    Continue routine postpartum care  Advance diet as tolerated  Encourage ambulation with assistance  Lungs clear and breathing without difficulty, pulse ox normal on room air - suspect chest pressure due to abdominal surgery yesterday with trapped air under diaphragm    Felipe Rod MD

## 2020-01-25 NOTE — PLAN OF CARE
Problem: Potential for Falls  Goal: Patient will remain free of falls  Description  INTERVENTIONS:  - Assess patient frequently for physical needs  -  Identify cognitive and physical deficits and behaviors that affect risk of falls  -  [place] fall precautions as indicated by assessment   - Educate patient/family on patient safety including physical limitations  - Instruct patient to call for assistance with activity based on assessment  - Modify environment to reduce risk of injury  - Consider OT/PT consult to assist with strengthening/mobility  Outcome: Progressing     Problem: POSTPARTUM  Goal: Experiences normal postpartum course  Description  INTERVENTIONS:  - Monitor maternal vital signs  - Assess uterine involution and lochia  Outcome: Progressing  Goal: Appropriate maternal -  bonding  Description  INTERVENTIONS:  - Identify family support  - Assess for appropriate maternal/infant bonding   -Encourage maternal/infant bonding opportunities  - Referral to  or  as needed  Outcome: Progressing  Goal: Establishment of infant feeding pattern  Description  INTERVENTIONS:  - Assess breast/bottle feeding  - Refer to lactation as needed  Outcome: Progressing  Goal: Incision(s), wounds(s) or drain site(s) healing without S/S of infection  Description  INTERVENTIONS  - Assess and document risk factors for skin impairment   - Assess and document dressing, incision, wound bed, drain sites and surrounding tissue  - Consider nutrition services referral as needed  - Oral mucous membranes remain intact  - Provide patient/ family education  Outcome: Progressing     Problem: PAIN -   Goal: Displays adequate comfort level or baseline comfort level  Description  INTERVENTIONS:  - Perform pain scoring using age-appropriate tool with hands-on care as needed    Notify physician/AP of high pain scores not responsive to comfort measures  - Administer analgesics based on type and severity of pain and evaluate response  - Sucrose analgesia per protocol for brief minor painful procedures  - Teach parents interventions for comforting infant  Outcome: Progressing     Problem: THERMOREGULATION - /PEDIATRICS  Goal: Maintains normal body temperature  Description  Interventions:  - Monitor temperature (axillary for Newborns) as ordered  - Monitor for signs of hypothermia or hyperthermia  - Provide thermal support measures  - Wean to open crib when appropriate  Outcome: Progressing     Problem: INFECTION -   Goal: No evidence of infection  Description  INTERVENTIONS:  - Instruct family/visitors to use good hand hygiene technique  - Identify and instruct in appropriate isolation precautions for identified infection/condition  - Change incubator every 2 weeks or as needed  - Monitor for symptoms of infection  - Monitor surgical sites and insertion sites for all indwelling lines, tubes, and drains for drainage, redness, or edema   - Monitor endotracheal and nasal secretions for changes in amount and color  - Monitor culture and CBC results  - Administer antibiotics as ordered  Monitor drug levels  Outcome: Progressing     Problem: RISK FOR INFECTION (RISK FACTORS FOR MATERNAL CHORIOAMNIOITIS - )  Goal: No evidence of infection  Description  INTERVENTIONS:  - Instruct family/visitors to use good hand hygiene technique  - Monitor for symptoms of infection  - Monitor culture and CBC results  - Administer antibiotics as ordered    Monitor drug levels  Outcome: Progressing     Problem: SAFETY -   Goal: Patient will remain free from falls  Description  INTERVENTIONS:  - Instruct family/caregiver on patient safety  - Keep incubator doors and portholes closed when unattended  - Keep radiant warmer side rails and crib rails up when unattended  - Based on caregiver fall risk screen, instruct family/caregiver to ask for assistance with transferring infant if caregiver noted to have fall risk factors  Outcome: Progressing     Problem: Knowledge Deficit  Goal: Patient/family/caregiver demonstrates understanding of disease process, treatment plan, medications, and discharge instructions  Description  Complete learning assessment and assess knowledge base    Interventions:  - Provide teaching at level of understanding  - Provide teaching via preferred learning methods  Outcome: Progressing  Goal: Infant caregiver verbalizes understanding of benefits of skin-to-skin with healthy   Description  Prior to delivery, educate patient regarding skin-to-skin practice and its benefits  Initiate immediate and uninterrupted skin-to-skin contact after birth until breastfeeding is initiated or a minimum of one hour  Encourage continued skin-to-skin contact throughout the post partum stay    Outcome: Progressing  Goal: Infant caregiver verbalizes understanding of benefits and management of breastfeeding their healthy   Description  Help initiate breastfeeding within one hour of birth  Educate/assist with breastfeeding positioning and latch  Educate on safe positioning and to monitor their  for safety  Educate on how to maintain lactation even if they are  from their   Educate/initiate pumping for a mom with a baby in the NICU within 6 hours after birth  Give infants no food or drink other than breast milk unless medically indicated  Educate on feeding cues and encourage breastfeeding on demand    Outcome: Progressing  Goal: Infant caregiver verbalizes understanding of benefits to rooming-in with their healthy   Description  Promote rooming in 23 out of 24 hours per day  Educate on benefits to rooming-in  Provide  care in room with parents as long as infant and mother condition allow    Outcome: Progressing  Goal: Provide formula feeding instructions and preparation information to caregivers who do not wish to breastfeed their   Description  Provide one on one information on frequency, amount, and burping for formula feeding caregivers throughout their stay and at discharge  Provide written information/video on formula preparation  Outcome: Progressing  Goal: Infant caregiver verbalizes understanding of support and resources for follow up after discharge  Description  Provide individual discharge education on when to call the doctor  Provide resources and contact information for post-discharge support      Outcome: Progressing     Problem: DISCHARGE PLANNING  Goal: Discharge to home or other facility with appropriate resources  Description  INTERVENTIONS:  - Identify barriers to discharge w/patient and caregiver  - Arrange for needed discharge resources and transportation as appropriate  - Identify discharge learning needs (meds, wound care, etc )  - Arrange for interpretive services to assist at discharge as needed  - Refer to Case Management Department for coordinating discharge planning if the patient needs post-hospital services based on physician/advanced practitioner order or complex needs related to functional status, cognitive ability, or social support system  Outcome: Progressing

## 2020-01-25 NOTE — PROGRESS NOTES
Found pt on toilet and pt was unresponsive applied ammonia inhalent and pt came around and pt became alert and oriented x 4  Transferred pt from toilet to wheelchair and from wheelchair to chair  Notified Kandice Cano about pt condition and request her to come and evaluate the pt  Pt was c/o chest pain and tightness and that she has been explaining this feeling since yesterday

## 2020-01-25 NOTE — PROGRESS NOTES
Pt c/o centered chest pressure and pain that radiated from center of chest under right breast   Asseted pt at 0900  Listen pt to her lungs and all fields are clear  Vitals are stable except for pulse rate, gave her simethicone for gas relief  and Notified Dr Nataliia Rey  about pt complains and all results and interventions with pt

## 2020-01-25 NOTE — PLAN OF CARE
Problem: Potential for Falls  Goal: Patient will remain free of falls  Description  INTERVENTIONS:  - Assess patient frequently for physical needs  -  Identify cognitive and physical deficits and behaviors that affect risk of falls  -  [place] fall precautions as indicated by assessment   - Educate patient/family on patient safety including physical limitations  - Instruct patient to call for assistance with activity based on assessment  - Modify environment to reduce risk of injury  - Consider OT/PT consult to assist with strengthening/mobility  Outcome: Progressing     Problem: POSTPARTUM  Goal: Experiences normal postpartum course  Description  INTERVENTIONS:  - Monitor maternal vital signs  - Assess uterine involution and lochia  Outcome: Progressing  Goal: Appropriate maternal -  bonding  Description  INTERVENTIONS:  - Identify family support  - Assess for appropriate maternal/infant bonding   -Encourage maternal/infant bonding opportunities  - Referral to  or  as needed  Outcome: Progressing  Goal: Establishment of infant feeding pattern  Description  INTERVENTIONS:  - Assess breast/bottle feeding  - Refer to lactation as needed  Outcome: Progressing  Goal: Incision(s), wounds(s) or drain site(s) healing without S/S of infection  Description  INTERVENTIONS  - Assess and document risk factors for skin impairment   - Assess and document dressing, incision, wound bed, drain sites and surrounding tissue  - Consider nutrition services referral as needed  - Oral mucous membranes remain intact  - Provide patient/ family education  Outcome: Progressing     Problem: PAIN -   Goal: Displays adequate comfort level or baseline comfort level  Description  INTERVENTIONS:  - Perform pain scoring using age-appropriate tool with hands-on care as needed    Notify physician/AP of high pain scores not responsive to comfort measures  - Administer analgesics based on type and severity of pain and evaluate response  - Sucrose analgesia per protocol for brief minor painful procedures  - Teach parents interventions for comforting infant  Outcome: Progressing     Problem: THERMOREGULATION - /PEDIATRICS  Goal: Maintains normal body temperature  Description  Interventions:  - Monitor temperature (axillary for Newborns) as ordered  - Monitor for signs of hypothermia or hyperthermia  - Provide thermal support measures  - Wean to open crib when appropriate  Outcome: Progressing     Problem: INFECTION -   Goal: No evidence of infection  Description  INTERVENTIONS:  - Instruct family/visitors to use good hand hygiene technique  - Identify and instruct in appropriate isolation precautions for identified infection/condition  - Change incubator every 2 weeks or as needed  - Monitor for symptoms of infection  - Monitor surgical sites and insertion sites for all indwelling lines, tubes, and drains for drainage, redness, or edema   - Monitor endotracheal and nasal secretions for changes in amount and color  - Monitor culture and CBC results  - Administer antibiotics as ordered  Monitor drug levels  Outcome: Progressing     Problem: RISK FOR INFECTION (RISK FACTORS FOR MATERNAL CHORIOAMNIOITIS - )  Goal: No evidence of infection  Description  INTERVENTIONS:  - Instruct family/visitors to use good hand hygiene technique  - Monitor for symptoms of infection  - Monitor culture and CBC results  - Administer antibiotics as ordered    Monitor drug levels  Outcome: Progressing     Problem: SAFETY -   Goal: Patient will remain free from falls  Description  INTERVENTIONS:  - Instruct family/caregiver on patient safety  - Keep incubator doors and portholes closed when unattended  - Keep radiant warmer side rails and crib rails up when unattended  - Based on caregiver fall risk screen, instruct family/caregiver to ask for assistance with transferring infant if caregiver noted to have fall risk factors  Outcome: Progressing     Problem: Knowledge Deficit  Goal: Patient/family/caregiver demonstrates understanding of disease process, treatment plan, medications, and discharge instructions  Description  Complete learning assessment and assess knowledge base    Interventions:  - Provide teaching at level of understanding  - Provide teaching via preferred learning methods  Outcome: Progressing  Goal: Infant caregiver verbalizes understanding of benefits of skin-to-skin with healthy   Description  Prior to delivery, educate patient regarding skin-to-skin practice and its benefits  Initiate immediate and uninterrupted skin-to-skin contact after birth until breastfeeding is initiated or a minimum of one hour  Encourage continued skin-to-skin contact throughout the post partum stay    Outcome: Progressing  Goal: Infant caregiver verbalizes understanding of benefits and management of breastfeeding their healthy   Description  Help initiate breastfeeding within one hour of birth  Educate/assist with breastfeeding positioning and latch  Educate on safe positioning and to monitor their  for safety  Educate on how to maintain lactation even if they are  from their   Educate/initiate pumping for a mom with a baby in the NICU within 6 hours after birth  Give infants no food or drink other than breast milk unless medically indicated  Educate on feeding cues and encourage breastfeeding on demand    Outcome: Progressing  Goal: Infant caregiver verbalizes understanding of benefits to rooming-in with their healthy   Description  Promote rooming in 23 out of 24 hours per day  Educate on benefits to rooming-in  Provide  care in room with parents as long as infant and mother condition allow    Outcome: Progressing  Goal: Provide formula feeding instructions and preparation information to caregivers who do not wish to breastfeed their   Description  Provide one on one information on frequency, amount, and burping for formula feeding caregivers throughout their stay and at discharge  Provide written information/video on formula preparation  Outcome: Progressing  Goal: Infant caregiver verbalizes understanding of support and resources for follow up after discharge  Description  Provide individual discharge education on when to call the doctor  Provide resources and contact information for post-discharge support      Outcome: Progressing     Problem: DISCHARGE PLANNING  Goal: Discharge to home or other facility with appropriate resources  Description  INTERVENTIONS:  - Identify barriers to discharge w/patient and caregiver  - Arrange for needed discharge resources and transportation as appropriate  - Identify discharge learning needs (meds, wound care, etc )  - Arrange for interpretive services to assist at discharge as needed  - Refer to Case Management Department for coordinating discharge planning if the patient needs post-hospital services based on physician/advanced practitioner order or complex needs related to functional status, cognitive ability, or social support system  Outcome: Progressing

## 2020-01-25 NOTE — DISCHARGE INSTRUCTIONS
Section   WHAT YOU SHOULD KNOW:   A  delivery, or , is abdominal surgery to deliver your baby  There are many reasons you may need a   · A  may be scheduled before labor if you had a  with your last baby  It may be scheduled if your baby is not positioned normally, or you are pregnant with more than 1 baby  · Your caregiver may perform an emergency  during labor to prevent life-threatening complications for you or your baby  A  may be done if your cervix does not dilate after several hours of active labor  · Other reasons for a  include maternal infections and problems with the placenta  AFTER YOU LEAVE:   Medicines:   · Prescription pain medicine  may be given  Ask how to take this medicine safely  · Acetaminophen  decreases pain and fever  It is available without a doctor's order  Ask how much to take and how often to take it  Follow directions  Acetaminophen can cause liver damage if not taken correctly  · NSAIDs  help decrease swelling and pain or fever  This medicine is available with or without a doctor's order  NSAIDs can cause stomach bleeding or kidney problems in certain people  If you take blood thinner medicine, always ask your obstetrician if NSAIDs are safe for you  Always read the medicine label and follow directions  · Take your medicine as directed  Contact your obstetrician (OB) if you think your medicine is not helping or if you have side effects  Tell him if you are allergic to any medicine  Keep a list of the medicines, vitamins, and herbs you take  Include the amounts, and when and why you take them  Bring the list or the pill bottles to follow-up visits  Carry your medicine list with you in case of an emergency  Follow up with your OB as directed: You may need to return to have your stitches or staples removed  Write down your questions so you remember to ask them during your visits    Wound care:  Carefully wash your wound with soap and water every day  Keep your wound clean and dry  Wear loose, comfortable clothes that do not rub against your wound  Ask your OB about bathing and showering  Drink plenty of liquids: You can lower your risk for a blood clot if you drink plenty of liquids  Ask how much liquid to drink each day and which liquids are best for you  Limit activity until you have fully recovered from surgery:   · Ask when it is safe for you to drive, walk up stairs, lift heavy objects, and have sex  · Ask when it is okay to exercise, and what types of exercise to do  Start slowly and do more as you get stronger  Contact your OB if:   · You have heavy vaginal bleeding that fills 1 or more sanitary pads in 1 hour  · You have a fever  · Your incision is swollen, red, or draining pus  · You have questions or concerns about yourself or your baby  Seek care immediately or call 911 if:   · Blood soaks through your bandage  · Your stitches come apart  · You feel lightheaded, short of breath, and have chest pain  · You cough up blood  · Your arm or leg feels warm, tender, and painful  It may look swollen and red  © 2014 [address] is for End User's use only and may not be sold, redistributed or otherwise used for commercial purposes  All illustrations and images included in CareNotes® are the copyrighted property of A [employer]  or [employer]  The above information is an  only  It is not intended as medical advice for individual conditions or treatments  Talk to your doctor, nurse or pharmacist before following any medical regimen to see if it is safe and effective for you  Breastfeeding and Breast Engorgement   WHAT YOU NEED TO KNOW:   Breast engorgement develops when too much milk builds up in your breast  It is normal for your breasts to feel swollen, heavy, and tender when your milk comes in   This is called breast fullness  When your breast starts to feel painful and hard, the fullness has developed into engorgement  Breast engorgement usually happens 3 to 5 days after you give birth  Engorgement can happen if you are not breastfeeding or expressing milk often, or produce a lot of milk  Your baby may have a hard time latching on (attaching) to your breast to feed  Without treatment, engorgement can lead to plugged milk ducts or a breast infection called mastitis  DISCHARGE INSTRUCTIONS:   Return to the emergency department if:   · You have a fever with chills or body aches  · You have pain and swelling in one or both breasts that keeps you from breastfeeding  Contact your healthcare provider if:   · You have a tender breast lump that grows slowly and usually forms on one side of your breast     · You have a small, white bump on your nipple  · Your symptoms do not get better within 24 hours  · You have questions or concerns about your condition or care  Manage your symptoms:   · Breastfeed or pump every 2 or 3 hours  Frequent breastfeeding helps decrease engorgement discomfort  Express or pump milk from your breasts before you breastfeed  This will help soften your breast and your nipple, and allow your baby to latch on better  · Empty your breasts completely  Take your time when you breastfeed to allow your baby to empty your breast  Try not to switch breasts too early  Express or pump after you breastfeed if your baby is not emptying your breasts when he feeds  · Massage your breast   Breast massage helps empty your engorged breast and decrease pain  Gently massage your breast before and during breastfeeding to help increase your milk flow  Gently stroke your breast, starting from the outer areas and working your way toward the nipple  Breast massage may also help prevent breast engorgement if done in the first few days after you give birth  · Apply a cool compress in between feedings  The cold may help decrease swelling and pain in your engorged breast  Wet a washcloth in cold water, wring it out, and place it on your breast  Ask how long and how often to use a cool compress  · Wear a supportive bra  The bra should fit well but not be too tight  · Apply warmth to your breast before you breastfeed  Put a warm, wet cloth on your breast or take a warm shower  This can help increase your milk flow  Follow up with your healthcare provider as directed:  Write down your questions so you remember to ask them during your visits  For more information:   · [employer] [address], [place] [address]  Phone: 4- 261 - 990-4424  Web Address: http://[employer]/  · [address] [place]  Phone: 2- 581 - 125-6414  Phone: 2- 772 - 368-1650  Web Address: http://www [employer]/  org  © 2017 [facility] Information is for End User's use only and may not be sold, redistributed or otherwise used for commercial purposes  All illustrations and images included in CareNotes® are the copyrighted property of [employer]  or [employer]  The above information is an  only  It is not intended as medical advice for individual conditions or treatments  Talk to your doctor, nurse or pharmacist before following any medical regimen to see if it is safe and effective for you

## 2020-01-25 NOTE — DISCHARGE SUMMARY
Discharge Summary - OB/GYN   Sundar Allen 20 y o  female MRN: [id number]  Unit/Bed#: -01 Encounter: [id number]      Admission Date: 2020     Discharge Date: 2020    Admitting Diagnosis:   1  Pregnancy at 39w0d  2  Seizure disorder    Discharge Diagnosis:   Same, delivered      Procedures: primary  section, low transverse incision    Attending: Adina Reeves MD    Hospital Course:     Sundar Allen is a 20 y o   at 39w0d wks who was initially admitted for elective ceserean section  She delivered a viable female  on 2020 at 35  Weight 7lbs 2 2oz via primary  section, low transverse incision  Apgars were 9 (1 min) and 9 (5 min)   was transferred to  nursery  Patient tolerated the procedure well and was transferred to recovery in stable condition  Her post-operative course was complicated by none  Preoperative hemaglobin was 10 3, postoperative was 9 1  Her postoperative pain was well controlled with oral analgesics  On day of discharge, she was ambulating and able to reasonably perform all ADLs  She was voiding and had appropriate bowel function  Pain was well controlled  She was discharged home on post-operative day #3 without complications  Patient was instructed to follow up with her OB as an outpatient and was given appropriate warnings to call provider if she develops signs of infection or uncontrolled pain  Complications: none apparent    Condition at discharge: good     Discharge instructions/Information to patient and family:   See after visit summary for information provided to patient and family  Provisions for Follow-Up Care:  See after visit summary for information related to follow-up care and any pertinent home health orders  Disposition: Home    Planned Readmission: No    Discharge Medications: For a complete list of the patient's medications, please refer to her med rec        Tammy Phillips MD  OBGYN, PGY-2  1/28/2020  7:08 AM

## 2020-01-26 RX ORDER — LORAZEPAM 0.5 MG/1
0.5 TABLET ORAL EVERY 8 HOURS PRN
Status: DISCONTINUED | OUTPATIENT
Start: 2020-01-26 | End: 2020-01-28 | Stop reason: HOSPADM

## 2020-01-26 RX ADMIN — OXYCODONE HYDROCHLORIDE AND ACETAMINOPHEN 2 TABLET: 5; 325 TABLET ORAL at 12:40

## 2020-01-26 RX ADMIN — SIMETHICONE CHEW TAB 80 MG 80 MG: 80 TABLET ORAL at 19:33

## 2020-01-26 RX ADMIN — OXYCODONE HYDROCHLORIDE AND ACETAMINOPHEN 2 TABLET: 5; 325 TABLET ORAL at 16:49

## 2020-01-26 RX ADMIN — OXYCODONE HYDROCHLORIDE AND ACETAMINOPHEN 1 TABLET: 5; 325 TABLET ORAL at 22:54

## 2020-01-26 RX ADMIN — DOCUSATE SODIUM 100 MG: 100 CAPSULE, LIQUID FILLED ORAL at 18:22

## 2020-01-26 RX ADMIN — LEVETIRACETAM 1000 MG: 500 TABLET, FILM COATED ORAL at 08:15

## 2020-01-26 RX ADMIN — LEVETIRACETAM 1000 MG: 500 TABLET, FILM COATED ORAL at 20:26

## 2020-01-26 RX ADMIN — DOCUSATE SODIUM 100 MG: 100 CAPSULE, LIQUID FILLED ORAL at 08:14

## 2020-01-26 RX ADMIN — SIMETHICONE CHEW TAB 80 MG 80 MG: 80 TABLET ORAL at 08:14

## 2020-01-26 RX ADMIN — LORAZEPAM 0.5 MG: 0.5 TABLET ORAL at 22:33

## 2020-01-26 RX ADMIN — OXYCODONE HYDROCHLORIDE AND ACETAMINOPHEN 2 TABLET: 5; 325 TABLET ORAL at 08:14

## 2020-01-26 RX ADMIN — SIMETHICONE CHEW TAB 80 MG 80 MG: 80 TABLET ORAL at 12:40

## 2020-01-26 RX ADMIN — HYDROCORTISONE: 1 CREAM TOPICAL at 19:30

## 2020-01-26 RX ADMIN — OXYCODONE HYDROCHLORIDE AND ACETAMINOPHEN 2 TABLET: 5; 325 TABLET ORAL at 03:31

## 2020-01-26 NOTE — PLAN OF CARE
Problem: Potential for Falls  Goal: Patient will remain free of falls  Description  INTERVENTIONS:  - Assess patient frequently for physical needs  -  Identify cognitive and physical deficits and behaviors that affect risk of falls  -  [place] fall precautions as indicated by assessment   - Educate patient/family on patient safety including physical limitations  - Instruct patient to call for assistance with activity based on assessment  - Modify environment to reduce risk of injury  - Consider OT/PT consult to assist with strengthening/mobility  Outcome: Progressing     Problem: POSTPARTUM  Goal: Experiences normal postpartum course  Description  INTERVENTIONS:  - Monitor maternal vital signs  - Assess uterine involution and lochia  Outcome: Progressing  Goal: Appropriate maternal -  bonding  Description  INTERVENTIONS:  - Identify family support  - Assess for appropriate maternal/infant bonding   -Encourage maternal/infant bonding opportunities  - Referral to  or  as needed  Outcome: Progressing  Goal: Establishment of infant feeding pattern  Description  INTERVENTIONS:  - Assess breast/bottle feeding  - Refer to lactation as needed  Outcome: Progressing  Goal: Incision(s), wounds(s) or drain site(s) healing without S/S of infection  Description  INTERVENTIONS  - Assess and document risk factors for skin impairment   - Assess and document dressing, incision, wound bed, drain sites and surrounding tissue  - Consider nutrition services referral as needed  - Oral mucous membranes remain intact  - Provide patient/ family education  Outcome: Progressing     Problem: PAIN -   Goal: Displays adequate comfort level or baseline comfort level  Description  INTERVENTIONS:  - Perform pain scoring using age-appropriate tool with hands-on care as needed    Notify physician/AP of high pain scores not responsive to comfort measures  - Administer analgesics based on type and severity of pain and evaluate response  - Sucrose analgesia per protocol for brief minor painful procedures  - Teach parents interventions for comforting infant  Outcome: Progressing     Problem: THERMOREGULATION - /PEDIATRICS  Goal: Maintains normal body temperature  Description  Interventions:  - Monitor temperature (axillary for Newborns) as ordered  - Monitor for signs of hypothermia or hyperthermia  - Provide thermal support measures  - Wean to open crib when appropriate  Outcome: Progressing     Problem: INFECTION -   Goal: No evidence of infection  Description  INTERVENTIONS:  - Instruct family/visitors to use good hand hygiene technique  - Identify and instruct in appropriate isolation precautions for identified infection/condition  - Change incubator every 2 weeks or as needed  - Monitor for symptoms of infection  - Monitor surgical sites and insertion sites for all indwelling lines, tubes, and drains for drainage, redness, or edema   - Monitor endotracheal and nasal secretions for changes in amount and color  - Monitor culture and CBC results  - Administer antibiotics as ordered  Monitor drug levels  Outcome: Progressing     Problem: RISK FOR INFECTION (RISK FACTORS FOR MATERNAL CHORIOAMNIOITIS - )  Goal: No evidence of infection  Description  INTERVENTIONS:  - Instruct family/visitors to use good hand hygiene technique  - Monitor for symptoms of infection  - Monitor culture and CBC results  - Administer antibiotics as ordered    Monitor drug levels  Outcome: Progressing     Problem: SAFETY -   Goal: Patient will remain free from falls  Description  INTERVENTIONS:  - Instruct family/caregiver on patient safety  - Keep incubator doors and portholes closed when unattended  - Keep radiant warmer side rails and crib rails up when unattended  - Based on caregiver fall risk screen, instruct family/caregiver to ask for assistance with transferring infant if caregiver noted to have fall risk factors  Outcome: Progressing     Problem: Knowledge Deficit  Goal: Patient/family/caregiver demonstrates understanding of disease process, treatment plan, medications, and discharge instructions  Description  Complete learning assessment and assess knowledge base    Interventions:  - Provide teaching at level of understanding  - Provide teaching via preferred learning methods  Outcome: Progressing  Goal: Infant caregiver verbalizes understanding of benefits of skin-to-skin with healthy   Description  Prior to delivery, educate patient regarding skin-to-skin practice and its benefits  Initiate immediate and uninterrupted skin-to-skin contact after birth until breastfeeding is initiated or a minimum of one hour  Encourage continued skin-to-skin contact throughout the post partum stay    Outcome: Progressing  Goal: Infant caregiver verbalizes understanding of benefits and management of breastfeeding their healthy   Description  Help initiate breastfeeding within one hour of birth  Educate/assist with breastfeeding positioning and latch  Educate on safe positioning and to monitor their  for safety  Educate on how to maintain lactation even if they are  from their   Educate/initiate pumping for a mom with a baby in the NICU within 6 hours after birth  Give infants no food or drink other than breast milk unless medically indicated  Educate on feeding cues and encourage breastfeeding on demand    Outcome: Progressing  Goal: Infant caregiver verbalizes understanding of benefits to rooming-in with their healthy   Description  Promote rooming in 23 out of 24 hours per day  Educate on benefits to rooming-in  Provide  care in room with parents as long as infant and mother condition allow    Outcome: Progressing  Goal: Provide formula feeding instructions and preparation information to caregivers who do not wish to breastfeed their   Description  Provide one on one information on frequency, amount, and burping for formula feeding caregivers throughout their stay and at discharge  Provide written information/video on formula preparation  Outcome: Progressing  Goal: Infant caregiver verbalizes understanding of support and resources for follow up after discharge  Description  Provide individual discharge education on when to call the doctor  Provide resources and contact information for post-discharge support      Outcome: Progressing     Problem: DISCHARGE PLANNING  Goal: Discharge to home or other facility with appropriate resources  Description  INTERVENTIONS:  - Identify barriers to discharge w/patient and caregiver  - Arrange for needed discharge resources and transportation as appropriate  - Identify discharge learning needs (meds, wound care, etc )  - Arrange for interpretive services to assist at discharge as needed  - Refer to Case Management Department for coordinating discharge planning if the patient needs post-hospital services based on physician/advanced practitioner order or complex needs related to functional status, cognitive ability, or social support system  Outcome: Progressing     Problem: ALTERATION IN THE BREAST  Goal: Optimize infant feeding at the breast  Description  INTERVENTIONS:  - Latch, breast and nipple assessment  - Assess prior breast feeding history  - Hand expression of breast milk  - For cracked, bleeding and or sore nipples reassess latch, treat damaged nipple  -Educate mother on feeding cues  -Positioning/latch techniques  Outcome: Progressing     Problem: INADEQUATE LATCH, SUCK OR SWALLOW  Goal: Demonstrate ability to latch and sustain latch, audible swallowing and satiety  Description  INTERVENTIONS:  - Assess oral anatomy, notify Physician/AP for abnormal findings  - Establish milk expression  - Maximize feeding opportunity (skin to skin, behavioral state)  - Position/latch techniques  - Discourage use of pacifier-artificial nipple  - Mechanical pumping  - Nipple Shield  - Supplemental formula feeding (Physician/AP order)  - Alternative feeding method  Outcome: Progressing
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
## 2020-01-26 NOTE — PROGRESS NOTES
Progress Note - OB/GYN   Gonzalo Reap 20 y o  female MRN: [id number]  Unit/Bed#: -01 Encounter: [id number]    Assessment:  Pod 2 s/p c/s    Plan:  Continue present care    Subjective/Objective   Chief Complaint: none    Subjective: feels well states 'thousand times better than yesterday '    Objective: vss    Vitals: Blood pressure 121/75, pulse 90, temperature 98 °F (36 7 °C), temperature source Oral, resp  rate 16, height 4' 8" (1 422 m), weight 65 8 kg (145 lb), last menstrual period 04/26/2019, SpO2 98 %, currently breastfeeding  ,Body mass index is 32 51 kg/m²  Intake/Output Summary (Last 24 hours) at 1/26/2020 7347  Last data filed at 1/25/2020 1156  Gross per 24 hour   Intake    Output 400 ml   Net -400 ml       Invasive Devices     Peripheral Intravenous Line            Peripheral IV 01/24/20 Left Wrist 2 days                Physical Exam: pt breastfeeding  abd soft    Lab, Imaging and other studies: I have personally reviewed pertinent reports  Assessment/Plan   Assessment & Plan    Subjective   Subjective  Temp:  [98 °F (36 7 °C)-98 8 °F (37 1 °C)] 98 °F (36 7 °C)  HR:  [] 90  Resp:  [16-20] 16  BP: ()/(52-80) 121/75  I/O last 3 completed shifts:  In: -   Out: 2450 [Urine:2450]  No intake/output data recorded      Objective   Objective  Principal Problem:    39 weeks gestation of pregnancy  Active Problems:    Seizure ([address] )    Anemia during pregnancy in third trimester

## 2020-01-27 PROCEDURE — 99222 1ST HOSP IP/OBS MODERATE 55: CPT | Performed by: PSYCHIATRY & NEUROLOGY

## 2020-01-27 RX ORDER — GUAIFENESIN 600 MG
600 TABLET, EXTENDED RELEASE 12 HR ORAL EVERY 12 HOURS SCHEDULED
Status: DISCONTINUED | OUTPATIENT
Start: 2020-01-27 | End: 2020-01-28 | Stop reason: HOSPADM

## 2020-01-27 RX ORDER — MEDROXYPROGESTERONE ACETATE 150 MG/ML
150 INJECTION, SUSPENSION INTRAMUSCULAR ONCE
Status: DISCONTINUED | OUTPATIENT
Start: 2020-01-27 | End: 2020-01-28 | Stop reason: HOSPADM

## 2020-01-27 RX ADMIN — DOCUSATE SODIUM 100 MG: 100 CAPSULE, LIQUID FILLED ORAL at 08:27

## 2020-01-27 RX ADMIN — GUAIFENESIN 600 MG: 600 TABLET, EXTENDED RELEASE ORAL at 10:23

## 2020-01-27 RX ADMIN — GUAIFENESIN 600 MG: 600 TABLET, EXTENDED RELEASE ORAL at 20:58

## 2020-01-27 RX ADMIN — LEVETIRACETAM 1000 MG: 500 TABLET, FILM COATED ORAL at 20:58

## 2020-01-27 RX ADMIN — OXYCODONE HYDROCHLORIDE AND ACETAMINOPHEN 1 TABLET: 5; 325 TABLET ORAL at 08:27

## 2020-01-27 RX ADMIN — OXYCODONE HYDROCHLORIDE AND ACETAMINOPHEN 2 TABLET: 5; 325 TABLET ORAL at 14:48

## 2020-01-27 RX ADMIN — OXYCODONE HYDROCHLORIDE AND ACETAMINOPHEN 1 TABLET: 5; 325 TABLET ORAL at 02:35

## 2020-01-27 RX ADMIN — DOCUSATE SODIUM 100 MG: 100 CAPSULE, LIQUID FILLED ORAL at 20:17

## 2020-01-27 RX ADMIN — OXYCODONE HYDROCHLORIDE AND ACETAMINOPHEN 2 TABLET: 5; 325 TABLET ORAL at 20:14

## 2020-01-27 RX ADMIN — LEVETIRACETAM 1000 MG: 500 TABLET, FILM COATED ORAL at 10:23

## 2020-01-27 NOTE — PROGRESS NOTES
Pt called out stating, "I'm about to have a seizure " RN at beside, VS WNL except pulse 130-140s  Pulse ox 99% Pt unable to speak to nursing about what she is feeling, just yells, "I want the fucking doctor " MD notified and en route  Pt is crying and asking for her mother who is not present  With the baby is pt's younger sister  RN at bedside
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
## 2020-01-27 NOTE — PROGRESS NOTES
Progress Note - OB/GYN   Romina Garces 20 y o  female MRN: [id number]  Unit/Bed#:  305-01 Encounter: [id number]    Assessment:  POD#3 s/p Primary low transverse  section, stable    Plan:  1  Postop  - hemoglobin 10 3->9 1  - past voiding trial    2  Seizure throughout pregnancy-unwitnessed  - Keppra 1000 BID  - Psychiatry consult      3  Socially complex pregnancy course  - case management consult    4  Contraception  -Depo-Provera ordered per patient desires    5  Disposition  - anticipate discharge today    Subjective/Objective   Chief Complaint:     POD#3 s/p Primary low transverse  section    Subjective:     Pain: incisional pain   Tolerating PO: yes  Voiding: yes  Flatus: yes  BM: no  Ambulating: yes  Breastfeeding: Breastfeeding  Chest pain: no  Shortness of breath: no  Leg pain: no  Lochia: minimal    Objective:     Vitals:  Vitals:    20 0810 20 1643 20 2215 20 0030   BP: 97/56 100/59 140/80 114/74   BP Location:       Pulse: 98 (!) 109 (!) 135 101   Resp: 18 18 (!) 24 16   Temp: 97 9 °F (36 6 °C) 98 3 °F (36 8 °C)  98 9 °F (37 2 °C)   TempSrc: Oral Oral  Oral   SpO2: 98% 98% 99% 100%   Weight:       Height:           Physical Exam:     Physical Exam   Constitutional: She is oriented to person, place, and time  She appears well-developed and well-nourished  No distress  Cardiovascular: Normal rate and regular rhythm  Pulmonary/Chest: Effort normal    Abdominal: Soft  Musculoskeletal: Normal range of motion  Neurological: She is alert and oriented to person, place, and time  Skin: Skin is warm  She is not diaphoretic  Psychiatric: She has a normal mood and affect  Her behavior is normal      Uterine fundus firm and non-tender, -3 cm below the umbilicus       Lab, Imaging and other studies: I have personally reviewed pertinent reports        Lab Results   Component Value Date    WBC 11 27 (H) 2020    HGB 9 1 (L) 2020    HCT 28 6 (L) 2020 MCV 85 01/25/2020     01/25/2020               Hannah Balderas MD  80/66/71
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
## 2020-01-27 NOTE — PROGRESS NOTES
ctsp re: feeling anxious/seizure    Pt very anxious puse up appears agitated asking for her mother   sister in room states this is the way she gets when anxious     Was on meds during pregnancy then stopped on her own  Cursing at staff but refuses to answer questions

## 2020-01-27 NOTE — PROGRESS NOTES
RN remains at bedside, pt still agitated and anxious  Has not exhibited any seizure activity  Pt repeatedly states, "please don't take my baby " Reassurance given  Will continue to monitor

## 2020-01-28 VITALS
BODY MASS INDEX: 32.62 KG/M2 | HEART RATE: 84 BPM | HEIGHT: 56 IN | SYSTOLIC BLOOD PRESSURE: 111 MMHG | DIASTOLIC BLOOD PRESSURE: 75 MMHG | WEIGHT: 145 LBS | RESPIRATION RATE: 20 BRPM | TEMPERATURE: 98.1 F | OXYGEN SATURATION: 97 %

## 2020-01-28 RX ORDER — HYDROXYZINE HYDROCHLORIDE 10 MG/1
10 TABLET, FILM COATED ORAL 2 TIMES DAILY
Status: ON HOLD | COMMUNITY
Start: 2020-01-28 | End: 2020-01-28 | Stop reason: SDUPTHER

## 2020-01-28 RX ORDER — LEVETIRACETAM 500 MG/1
1000 TABLET ORAL EVERY 12 HOURS SCHEDULED
Status: DISCONTINUED | OUTPATIENT
Start: 2020-01-28 | End: 2020-01-28 | Stop reason: SDUPTHER

## 2020-01-28 RX ORDER — HYDROXYZINE HYDROCHLORIDE 10 MG/1
10 TABLET, FILM COATED ORAL 2 TIMES DAILY
Qty: 60 TABLET | Refills: 0 | Status: SHIPPED | OUTPATIENT
Start: 2020-01-28 | End: 2020-12-10

## 2020-01-28 RX ORDER — HYDROXYZINE HYDROCHLORIDE 10 MG/1
10 TABLET, FILM COATED ORAL 2 TIMES DAILY
Qty: 60 TABLET | Refills: 0 | Status: SHIPPED | OUTPATIENT
Start: 2020-01-28 | End: 2020-01-28 | Stop reason: SDUPTHER

## 2020-01-28 RX ORDER — OXYCODONE HYDROCHLORIDE AND ACETAMINOPHEN 5; 325 MG/1; MG/1
1 TABLET ORAL EVERY 4 HOURS PRN
Qty: 6 TABLET | Refills: 0 | Status: SHIPPED | OUTPATIENT
Start: 2020-01-28 | End: 2020-02-07

## 2020-01-28 RX ORDER — LEVETIRACETAM 1000 MG/1
1000 TABLET ORAL EVERY 12 HOURS SCHEDULED
Qty: 60 TABLET | Refills: 0 | Status: SHIPPED | OUTPATIENT
Start: 2020-01-28 | End: 2020-10-20 | Stop reason: HOSPADM

## 2020-01-28 RX ORDER — OXYCODONE HYDROCHLORIDE AND ACETAMINOPHEN 5; 325 MG/1; MG/1
1 TABLET ORAL EVERY 4 HOURS PRN
Qty: 6 TABLET | Refills: 0 | Status: SHIPPED | OUTPATIENT
Start: 2020-01-28 | End: 2020-01-28

## 2020-01-28 RX ADMIN — LEVETIRACETAM 1000 MG: 500 TABLET, FILM COATED ORAL at 08:12

## 2020-01-28 RX ADMIN — GUAIFENESIN 600 MG: 600 TABLET, EXTENDED RELEASE ORAL at 08:12

## 2020-01-28 RX ADMIN — OXYCODONE HYDROCHLORIDE AND ACETAMINOPHEN 2 TABLET: 5; 325 TABLET ORAL at 11:24

## 2020-01-28 RX ADMIN — DOCUSATE SODIUM 100 MG: 100 CAPSULE, LIQUID FILLED ORAL at 08:12

## 2020-01-28 RX ADMIN — OXYCODONE HYDROCHLORIDE AND ACETAMINOPHEN 2 TABLET: 5; 325 TABLET ORAL at 06:17

## 2020-01-28 NOTE — PLAN OF CARE
Problem: Potential for Falls  Goal: Patient will remain free of falls  Description  INTERVENTIONS:  - Assess patient frequently for physical needs  -  Identify cognitive and physical deficits and behaviors that affect risk of falls  -  [place] fall precautions as indicated by assessment   - Educate patient/family on patient safety including physical limitations  - Instruct patient to call for assistance with activity based on assessment  - Modify environment to reduce risk of injury  - Consider OT/PT consult to assist with strengthening/mobility  Outcome: Completed     Problem: POSTPARTUM  Goal: Experiences normal postpartum course  Description  INTERVENTIONS:  - Monitor maternal vital signs  - Assess uterine involution and lochia  Outcome: Completed  Goal: Appropriate maternal -  bonding  Description  INTERVENTIONS:  - Identify family support  - Assess for appropriate maternal/infant bonding   -Encourage maternal/infant bonding opportunities  - Referral to  or  as needed  Outcome: Completed  Goal: Establishment of infant feeding pattern  Description  INTERVENTIONS:  - Assess breast/bottle feeding  - Refer to lactation as needed  Outcome: Completed  Goal: Incision(s), wounds(s) or drain site(s) healing without S/S of infection  Description  INTERVENTIONS  - Assess and document risk factors for skin impairment   - Assess and document dressing, incision, wound bed, drain sites and surrounding tissue  - Consider nutrition services referral as needed  - Oral mucous membranes remain intact  - Provide patient/ family education  Outcome: Completed     Problem: PAIN -   Goal: Displays adequate comfort level or baseline comfort level  Description  INTERVENTIONS:  - Perform pain scoring using age-appropriate tool with hands-on care as needed    Notify physician/AP of high pain scores not responsive to comfort measures  - Administer analgesics based on type and severity of pain and evaluate response  - Sucrose analgesia per protocol for brief minor painful procedures  - Teach parents interventions for comforting infant  Outcome: Completed     Problem: THERMOREGULATION - /PEDIATRICS  Goal: Maintains normal body temperature  Description  Interventions:  - Monitor temperature (axillary for Newborns) as ordered  - Monitor for signs of hypothermia or hyperthermia  - Provide thermal support measures  - Wean to open crib when appropriate  Outcome: Completed     Problem: INFECTION -   Goal: No evidence of infection  Description  INTERVENTIONS:  - Instruct family/visitors to use good hand hygiene technique  - Identify and instruct in appropriate isolation precautions for identified infection/condition  - Change incubator every 2 weeks or as needed  - Monitor for symptoms of infection  - Monitor surgical sites and insertion sites for all indwelling lines, tubes, and drains for drainage, redness, or edema   - Monitor endotracheal and nasal secretions for changes in amount and color  - Monitor culture and CBC results  - Administer antibiotics as ordered  Monitor drug levels  Outcome: Completed     Problem: RISK FOR INFECTION (RISK FACTORS FOR MATERNAL CHORIOAMNIOITIS - )  Goal: No evidence of infection  Description  INTERVENTIONS:  - Instruct family/visitors to use good hand hygiene technique  - Monitor for symptoms of infection  - Monitor culture and CBC results  - Administer antibiotics as ordered    Monitor drug levels  Outcome: Completed     Problem: SAFETY -   Goal: Patient will remain free from falls  Description  INTERVENTIONS:  - Instruct family/caregiver on patient safety  - Keep incubator doors and portholes closed when unattended  - Keep radiant warmer side rails and crib rails up when unattended  - Based on caregiver fall risk screen, instruct family/caregiver to ask for assistance with transferring infant if caregiver noted to have fall risk factors  Outcome: Completed     Problem: Knowledge Deficit  Goal: Patient/family/caregiver demonstrates understanding of disease process, treatment plan, medications, and discharge instructions  Description  Complete learning assessment and assess knowledge base    Interventions:  - Provide teaching at level of understanding  - Provide teaching via preferred learning methods  Outcome: Completed  Goal: Infant caregiver verbalizes understanding of benefits of skin-to-skin with healthy   Description  Prior to delivery, educate patient regarding skin-to-skin practice and its benefits  Initiate immediate and uninterrupted skin-to-skin contact after birth until breastfeeding is initiated or a minimum of one hour  Encourage continued skin-to-skin contact throughout the post partum stay    Outcome: Completed  Goal: Infant caregiver verbalizes understanding of benefits and management of breastfeeding their healthy   Description  Help initiate breastfeeding within one hour of birth  Educate/assist with breastfeeding positioning and latch  Educate on safe positioning and to monitor their  for safety  Educate on how to maintain lactation even if they are  from their   Educate/initiate pumping for a mom with a baby in the NICU within 6 hours after birth  Give infants no food or drink other than breast milk unless medically indicated  Educate on feeding cues and encourage breastfeeding on demand    Outcome: Completed  Goal: Infant caregiver verbalizes understanding of benefits to rooming-in with their healthy   Description  Promote rooming in 23 out of 24 hours per day  Educate on benefits to rooming-in  Provide  care in room with parents as long as infant and mother condition allow    Outcome: Completed  Goal: Provide formula feeding instructions and preparation information to caregivers who do not wish to breastfeed their   Description  Provide one on one information on frequency, amount, and burping for formula feeding caregivers throughout their stay and at discharge  Provide written information/video on formula preparation  Outcome: Completed  Goal: Infant caregiver verbalizes understanding of support and resources for follow up after discharge  Description  Provide individual discharge education on when to call the doctor  Provide resources and contact information for post-discharge support      Outcome: Completed     Problem: DISCHARGE PLANNING  Goal: Discharge to home or other facility with appropriate resources  Description  INTERVENTIONS:  - Identify barriers to discharge w/patient and caregiver  - Arrange for needed discharge resources and transportation as appropriate  - Identify discharge learning needs (meds, wound care, etc )  - Arrange for interpretive services to assist at discharge as needed  - Refer to Case Management Department for coordinating discharge planning if the patient needs post-hospital services based on physician/advanced practitioner order or complex needs related to functional status, cognitive ability, or social support system  Outcome: Completed     Problem: ALTERATION IN THE BREAST  Goal: Optimize infant feeding at the breast  Description  INTERVENTIONS:  - Latch, breast and nipple assessment  - Assess prior breast feeding history  - Hand expression of breast milk  - For cracked, bleeding and or sore nipples reassess latch, treat damaged nipple  -Educate mother on feeding cues  -Positioning/latch techniques  Outcome: Completed     Problem: INADEQUATE LATCH, SUCK OR SWALLOW  Goal: Demonstrate ability to latch and sustain latch, audible swallowing and satiety  Description  INTERVENTIONS:  - Assess oral anatomy, notify Physician/AP for abnormal findings  - Establish milk expression  - Maximize feeding opportunity (skin to skin, behavioral state)  - Position/latch techniques  - Discourage use of pacifier-artificial nipple  - Mechanical pumping  - Nipple Shield  - Supplemental formula feeding (Physician/AP order)  - Alternative feeding method  Outcome: Completed

## 2020-01-28 NOTE — LACTATION NOTE
This note was copied from a baby's chart  C/O sore nipples  Information given about sore nipples and how to correct with positioning techniques  Discussed maneuvers to latch infant on properly to avoid nipple pain and promote healing  Discussed treatments that could be utilized to promote healing  Hydro gel dressings given with instruction and verbalization of understanding of cleaning and duration of use  Information on hand expression given  Discussed benefits of knowing how to manually express breast including stimulating milk supply, softening nipple for latch and evacuating breast in the event of engorgement  Verbal reviewed positioning infant up at chest level and starting to feed infant with nose arriving at the nipple  Then, using areolar compression to achieve a deep latch that is comfortable and exchanges optimum amounts of milk  Mom stated she did what I told her and it worked, baby ate 5-10 minutes while I was getting breast supplies for nipple trauma  Discussed risks for early supplementation: over feeding, longer digestion times, engorgement for mom, lower milk supply for mom, and nipple confusion  Benefits of breast feeding for infant's intestinal tract, less engorgement for mom, protection from multiple disease processes as infant develops, avoidance of over feeding for infant, less nipple confusion, and increased health benefits for mom  Met with mother to go over feeding log since birth for the first week  Emphasized 8 or more (12) feedings in a 24 hour period, what to expect for the number of diapers per day of life and the progression of properties of the  stooling pattern  Discussed s/s that breastfeeding is going well after day 4 and when to get help from a pediatrician or lactation support person after day 4      Booklet included Breast Pumping Instructions, When You Go Back to Work or School, and Breastfeeding Resources for after discharge including access to the number for the [address]  Family at bedside  [employer]  to call for assistance, questions and concerns  Extension number for inpatient lactation support provided

## 2020-01-28 NOTE — PLAN OF CARE
Problem: Potential for Falls  Goal: Patient will remain free of falls  Description  INTERVENTIONS:  - Assess patient frequently for physical needs  -  Identify cognitive and physical deficits and behaviors that affect risk of falls  -  [place] fall precautions as indicated by assessment   - Educate patient/family on patient safety including physical limitations  - Instruct patient to call for assistance with activity based on assessment  - Modify environment to reduce risk of injury  - Consider OT/PT consult to assist with strengthening/mobility  Outcome: Progressing     Problem: POSTPARTUM  Goal: Experiences normal postpartum course  Description  INTERVENTIONS:  - Monitor maternal vital signs  - Assess uterine involution and lochia  Outcome: Progressing  Goal: Appropriate maternal -  bonding  Description  INTERVENTIONS:  - Identify family support  - Assess for appropriate maternal/infant bonding   -Encourage maternal/infant bonding opportunities  - Referral to  or  as needed  Outcome: Progressing  Goal: Establishment of infant feeding pattern  Description  INTERVENTIONS:  - Assess breast/bottle feeding  - Refer to lactation as needed  Outcome: Progressing  Goal: Incision(s), wounds(s) or drain site(s) healing without S/S of infection  Description  INTERVENTIONS  - Assess and document risk factors for skin impairment   - Assess and document dressing, incision, wound bed, drain sites and surrounding tissue  - Consider nutrition services referral as needed  - Oral mucous membranes remain intact  - Provide patient/ family education  Outcome: Progressing     Problem: PAIN -   Goal: Displays adequate comfort level or baseline comfort level  Description  INTERVENTIONS:  - Perform pain scoring using age-appropriate tool with hands-on care as needed    Notify physician/AP of high pain scores not responsive to comfort measures  - Administer analgesics based on type and severity of pain and evaluate response  - Sucrose analgesia per protocol for brief minor painful procedures  - Teach parents interventions for comforting infant  Outcome: Progressing     Problem: THERMOREGULATION - /PEDIATRICS  Goal: Maintains normal body temperature  Description  Interventions:  - Monitor temperature (axillary for Newborns) as ordered  - Monitor for signs of hypothermia or hyperthermia  - Provide thermal support measures  - Wean to open crib when appropriate  Outcome: Progressing     Problem: INFECTION -   Goal: No evidence of infection  Description  INTERVENTIONS:  - Instruct family/visitors to use good hand hygiene technique  - Identify and instruct in appropriate isolation precautions for identified infection/condition  - Change incubator every 2 weeks or as needed  - Monitor for symptoms of infection  - Monitor surgical sites and insertion sites for all indwelling lines, tubes, and drains for drainage, redness, or edema   - Monitor endotracheal and nasal secretions for changes in amount and color  - Monitor culture and CBC results  - Administer antibiotics as ordered  Monitor drug levels  Outcome: Progressing     Problem: RISK FOR INFECTION (RISK FACTORS FOR MATERNAL CHORIOAMNIOITIS - )  Goal: No evidence of infection  Description  INTERVENTIONS:  - Instruct family/visitors to use good hand hygiene technique  - Monitor for symptoms of infection  - Monitor culture and CBC results  - Administer antibiotics as ordered    Monitor drug levels  Outcome: Progressing     Problem: SAFETY -   Goal: Patient will remain free from falls  Description  INTERVENTIONS:  - Instruct family/caregiver on patient safety  - Keep incubator doors and portholes closed when unattended  - Keep radiant warmer side rails and crib rails up when unattended  - Based on caregiver fall risk screen, instruct family/caregiver to ask for assistance with transferring infant if caregiver noted to have fall risk factors  Outcome: Progressing     Problem: Knowledge Deficit  Goal: Patient/family/caregiver demonstrates understanding of disease process, treatment plan, medications, and discharge instructions  Description  Complete learning assessment and assess knowledge base    Interventions:  - Provide teaching at level of understanding  - Provide teaching via preferred learning methods  Outcome: Progressing  Goal: Infant caregiver verbalizes understanding of benefits of skin-to-skin with healthy   Description  Prior to delivery, educate patient regarding skin-to-skin practice and its benefits  Initiate immediate and uninterrupted skin-to-skin contact after birth until breastfeeding is initiated or a minimum of one hour  Encourage continued skin-to-skin contact throughout the post partum stay    Outcome: Progressing  Goal: Infant caregiver verbalizes understanding of benefits and management of breastfeeding their healthy   Description  Help initiate breastfeeding within one hour of birth  Educate/assist with breastfeeding positioning and latch  Educate on safe positioning and to monitor their  for safety  Educate on how to maintain lactation even if they are  from their   Educate/initiate pumping for a mom with a baby in the NICU within 6 hours after birth  Give infants no food or drink other than breast milk unless medically indicated  Educate on feeding cues and encourage breastfeeding on demand    Outcome: Progressing  Goal: Infant caregiver verbalizes understanding of benefits to rooming-in with their healthy   Description  Promote rooming in 23 out of 24 hours per day  Educate on benefits to rooming-in  Provide  care in room with parents as long as infant and mother condition allow    Outcome: Progressing  Goal: Provide formula feeding instructions and preparation information to caregivers who do not wish to breastfeed their   Description  Provide one on one information on frequency, amount, and burping for formula feeding caregivers throughout their stay and at discharge  Provide written information/video on formula preparation  Outcome: Progressing  Goal: Infant caregiver verbalizes understanding of support and resources for follow up after discharge  Description  Provide individual discharge education on when to call the doctor  Provide resources and contact information for post-discharge support      Outcome: Progressing     Problem: DISCHARGE PLANNING  Goal: Discharge to home or other facility with appropriate resources  Description  INTERVENTIONS:  - Identify barriers to discharge w/patient and caregiver  - Arrange for needed discharge resources and transportation as appropriate  - Identify discharge learning needs (meds, wound care, etc )  - Arrange for interpretive services to assist at discharge as needed  - Refer to Case Management Department for coordinating discharge planning if the patient needs post-hospital services based on physician/advanced practitioner order or complex needs related to functional status, cognitive ability, or social support system  Outcome: Progressing     Problem: ALTERATION IN THE BREAST  Goal: Optimize infant feeding at the breast  Description  INTERVENTIONS:  - Latch, breast and nipple assessment  - Assess prior breast feeding history  - Hand expression of breast milk  - For cracked, bleeding and or sore nipples reassess latch, treat damaged nipple  -Educate mother on feeding cues  -Positioning/latch techniques  Outcome: Progressing     Problem: INADEQUATE LATCH, SUCK OR SWALLOW  Goal: Demonstrate ability to latch and sustain latch, audible swallowing and satiety  Description  INTERVENTIONS:  - Assess oral anatomy, notify Physician/AP for abnormal findings  - Establish milk expression  - Maximize feeding opportunity (skin to skin, behavioral state)  - Position/latch techniques  - Discourage use of pacifier-artificial nipple  - Mechanical pumping  - Nipple Shield  - Supplemental formula feeding (Physician/AP order)  - Alternative feeding method  Outcome: Progressing

## 2020-01-28 NOTE — UTILIZATION REVIEW
Notification of Maternity/Delivery & [place] Birth Information for Admission   Notification of Maternity/Delivery for Admission to our facility [facility]  Be advised that this patient was admitted to our facility under Inpatient Status  Contact Tracy Montes at [phone] for additional admission information  [address] [place] [address] PARENT/CHILD HEALTH  DEPT DEDICATED Diane Gonzalez [phone]  Mother &  Information   Patient Name: Bertha Morin   YOB: 2000   Delivering clinician:     OB History        1    Para   1    Term   1       0    AB   0    Living   1       SAB   0    TAB   0    Ectopic   0    Multiple   0    Live Births   1                Name & MRN:   Information for the patient's :  Lis Martini Girl Opal Jiménez) [[id number]]      Delivery Information:  Sex: female  Delivered 2020 10:49 AM by , Low Transverse; Gestational Age: 36w0d    [place] Measurements:  Weight: 7 lb 2 2 oz (3238 g); Height: 19"    APGAR 1 minute 5 minutes 10 minutes   Totals:        [place] Birth Information: 20 y o  female MRN: [id number] Unit/Bed#: -01 Estimated Date of Delivery: 20  Birthweight: No birth weight on file  Gestational Age: <None> Delivery Type: , Low Transverse          APGARS  One minute Five minutes Ten minutes   Totals:                 [address]   [address]:   [address]  Tax ID: [id number]  NPI: [id number] Attending Provider/NPI: Jerilyn Perry [[id number]]   Place of Service Code: 24     Place of Service Name:  Inpatient Hospital   Start Date: 20 IPADMITTIME@     Discharge Date & Time: 2020  1:54 PM    Type of Admission: Inpatient Status Discharge Disposition (if discharged): Home/Self Care   Patient Diagnoses:   Elective surgical procedure [Z41 9]  The primary encounter diagnosis was Elective surgical procedure  Diagnoses of Seizure ([address] ), Anxiety, and Anxiety in pregnancy in third trimester, antepartum were also pertinent to this visit  1  Elective surgical procedure    2  Seizure ([address] )    3  Anxiety    4  Anxiety in pregnancy in third trimester, antepartum       Orders: Admission Orders (From admission, onward)     Ordered        01/24/20 0749  Inpatient Admission  Once                    Assigned Utilization Review Contact: Gagandeep Wade  Utilization   Network Utilization Review Department  Phone: [phone]; Fax [phone]  Email: [email or web address]     Notification of Discharge  This is a Notification of Discharge from our facility [address]  Please be advised that this patient has been discharge from our facility  Below you will find the admission and discharge date and time including the patients disposition  PRESENTATION DATE: 1/24/2020  7:13 AM    IP ADMISSION DATE: 1/24/20 0713   DISCHARGE DATE: 1/28/2020  1:54 PM  DISPOSITION: Home/Self Care Home/Self Care   Admission Orders listed below:  Admission Orders (From admission, onward)     Ordered        01/24/20 0749  Inpatient Admission  Once                   Please contact the UR Department if additional information is required to close this patient's authorization/case  Gagandeep aWde  Network Utilization Review Department  Main: [phone] x carefully listen to the prompts  All voicemails are confidential   [email or web address]  Send all requests for admission clinical reviews, approved or denied determinations and any other requests to dedicated fax number below belonging to the campus where the patient is receiving treatment   List of dedicated fax numbers:  [address] DENIALS (Administrative/Medical Necessity) [phone]   [address] (Maternity/NICU/Pediatrics) [phone]   Jason Aguero [phone]   Myra Nunez Hilario Mota [phone]   Nae Lux [phone]   [facility] [address] [facility] [phone]   [facility]  [phone]   [address] [facility]  [phone]   [address] [place] [address] 1000 [facility] [phone]

## 2020-01-28 NOTE — PLAN OF CARE
Problem: Potential for Falls  Goal: Patient will remain free of falls  Description  INTERVENTIONS:  - Assess patient frequently for physical needs  -  Identify cognitive and physical deficits and behaviors that affect risk of falls    -  [place] fall precautions as indicated by assessment   - Educate patient/family on patient safety including physical limitations  - Instruct patient to call for assistance with activity based on assessment  - Modify environment to reduce risk of injury  - Consider OT/PT consult to assist with strengthening/mobility  2020 by Layne Flaherty RN  Outcome: Progressing  2020 by Layne Flaherty RN  Outcome: Progressing     Problem: POSTPARTUM  Goal: Experiences normal postpartum course  Description  INTERVENTIONS:  - Monitor maternal vital signs  - Assess uterine involution and lochia  2020 by Layne Flaherty RN  Outcome: Progressing  2020 by Layne Flaherty RN  Outcome: Progressing  Goal: Appropriate maternal -  bonding  Description  INTERVENTIONS:  - Identify family support  - Assess for appropriate maternal/infant bonding   -Encourage maternal/infant bonding opportunities  - Referral to  or  as needed  2020 by Layne Flaherty RN  Outcome: Progressing  2020 by Layne Flaherty RN  Outcome: Progressing  Goal: Establishment of infant feeding pattern  Description  INTERVENTIONS:  - Assess breast/bottle feeding  - Refer to lactation as needed  2020 by Layne Flaherty RN  Outcome: Progressing  2020 by Layne Flaherty RN  Outcome: Progressing  Goal: Incision(s), wounds(s) or drain site(s) healing without S/S of infection  Description  INTERVENTIONS  - Assess and document risk factors for skin impairment   - Assess and document dressing, incision, wound bed, drain sites and surrounding tissue  - Consider nutrition services referral as needed  - Oral mucous membranes remain intact  - Provide patient/ family education  2020 by Lacey Hester RN  Outcome: Progressing  2020 by Lacey Hester RN  Outcome: Progressing     Problem: PAIN -   Goal: Displays adequate comfort level or baseline comfort level  Description  INTERVENTIONS:  - Perform pain scoring using age-appropriate tool with hands-on care as needed  Notify physician/AP of high pain scores not responsive to comfort measures  - Administer analgesics based on type and severity of pain and evaluate response  - Sucrose analgesia per protocol for brief minor painful procedures  - Teach parents interventions for comforting infant  2020 by Lacey Hester RN  Outcome: Progressing  2020 by Lacey Hester RN  Outcome: Progressing     Problem: THERMOREGULATION - /PEDIATRICS  Goal: Maintains normal body temperature  Description  Interventions:  - Monitor temperature (axillary for Newborns) as ordered  - Monitor for signs of hypothermia or hyperthermia  - Provide thermal support measures  - Wean to open crib when appropriate  2020 by Lacey Hester RN  Outcome: Progressing  2020 by Lacey Hester RN  Outcome: Progressing     Problem: INFECTION -   Goal: No evidence of infection  Description  INTERVENTIONS:  - Instruct family/visitors to use good hand hygiene technique  - Identify and instruct in appropriate isolation precautions for identified infection/condition  - Change incubator every 2 weeks or as needed  - Monitor for symptoms of infection  - Monitor surgical sites and insertion sites for all indwelling lines, tubes, and drains for drainage, redness, or edema   - Monitor endotracheal and nasal secretions for changes in amount and color  - Monitor culture and CBC results  - Administer antibiotics as ordered    Monitor drug levels  2020 by Lacey Hester RN  Outcome: Progressing  2020 by Lacey Hester RN  Outcome: Progressing     Problem: RISK FOR INFECTION (RISK FACTORS FOR MATERNAL CHORIOAMNIOITIS - )  Goal: No evidence of infection  Description  INTERVENTIONS:  - Instruct family/visitors to use good hand hygiene technique  - Monitor for symptoms of infection  - Monitor culture and CBC results  - Administer antibiotics as ordered  Monitor drug levels  2020 by Sandeep Thompson RN  Outcome: Progressing  2020 by Sandeep Thompson RN  Outcome: Progressing     Problem: SAFETY -   Goal: Patient will remain free from falls  Description  INTERVENTIONS:  - Instruct family/caregiver on patient safety  - Keep incubator doors and portholes closed when unattended  - Keep radiant warmer side rails and crib rails up when unattended  - Based on caregiver fall risk screen, instruct family/caregiver to ask for assistance with transferring infant if caregiver noted to have fall risk factors  2020 by Sandeep Thompson RN  Outcome: Progressing  2020 by Sandeep Thompson RN  Outcome: Progressing     Problem: Knowledge Deficit  Goal: Patient/family/caregiver demonstrates understanding of disease process, treatment plan, medications, and discharge instructions  Description  Complete learning assessment and assess knowledge base    Interventions:  - Provide teaching at level of understanding  - Provide teaching via preferred learning methods  2020 by Sandeep Thompson RN  Outcome: Progressing  2020 by Sandeep Thompson RN  Outcome: Progressing  Goal: Infant caregiver verbalizes understanding of benefits of skin-to-skin with healthy   Description  Prior to delivery, educate patient regarding skin-to-skin practice and its benefits  Initiate immediate and uninterrupted skin-to-skin contact after birth until breastfeeding is initiated or a minimum of one hour  Encourage continued skin-to-skin contact throughout the post partum stay    2020 by Rayshawn Zuluaga Sahara Vernon RN  Outcome: Progressing  2020 by Osborn Boast, RN  Outcome: Progressing  Goal: Infant caregiver verbalizes understanding of benefits and management of breastfeeding their healthy   Description  Help initiate breastfeeding within one hour of birth  Educate/assist with breastfeeding positioning and latch  Educate on safe positioning and to monitor their  for safety  Educate on how to maintain lactation even if they are  from their   Educate/initiate pumping for a mom with a baby in the NICU within 6 hours after birth  Give infants no food or drink other than breast milk unless medically indicated  Educate on feeding cues and encourage breastfeeding on demand    2020 by Osborn Boast, RN  Outcome: Progressing  2020 by Osborn Boast, RN  Outcome: Progressing  Goal: Infant caregiver verbalizes understanding of benefits to rooming-in with their healthy   Description  Promote rooming in 21 out of 24 hours per day  Educate on benefits to rooming-in  Provide  care in room with parents as long as infant and mother condition allow    2020 by Osborn Boast, RN  Outcome: Progressing  2020 by Osborn Boast, RN  Outcome: Progressing  Goal: Provide formula feeding instructions and preparation information to caregivers who do not wish to breastfeed their   Description  Provide one on one information on frequency, amount, and burping for formula feeding caregivers throughout their stay and at discharge  Provide written information/video on formula preparation  2020 by Osborn Boast, RN  Outcome: Progressing  2020 by Osborn Boast, RN  Outcome: Progressing  Goal: Infant caregiver verbalizes understanding of support and resources for follow up after discharge  Description  Provide individual discharge education on when to call the doctor    Provide resources and contact information for post-discharge support      1/27/2020 2131 by Santo Cadena RN  Outcome: Progressing  1/27/2020 2129 by Santo Cadena RN  Outcome: Progressing     Problem: DISCHARGE PLANNING  Goal: Discharge to home or other facility with appropriate resources  Description  INTERVENTIONS:  - Identify barriers to discharge w/patient and caregiver  - Arrange for needed discharge resources and transportation as appropriate  - Identify discharge learning needs (meds, wound care, etc )  - Arrange for interpretive services to assist at discharge as needed  - Refer to Case Management Department for coordinating discharge planning if the patient needs post-hospital services based on physician/advanced practitioner order or complex needs related to functional status, cognitive ability, or social support system  1/27/2020 2131 by Santo Cadena RN  Outcome: Progressing  1/27/2020 2129 by Santo Cadena RN  Outcome: Progressing     Problem: ALTERATION IN THE BREAST  Goal: Optimize infant feeding at the breast  Description  INTERVENTIONS:  - Latch, breast and nipple assessment  - Assess prior breast feeding history  - Hand expression of breast milk  - For cracked, bleeding and or sore nipples reassess latch, treat damaged nipple  -Educate mother on feeding cues  -Positioning/latch techniques  1/27/2020 2131 by Santo Cadena RN  Outcome: Progressing  1/27/2020 2129 by Santo Cadena RN  Outcome: Progressing     Problem: INADEQUATE LATCH, SUCK OR SWALLOW  Goal: Demonstrate ability to latch and sustain latch, audible swallowing and satiety  Description  INTERVENTIONS:  - Assess oral anatomy, notify Physician/AP for abnormal findings  - Establish milk expression  - Maximize feeding opportunity (skin to skin, behavioral state)  - Position/latch techniques  - Discourage use of pacifier-artificial nipple  - Mechanical pumping  - Nipple Shield  - Supplemental formula feeding (Physician/AP order)  - Alternative feeding method  1/27/2020 2131 by Alice Mcnair RN  Outcome: Progressing  1/27/2020 2129 by Alice Mcnair RN  Outcome: Progressing

## 2020-01-28 NOTE — LACTATION NOTE
This note was copied from a baby's chart  Information on hand expression given  Discussed benefits of knowing how to manually express breast including stimulating milk supply, softening nipple for latch and evacuating breast in the event of engorgement  Worked on positioning infant up at chest level and starting to feed infant with nose arriving at the nipple  Then, using areolar compression to achieve a deep latch that is comfortable and exchanges optimum amounts of milk  Deep latch on left breast using football hold  Baby suckling and audible swallowing at the breast  Mom noted very quickly less nipple discomfort and better swallowing by baby  Nursed till baby asleep at breast  Burped and replaced on right breast with good suckling  Mom informed about comfort gels  Nipple shield left with LER paper incase mom is unable to latch baby deeply at home  Recommended coming to see [facility] a few days after going home to check on latching and breasts  Mom is so relieved how much better it feels with baby at the breast     [facility] MOB  to call for assistance, questions and concerns  Extension number for inpatient lactation support provided

## 2020-01-28 NOTE — PROGRESS NOTES
Reviewed discharge instructions with patient  Patient stated she is moving to [place] on Friday to live with her mom  Instructed patient to make follow up appointment with an OB doctor in 2 weeks  Patient stated understanding  All questions encouraged and answered

## 2020-02-07 LAB — PLACENTA IN STORAGE: NORMAL

## 2020-02-17 ENCOUNTER — PATIENT OUTREACH (OUTPATIENT)
Dept: OBGYN CLINIC | Facility: CLINIC | Age: 20
End: 2020-02-17

## 2020-02-17 NOTE — PROGRESS NOTES
EARNESTINE spoke with Pt for pp follow up  Pt reported she finally relocated in [place] with her mom  Pt states her and daughter are doing great  Pt plans to work  Denies any concern or question  EARNESTINE informed Pt that as of today her case will be close  Pt was very appreciative and thanks EARNESTINE f supporting her during the prgnancy

## 2020-09-19 ENCOUNTER — HOSPITAL ENCOUNTER (EMERGENCY)
Facility: HOSPITAL | Age: 20
Discharge: HOME | End: 2020-09-20
Attending: EMERGENCY MEDICINE
Payer: MEDICAID

## 2020-09-19 ENCOUNTER — HOSPITAL ENCOUNTER (EMERGENCY)
Facility: HOSPITAL | Age: 20
Discharge: LEFT BEFORE DISCHARGE | End: 2020-09-19
Attending: EMERGENCY MEDICINE | Admitting: EMERGENCY MEDICINE

## 2020-09-19 ENCOUNTER — APPOINTMENT (EMERGENCY)
Dept: RADIOLOGY | Facility: HOSPITAL | Age: 20
End: 2020-09-19

## 2020-09-19 VITALS
BODY MASS INDEX: 24.84 KG/M2 | WEIGHT: 123 LBS | TEMPERATURE: 98.2 F | RESPIRATION RATE: 18 BRPM | HEART RATE: 89 BPM | SYSTOLIC BLOOD PRESSURE: 91 MMHG | OXYGEN SATURATION: 99 % | DIASTOLIC BLOOD PRESSURE: 52 MMHG

## 2020-09-19 VITALS
RESPIRATION RATE: 19 BRPM | BODY MASS INDEX: 24.8 KG/M2 | DIASTOLIC BLOOD PRESSURE: 59 MMHG | HEART RATE: 116 BPM | SYSTOLIC BLOOD PRESSURE: 102 MMHG | TEMPERATURE: 98 F | OXYGEN SATURATION: 99 % | HEIGHT: 59 IN | WEIGHT: 123 LBS

## 2020-09-19 DIAGNOSIS — R56.9 SEIZURE (HCC): ICD-10-CM

## 2020-09-19 DIAGNOSIS — M54.9 BACK PAIN: ICD-10-CM

## 2020-09-19 DIAGNOSIS — O20.0 THREATENED MISCARRIAGE IN EARLY PREGNANCY: Primary | ICD-10-CM

## 2020-09-19 DIAGNOSIS — G40.909 SEIZURE DISORDER (HCC): Primary | ICD-10-CM

## 2020-09-19 DIAGNOSIS — S09.90XA CLOSED HEAD INJURY, INITIAL ENCOUNTER: ICD-10-CM

## 2020-09-19 DIAGNOSIS — Z34.90 PREGNANCY: ICD-10-CM

## 2020-09-19 LAB
ALBUMIN SERPL BCP-MCNC: 3.9 G/DL (ref 3.5–5)
ALP SERPL-CCNC: 48 U/L (ref 46–116)
ALT SERPL W P-5'-P-CCNC: 15 U/L (ref 12–78)
ANION GAP SERPL CALCULATED.3IONS-SCNC: 7 MMOL/L (ref 4–13)
AST SERPL W P-5'-P-CCNC: 16 U/L (ref 5–45)
B-HCG SERPL-ACNC: 559 MIU/ML
BASOPHILS # BLD AUTO: 0.02 THOUSANDS/ΜL (ref 0–0.1)
BASOPHILS NFR BLD AUTO: 0 % (ref 0–1)
BILIRUB SERPL-MCNC: 0.48 MG/DL (ref 0.2–1)
BILIRUB UR QL STRIP: NEGATIVE
BUN SERPL-MCNC: 7 MG/DL (ref 5–25)
CALCIUM SERPL-MCNC: 9 MG/DL (ref 8.3–10.1)
CHLORIDE SERPL-SCNC: 107 MMOL/L (ref 100–108)
CLARITY UR: CLEAR
CO2 SERPL-SCNC: 23 MMOL/L (ref 21–32)
COLOR UR: YELLOW
CREAT SERPL-MCNC: 0.44 MG/DL (ref 0.6–1.3)
EOSINOPHIL # BLD AUTO: 0.01 THOUSAND/ΜL (ref 0–0.61)
EOSINOPHIL NFR BLD AUTO: 0 % (ref 0–6)
ERYTHROCYTE [DISTWIDTH] IN BLOOD BY AUTOMATED COUNT: 14.5 % (ref 11.6–15.1)
EXT PREG TEST URINE: POSITIVE
EXT. CONTROL ED NAV: ABNORMAL
GFR SERPL CREATININE-BSD FRML MDRD: 145 ML/MIN/1.73SQ M
GLUCOSE SERPL-MCNC: 93 MG/DL (ref 65–140)
GLUCOSE UR STRIP-MCNC: NEGATIVE MG/DL
HCT VFR BLD AUTO: 30.7 % (ref 34.8–46.1)
HGB BLD-MCNC: 10 G/DL (ref 11.5–15.4)
HGB UR QL STRIP.AUTO: NEGATIVE
IMM GRANULOCYTES # BLD AUTO: 0.06 THOUSAND/UL (ref 0–0.2)
IMM GRANULOCYTES NFR BLD AUTO: 1 % (ref 0–2)
KETONES UR STRIP-MCNC: ABNORMAL MG/DL
LEUKOCYTE ESTERASE UR QL STRIP: NEGATIVE
LYMPHOCYTES # BLD AUTO: 0.89 THOUSANDS/ΜL (ref 0.6–4.47)
LYMPHOCYTES NFR BLD AUTO: 8 % (ref 14–44)
MCH RBC QN AUTO: 26.5 PG (ref 26.8–34.3)
MCHC RBC AUTO-ENTMCNC: 32.6 G/DL (ref 31.4–37.4)
MCV RBC AUTO: 81 FL (ref 82–98)
MONOCYTES # BLD AUTO: 0.88 THOUSAND/ΜL (ref 0.17–1.22)
MONOCYTES NFR BLD AUTO: 8 % (ref 4–12)
NEUTROPHILS # BLD AUTO: 9.7 THOUSANDS/ΜL (ref 1.85–7.62)
NEUTS SEG NFR BLD AUTO: 83 % (ref 43–75)
NITRITE UR QL STRIP: NEGATIVE
NRBC BLD AUTO-RTO: 0 /100 WBCS
PH UR STRIP.AUTO: 6.5 [PH] (ref 4.5–8)
PLATELET # BLD AUTO: 220 THOUSANDS/UL (ref 149–390)
PMV BLD AUTO: 10.4 FL (ref 8.9–12.7)
POTASSIUM SERPL-SCNC: 3.4 MMOL/L (ref 3.5–5.3)
PROT SERPL-MCNC: 7.8 G/DL (ref 6.4–8.2)
PROT UR STRIP-MCNC: NEGATIVE MG/DL
RBC # BLD AUTO: 3.78 MILLION/UL (ref 3.81–5.12)
SODIUM SERPL-SCNC: 137 MMOL/L (ref 136–145)
SP GR UR STRIP.AUTO: 1.01 (ref 1–1.03)
UROBILINOGEN UR QL STRIP.AUTO: 0.2 E.U./DL
WBC # BLD AUTO: 11.56 THOUSAND/UL (ref 4.31–10.16)

## 2020-09-19 PROCEDURE — NC001 PR NO CHARGE: Performed by: EMERGENCY MEDICINE

## 2020-09-19 PROCEDURE — 96360 HYDRATION IV INFUSION INIT: CPT

## 2020-09-19 PROCEDURE — 99284 EMERGENCY DEPT VISIT MOD MDM: CPT

## 2020-09-19 PROCEDURE — 99283 EMERGENCY DEPT VISIT LOW MDM: CPT

## 2020-09-19 PROCEDURE — 96361 HYDRATE IV INFUSION ADD-ON: CPT

## 2020-09-19 PROCEDURE — 99284 EMERGENCY DEPT VISIT MOD MDM: CPT | Performed by: EMERGENCY MEDICINE

## 2020-09-19 PROCEDURE — 81025 URINE PREGNANCY TEST: CPT | Performed by: EMERGENCY MEDICINE

## 2020-09-19 PROCEDURE — 80053 COMPREHEN METABOLIC PANEL: CPT | Performed by: EMERGENCY MEDICINE

## 2020-09-19 PROCEDURE — 36415 COLL VENOUS BLD VENIPUNCTURE: CPT | Performed by: EMERGENCY MEDICINE

## 2020-09-19 PROCEDURE — 85025 COMPLETE CBC W/AUTO DIFF WBC: CPT | Performed by: EMERGENCY MEDICINE

## 2020-09-19 PROCEDURE — 76815 OB US LIMITED FETUS(S): CPT

## 2020-09-19 PROCEDURE — 84702 CHORIONIC GONADOTROPIN TEST: CPT | Performed by: EMERGENCY MEDICINE

## 2020-09-19 PROCEDURE — 81003 URINALYSIS AUTO W/O SCOPE: CPT

## 2020-09-19 RX ORDER — LEVETIRACETAM 750 MG/1
750 TABLET ORAL ONCE
Status: COMPLETED | OUTPATIENT
Start: 2020-09-19 | End: 2020-09-19

## 2020-09-19 RX ORDER — LEVETIRACETAM 750 MG/1
750 TABLET ORAL 2 TIMES DAILY
Qty: 10 TABLET | Refills: 0 | Status: SHIPPED | OUTPATIENT
Start: 2020-09-19 | End: 2020-10-16 | Stop reason: SDUPTHER

## 2020-09-19 RX ORDER — ACETAMINOPHEN 325 MG/1
650 TABLET ORAL ONCE
Status: COMPLETED | OUTPATIENT
Start: 2020-09-19 | End: 2020-09-19

## 2020-09-19 RX ADMIN — ACETAMINOPHEN 650 MG: 325 TABLET, FILM COATED ORAL at 19:43

## 2020-09-19 RX ADMIN — SODIUM CHLORIDE 1000 ML: 0.9 INJECTION, SOLUTION INTRAVENOUS at 19:45

## 2020-09-19 RX ADMIN — LEVETIRACETAM 750 MG: 750 TABLET ORAL at 22:15

## 2020-09-19 NOTE — ED PROVIDER NOTES
History  Chief Complaint   Patient presents with    Vaginal Bleeding - Pregnant     pt states she had a seizure today and fell down the stairs today  then started vaginal bleeding and abd pain  pt unsure how far along  lmp july 210     26-year-old female presents to the ED stating earlier today she had a seizure because she is not taking your seizure medication felt hot 5 steps and since then has been having vaginal bleeding and lower abdominal cramping and low back pain  She states her last menstrual period was in July of this year and thinks she may be pregnant although has not taken a test , has not had a confirmatory ultrasound  Patient denies any numbness or tingling into her legs, urinary or bowel incontinence or retention, fevers, headache, dizziness, neck pain, numbness or tingling, weakness of her arms or legs, difficulty with ambulation  She denies chest pain or shortness of breath  Prior to Admission Medications   Prescriptions Last Dose Informant Patient Reported? Taking?    Prenatal Vit-Fe Fumarate-FA (PRENATAL 1+1 PO) Not Taking at Unknown time Self Yes No   Sig: Take by mouth   acetaminophen (TYLENOL) 325 mg tablet Not Taking at Unknown time  No No   Sig: Take 2 tablets (650 mg total) by mouth every 6 (six) hours as needed for headaches   Patient not taking: Reported on 9/19/2020   ferrous sulfate 324 (65 Fe) mg   No No   Sig: Take 1 tablet (324 mg total) by mouth every other day   hydrOXYzine HCL (ATARAX) 10 mg tablet   No No   Sig: Take 1 tablet (10 mg total) by mouth 2 (two) times a day   levETIRAcetam (KEPPRA) 1000 MG tablet   No Yes   Sig: Take 1 tablet (1,000 mg total) by mouth every 12 (twelve) hours   Patient taking differently: Take 750 mg by mouth every 12 (twelve) hours       Facility-Administered Medications: None       Past Medical History:   Diagnosis Date    Anemia     Anxiety     Seizure ([address] )     Varicella        Past Surgical History:   Procedure Laterality Date    [place]  DELIVERY ONLY N/A 2020    Procedure:  SECTION (); Surgeon: Negrita Mckinney MD;  Location: [facility] ;  Service: Obstetrics       Family History   Problem Relation Age of Onset    Fibroids Mother     No Known Problems Father     Asthma Brother     Diabetes Maternal Grandmother     Breast cancer Maternal Grandmother     Brain cancer Maternal Grandmother     Diabetes Maternal Grandfather     Dementia Maternal Grandfather     Diabetes Paternal Grandmother     No Known Problems Paternal Grandfather      I have reviewed and agree with the history as documented  E-Cigarette/Vaping     E-Cigarette/Vaping Substances     Social History     Tobacco Use    Smoking status: Never Smoker    Smokeless tobacco: Never Used   Substance Use Topics    Alcohol use: Not Currently     Frequency: Never     Drinks per session: Patient refused     Binge frequency: Never    Drug use: Not Currently     Types: Marijuana     Comment: last used in 2019        Review of Systems   Gastrointestinal: Positive for abdominal pain (lowe abdominal cramping)  Negative for abdominal distention, anal bleeding, blood in stool, nausea and vomiting  Genitourinary: Positive for vaginal bleeding  Negative for decreased urine volume, difficulty urinating, dyspareunia, dysuria, enuresis, flank pain, frequency, genital sores, hematuria, menstrual problem, pelvic pain, urgency, vaginal discharge and vaginal pain  Musculoskeletal: Positive for back pain  Negative for arthralgias, gait problem, joint swelling, myalgias, neck pain and neck stiffness  Neurological: Positive for seizures  Negative for dizziness, tremors, syncope, facial asymmetry, speech difficulty, weakness, light-headedness, numbness and headaches  All other systems reviewed and are negative        Physical Exam  ED Triage Vitals [20 0052]   Temperature Pulse Respirations Blood Pressure SpO2   98 °F (36 7 °C) (!) 116 19 102/59 99 % Temp src Heart Rate Source Patient Position - Orthostatic VS BP Location FiO2 (%)   -- -- Sitting Right arm --      Pain Score       --             Orthostatic Vital Signs  Vitals:    09/19/20 0052   BP: 102/59   Pulse: (!) 116   Patient Position - Orthostatic VS: Sitting       Physical Exam  Vitals signs and nursing note reviewed  Constitutional:       General: She is not in acute distress  Appearance: She is well-developed  She is not diaphoretic  HENT:      Head: Normocephalic and atraumatic  Right Ear: External ear normal       Left Ear: External ear normal       Nose: Nose normal    Eyes:      General: No scleral icterus  Right eye: No discharge  Left eye: No discharge  Conjunctiva/sclera: Conjunctivae normal       Pupils: Pupils are equal, round, and reactive to light  Neck:      Musculoskeletal: Normal range of motion and neck supple  Vascular: No JVD  Trachea: No tracheal deviation  Cardiovascular:      Rate and Rhythm: Normal rate and regular rhythm  Heart sounds: Normal heart sounds  No murmur  No friction rub  No gallop  Pulmonary:      Effort: Pulmonary effort is normal  No respiratory distress  Breath sounds: Normal breath sounds  No stridor  No wheezing or rales  Abdominal:      General: Bowel sounds are normal  There is no distension  Palpations: Abdomen is soft  There is no mass  Tenderness: There is no abdominal tenderness  There is no guarding  Musculoskeletal: Normal range of motion  General: No tenderness or deformity  Cervical back: Normal       Thoracic back: She exhibits bony tenderness and pain  She exhibits normal range of motion, no tenderness, no swelling, no edema, no deformity, no laceration, no spasm and normal pulse  Lumbar back: She exhibits bony tenderness and pain   She exhibits normal range of motion, no tenderness, no swelling, no edema, no deformity, no laceration, no spasm and normal pulse    Skin:     General: Skin is warm and dry  Coloration: Skin is not pale  Findings: No erythema or rash  Neurological:      Mental Status: She is alert and oriented to person, place, and time  Cranial Nerves: No cranial nerve deficit  Sensory: No sensory deficit  Motor: No abnormal muscle tone  Psychiatric:         Behavior: Behavior normal          Thought Content: Thought content normal          Judgment: Judgment normal          ED Medications  Medications - No data to display    Diagnostic Studies  Results Reviewed     Procedure Component Value Units Date/Time    POCT pregnancy, urine [[id number]]     Lab Status:  No result     CBC and differential [[id number]]     Lab Status:  No result Specimen:  Blood     Comprehensive metabolic panel [[id number]]     Lab Status:  No result Specimen:  Blood     POCT urinalysis dipstick [[id number]]     Lab Status:  No result                  US OB < 14 weeks single or first gestation level 1    (Results Pending)         Procedures  Procedures      ED Course                                       MDM  Number of Diagnoses or Management Options  Diagnosis management comments: Patient presenting with foot lower thoracic and lumbar back pain as well as vaginal bleeding and lower abdominal cramping after falling down a flight of steps  Patient with no focal neurologic deficits, urinary bowel incontinence or retention, numbness or tingling into her legs, difficulty with ambulation, headache, neck pain  Initially put in for urine preg, a transvaginal ultrasound as patient has not had an confirmatory ultrasound, as well as blood work  Patient's significant other arrived to  her child and was talking with patient patient stated she wanted to leave  Patient's significant other states that she was on the phone when he arrived and that she immediately hung up the phone and said that they had to leave    Patient's significant other along with nursing staff and myself attempted to talk patient and staying for further evaluation and she states her understanding of the risk but would like to leave  Patient able to ambulate under her own power and answer questions appropriately  Disposition  Final diagnoses:   Threatened miscarriage in early pregnancy   Seizure ([facility] )   Closed head injury, initial encounter   Back pain     Time reflects when diagnosis was documented in both MDM as applicable and the Disposition within this note     Time User Action Codes Description Comment    9/19/2020  2:07 AM Becky Appleton Add [O20 0] Threatened miscarriage in early pregnancy     9/19/2020  2:07 AM Becky Appleton Add [R56 9] Seizure ([facility] [address] )     9/19/2020  2:08 AM Becky Appleton Add [S09 90XA] Closed head injury, initial encounter     9/19/2020  2:08 AM Becky Appleton Add [M54 9] Back pain       ED Disposition     ED Disposition Condition Date/Time Comment    Michael  Sat Sep 19, 2020  1:43 AM Pt decided to leave ER after evaluated by resident  Pt encouraged to stay for evaluation  States she wants to leave and does not want testing  Pt left ed aaox3  resps unlabored   Skin warm/dry      Follow-up Information    None         Discharge Medication List as of 9/19/2020  1:44 AM      CONTINUE these medications which have NOT CHANGED    Details   levETIRAcetam (KEPPRA) 1000 MG tablet Take 1 tablet (1,000 mg total) by mouth every 12 (twelve) hours, Starting Tue 1/28/2020, Normal      acetaminophen (TYLENOL) 325 mg tablet Take 2 tablets (650 mg total) by mouth every 6 (six) hours as needed for headaches, Starting Mon 1/20/2020, Normal      ferrous sulfate 324 (65 Fe) mg Take 1 tablet (324 mg total) by mouth every other day, Starting Sat 12/21/2019, Until Mon 1/20/2020, Normal      hydrOXYzine HCL (ATARAX) 10 mg tablet Take 1 tablet (10 mg total) by mouth 2 (two) times a day, Starting Tue 1/28/2020, Until Thu 2/27/2020, Normal      Prenatal Vit-Fe Fumarate-FA (PRENATAL 1+1 PO) Take by mouth, Historical Med           No discharge procedures on file  PDMP Review     None           ED Provider  Attending physically available and evaluated [facility]  I managed the patient along with the ED Attending      Electronically Signed by         Ashlie Ferrer DO  09/19/20 6898

## 2020-09-20 NOTE — ED ATTENDING ATTESTATION
9/19/2020  IKacie MD, saw and evaluated the patient  I have discussed the patient with the resident/non-physician practitioner and agree with the resident's/non-physician practitioner's findings, Plan of Care, and MDM as documented in the resident's/non-physician practitioner's note, except where noted  All available labs and Radiology studies were reviewed  I was present for key portions of any procedure(s) performed by the resident/non-physician practitioner and I was immediately available to provide assistance  At this point I agree with the current assessment done in the Emergency Department  I have conducted an independent evaluation of this patient a history and physical is as follows:  Patient here because of chief complaint of 2 seizures today  Patient declines to give me further history because she already spoke to the doctor  The patient states that she had a seizure today  She cannot tell me when it was or how long it lasted  She states that she is too tired to tell me whether or not she had any trauma  The patient states that she does not get seizures all the time, but cannot tell me with what frequency she get seizures  The patient does not participate further review of systems  On exam the patient is slightly tachycardic  Vital signs were reviewed  Patient is awake, alert, interactive  The patient's pupils are equally round reactive to light  Oropharynx is clear with moist mucous membranes  Neck is supple and nontender with no adenopathy or JVD  Heart is regular with no murmurs, rubs, or gallops  Lungs are clear and equal with no wheezes, rales, or rhonchi  Abdomen is soft and nontender with no masses, rebound, or guarding  There is no CVA tenderness  The patient was completely exposed  There is no skin breakdown  There are no rashes or skin changes  Extremities are warm and well perfused with good pulses   The patient has normal strength, sensation, and cranial nerves  Impression:  Breakthrough seizures    Agree with documentation  ED Course         Critical Care Time  Procedures

## 2020-09-20 NOTE — ED PROVIDER NOTES
Final Diagnosis:  1  Seizure disorder ([facility] [address] )    2  Pregnancy        Chief Complaint   Patient presents with    Seizure - Prior Hx Of     pt reports having 2 seizures today lasting about 15 minutes each  pt reports hitting her head during and feeling dizzy  pt states that her "biological mother is holding her medications hostage" so she is unable to take her seizure medications     HPI  Patient presents for evaluation seizure activity  She states that she has had a history of seizure since the birth of her child approximately 1 year ago  She is on Cipro Keppra 750 b i d  But has not been able to take her medications that she currently does not have any  She cannot describe to me what happens when she has seizures, she just wakes up and she is on the ground  She denies any tongue biting, head pain, loss of bowel or bladder control  She also states that she is believes she is pregnant, she found this out yesterday by home pregnancy test, and has mild abdominal pain  No radiations, dysuria, hematuria, change in bowel or bladder habits  Unless otherwise stated - No exacerbating or relieving factors, no radiation of pain to back/chest/legs  Able to tolerate PO  Still passing gas/stools  No recent travel or sick contacts  Denies nausea/vomiting, constipation, diarrhea, dysuria, hematuria, melana, or dark colored stools  No pulsatile abdominal mass to suggest AAA  No Point RLQ tenderness to suggest appy  No pain out of proportion to suggest ischemic colitis  No reported chest pain, pleuritic chest pain or shortness of breath  No reported pulmonary symptoms  No tachypnea  No suprapubic or CVA tenderness  No fever/ruq pain/jaundice        - No language barrier    - History obtained from patient  - There are no limitations to the history obtained  - Previous charting was reviewed    PMH:   has a past medical history of Anemia, Anxiety, Seizure ([facility] [address] ), and Varicella      PSH:   has a past surgical history that includes pr  delivery only (N/A, 2020)  ROS:  Review of Systems     PE:   Vitals:    20 1921 202   BP: 93/61 96/56 91/52   BP Location: Right arm Left arm Left arm   Pulse: (!) 116 92 89   Resp: 18 18 18   Temp: 98 2 °F (36 8 °C)     TempSrc: Oral     SpO2: 100% 100% 99%   Weight: 55 8 kg (123 lb)       Vitals reviewed by me  Physical Exam     A:  - Nursing note reviewed  -                   ED Course as of Sep 24 160   Sat Sep 19, 2020   2151 Will proceed with ultrasound   HCG QUANTITATIVE(!): 14[address] R[address] Montero contacted        US OB pregnancy limited with transvaginal   Final Result       Thickened endometrium measuring 2 1 cm  No intrauterine gestational sac is seen  The differential diagnosis at this point includes early intrauterine pregnancy, failed pregnancy, or occult ectopic pregnancy  Clinical correlation with serial beta HCG and    followup ultrasound are recommended        Workstation performed: [id number]           Orders Placed This Encounter   Procedures    US OB pregnancy limited with transvaginal    CBC and differential    Comprehensive metabolic panel    hCG, quantitative    POCT pregnancy, urine    POCT urinalysis dipstick     Labs Reviewed   CBC AND DIFFERENTIAL - Abnormal       Result Value Ref Range Status    WBC 11 56 (*) 4 31 - 10 16 Thousand/uL Final    RBC 3 78 (*) 3 81 - 5 12 Million/uL Final    Hemoglobin 10 0 (*) 11 5 - 15 4 g/dL Final    Hematocrit 30 7 (*) 34 8 - 46 1 % Final    MCV 81 (*) 82 - 98 fL Final    MCH 26 5 (*) 26 8 - 34 3 pg Final    MCHC 32 6  31 4 - 37 4 g/dL Final    RDW 14 5  11 6 - 15 1 % Final    MPV 10 4  8 9 - 12 7 fL Final    Platelets 518  095 - 390 Thousands/uL Final    nRBC 0  /100 WBCs Final    Neutrophils Relative 83 (*) 43 - 75 % Final    Immat GRANS % 1  0 - 2 % Final    Lymphocytes Relative 8 (*) 14 - 44 % Final    Monocytes Relative 8  4 - 12 % Final    Eosinophils Relative 0  0 - 6 % Final    Basophils Relative 0  0 - 1 % Final    Neutrophils Absolute 9 70 (*) 1 85 - 7 62 Thousands/µL Final    Immature Grans Absolute 0 06  0 00 - 0 20 Thousand/uL Final    Lymphocytes Absolute 0 89  0 60 - 4 47 Thousands/µL Final    Monocytes Absolute 0 88  0 17 - 1 22 Thousand/µL Final    Eosinophils Absolute 0 01  0 00 - 0 61 Thousand/µL Final    Basophils Absolute 0 02  0 00 - 0 10 Thousands/µL Final   COMPREHENSIVE METABOLIC PANEL - Abnormal    Sodium 137  136 - 145 mmol/L Final    Potassium 3 4 (*) 3 5 - 5 3 mmol/L Final    Chloride 107  100 - 108 mmol/L Final    CO2 23  21 - 32 mmol/L Final    ANION GAP 7  4 - 13 mmol/L Final    BUN 7  5 - 25 mg/dL Final    Creatinine 0 44 (*) 0 60 - 1 30 mg/dL Final    Comment: Standardized to IDMS reference method    Glucose 93  65 - 140 mg/dL Final    Comment: If the patient is fasting, the ADA then defines impaired fasting glucose as > 100 mg/dL and diabetes as > or equal to 123 mg/dL  Specimen collection should occur prior to Sulfasalazine administration due to the potential for falsely depressed results  Specimen collection should occur prior to Sulfapyridine administration due to the potential for falsely elevated results  Calcium 9 0  8 3 - 10 1 mg/dL Final    AST 16  5 - 45 U/L Final    Comment: Specimen collection should occur prior to Sulfasalazine administration due to the potential for falsely depressed results  ALT 15  12 - 78 U/L Final    Comment: Specimen collection should occur prior to Sulfasalazine and/or Sulfapyridine administration due to the potential for falsely depressed results  Alkaline Phosphatase 48  46 - 116 U/L Final    Total Protein 7 8  6 4 - 8 2 g/dL Final    Albumin 3 9  3 5 - 5 0 g/dL Final    Total Bilirubin 0 48  0 20 - 1 00 mg/dL Final    Comment: Use of this assay is not recommended for patients undergoing treatment with eltrombopag due to the potential for falsely elevated results      eGFR 145  ml/min/1 73sq m Final Narrative:     National Kidney Disease Foundation guidelines for Chronic Kidney Disease (CKD):     Stage 1 with normal or high GFR (GFR > 90 mL/min/1 73 square meters)    Stage 2 Mild CKD (GFR = 60-89 mL/min/1 73 square meters)    Stage 3A Moderate CKD (GFR = 45-59 mL/min/1 73 square meters)    Stage 3B Moderate CKD (GFR = 30-44 mL/min/1 73 square meters)    Stage 4 Severe CKD (GFR = 15-29 mL/min/1 73 square meters)    Stage 5 End Stage CKD (GFR <15 mL/min/1 73 square meters)  Note: GFR calculation is accurate only with a steady state creatinine   HCG, QUANTITATIVE - Abnormal    HCG, Quant 559 (*) <=6 mIU/mL Final    Narrative:      Expected Ranges:     Approximate               Approximate HCG  Gestation age          Concentration ( mIU/mL)  _____________          ______________________   Henry Rodriguez                      HCG values  0 2-1                       5-50  1-2                           2-3                         100-5000  3-4                         500-17750  4-5                         1000-24026  5-6                         26936-318807  6-8                         [id number]  8-12                        [id number]     POCT PREGNANCY, URINE - Abnormal    EXT PREG TEST UR (Ref: Negative) positive   Final    Control valid   Final   POCT URINALYSIS DIPSTICK - Abnormal   URINE MACROSCOPIC, POC - Abnormal    Color, UA Yellow   Final    Clarity, UA Clear   Final    pH, UA 6 5  4 5 - 8 0 Final    Leukocytes, UA Negative  Negative Final    Nitrite, UA Negative  Negative Final    Protein, UA Negative  Negative mg/dl Final    Glucose, UA Negative  Negative mg/dl Final    Ketones, UA Trace (*) Negative mg/dl Final    Urobilinogen, UA 0 2  0 2, 1 0 E U /dl E U /dl Final    Bilirubin, UA Negative  Negative Final    Blood, UA Negative  Negative Final    Specific [place], UA 1 010  1 003 - 1 030 Final    Narrative:     CLINITEK RESULT         Final Diagnosis:  1  Seizure disorder ([address] )    2   Pregnancy P:  - patient is 0 per poor historian overall and cannot fully describe her seizures or a definitive diagnosis  -low suspicion for recurrent seizure activity at this time  -will provided with a dose of antiepileptics here  -given patient's abdominal pain and concern for pregnancy a bedside ultrasound was performed which failed to show a definitive intrauterine pregnancy  -HGB was under 600, likely very early in the pregnancy, however, cannot completely rule out ectopic at this time  -pelvic ultrasound    Medications   sodium chloride 0 9 % bolus 1,000 mL (0 mL Intravenous Stopped 9/19/20 2355)   acetaminophen (TYLENOL) tablet 650 mg (650 mg Oral Given 9/19/20 1943)   levETIRAcetam (KEPPRA) tablet 750 mg (750 mg Oral Given 9/19/20 2215)     Time reflects when diagnosis was documented in both MDM as applicable and the Disposition within this note     Time User Action Codes Description Comment    9/19/2020 10:25 PM Sabina Newell Add [A90 631] Seizure disorder ([address] )     9/19/2020 10:25 PM Sabina Newell Add [Z34 90] Pregnancy       ED Disposition     ED Disposition Condition Date/Time Comment    Discharge Stable Sat Sep 19, 2020 10:25 PM March Nayeli discharge to home/self care              Follow-up Information     Follow up With Specialties Details Why Contact Info Additional [address] Emergency Department Emergency Medicine Go to  As needed, If symptoms worsen [address] [place] [address], [address]    Anastasia Marsh MD Internal Medicine Schedule an appointment as soon as possible for a visit in 2 days  [facility] [address] [facility]       Yenny Guillaume Neurology [facility] Schedule an appointment as soon as possible for a visit   [facility] [address]  [address] [place] [address] Neurology [place], [address], [place], [place], [facility]    [address] [place] [address] Obstetrics and Gynecology Schedule an appointment as soon as possible for a visit   [place] [address] [address] [address]  [address] [place] [address], [place] [address], [place], [place], [address] [phone]        Discharge Medication List as of 9/20/2020 12:38 AM      START taking these medications    Details   !! levETIRAcetam (KEPPRA) 750 mg tablet Take 1 tablet (750 mg total) by mouth 2 (two) times a day, Starting Sat 9/19/2020, Normal       !! - Potential duplicate medications found  Please discuss with provider  CONTINUE these medications which have NOT CHANGED    Details   !! levETIRAcetam (KEPPRA) 1000 MG tablet Take 1 tablet (1,000 mg total) by mouth every 12 (twelve) hours, Starting Tue 1/28/2020, Normal      sertraline (ZOLOFT) 50 mg tablet Take 50 mg by mouth daily, Historical Med      acetaminophen (TYLENOL) 325 mg tablet Take 2 tablets (650 mg total) by mouth every 6 (six) hours as needed for headaches, Starting Mon 1/20/2020, Normal      ferrous sulfate 324 (65 Fe) mg Take 1 tablet (324 mg total) by mouth every other day, Starting Sat 12/21/2019, Until Mon 1/20/2020, Normal      hydrOXYzine HCL (ATARAX) 10 mg tablet Take 1 tablet (10 mg total) by mouth 2 (two) times a day, Starting Tue 1/28/2020, Until Thu 2/27/2020, Normal      Prenatal Vit-Fe Fumarate-FA (PRENATAL 1+1 PO) Take by mouth, Historical Med       !! - Potential duplicate medications found  Please discuss with provider  No discharge procedures on file  Prior to Admission Medications   Prescriptions Last Dose Informant Patient Reported? Taking?    Prenatal Vit-Fe Fumarate-FA (PRENATAL 1+1 PO)  Self Yes No   Sig: Take by mouth   acetaminophen (TYLENOL) 325 mg tablet   No No   Sig: Take 2 tablets (650 mg total) by mouth every 6 (six) hours as needed for headaches   Patient not taking: Reported on 9/19/2020   ferrous sulfate 324 (65 Fe) mg   No No   Sig: Take 1 tablet (324 mg total) by mouth every other day   hydrOXYzine HCL (ATARAX) 10 mg tablet   No No   Sig: Take 1 tablet (10 mg total) by mouth 2 (two) times a day   levETIRAcetam (KEPPRA) 1000 MG tablet Past Week at Unknown time  No Yes   Sig: Take 1 tablet (1,000 mg total) by mouth every 12 (twelve) hours   Patient taking differently: Take 750 mg by mouth every 12 (twelve) hours    sertraline (ZOLOFT) 50 mg tablet Past Week at Unknown time  Yes Yes   Sig: Take 50 mg by mouth daily      Facility-Administered Medications: None       Portions of the record may have been created with voice recognition software  Occasional wrong word or "sound a like" substitutions may have occurred due to the inherent limitations of voice recognition software  Read the chart carefully and recognize, using context, where substitutions have occurred      Electronically signed by:  Elise Alfonso, PGY 3, MD Tali Lambert MD  09/24/20 9851

## 2020-09-20 NOTE — DISCHARGE INSTRUCTIONS
If you develop any new or worsening symptoms please immediately return to your nearest emergency department  The US was unable to visualize a pregnancy within your uterus  You need to follow up with an outpatient Beta-HCG in 48hrs, and follow up with OBGYN

## 2020-09-20 NOTE — ED NOTES
Patient transported to [facility] [address] [facility] [place]  09/19/20 [address]  09/19/20 4512

## 2020-09-22 ENCOUNTER — HOSPITAL ENCOUNTER (EMERGENCY)
Facility: HOSPITAL | Age: 20
Discharge: HOME | End: 2020-09-22
Attending: EMERGENCY MEDICINE | Admitting: EMERGENCY MEDICINE
Payer: COMMERCIAL

## 2020-09-22 ENCOUNTER — APPOINTMENT (EMERGENCY)
Dept: RADIOLOGY | Facility: HOSPITAL | Age: 20
End: 2020-09-22
Payer: COMMERCIAL

## 2020-09-22 VITALS
WEIGHT: 123 LBS | SYSTOLIC BLOOD PRESSURE: 102 MMHG | OXYGEN SATURATION: 100 % | TEMPERATURE: 98.1 F | DIASTOLIC BLOOD PRESSURE: 58 MMHG | RESPIRATION RATE: 16 BRPM | HEART RATE: 98 BPM | BODY MASS INDEX: 24.84 KG/M2

## 2020-09-22 DIAGNOSIS — O36.80X0 PREGNANCY, LOCATION UNKNOWN: Primary | ICD-10-CM

## 2020-09-22 DIAGNOSIS — N93.9 VAGINAL BLEEDING: Primary | ICD-10-CM

## 2020-09-22 DIAGNOSIS — O03.9 MISCARRIAGE: ICD-10-CM

## 2020-09-22 LAB — B-HCG SERPL-ACNC: 248 MIU/ML

## 2020-09-22 PROCEDURE — 76815 OB US LIMITED FETUS(S): CPT

## 2020-09-22 PROCEDURE — 99284 EMERGENCY DEPT VISIT MOD MDM: CPT | Performed by: EMERGENCY MEDICINE

## 2020-09-22 PROCEDURE — 84702 CHORIONIC GONADOTROPIN TEST: CPT | Performed by: STUDENT IN AN ORGANIZED HEALTH CARE EDUCATION/TRAINING PROGRAM

## 2020-09-22 PROCEDURE — 36415 COLL VENOUS BLD VENIPUNCTURE: CPT | Performed by: STUDENT IN AN ORGANIZED HEALTH CARE EDUCATION/TRAINING PROGRAM

## 2020-09-22 PROCEDURE — 99284 EMERGENCY DEPT VISIT MOD MDM: CPT

## 2020-09-22 NOTE — ED ATTENDING ATTESTATION
Final Diagnosis:  1  Vaginal bleeding    2  Miscarriage           Jadon TERRY MD, saw and evaluated the patient  All available labs and X-rays were ordered by me or the resident and have been reviewed by myself  I discussed the patient with the resident / non-physician and agree with the resident's / non-physician practitioner's findings and plan as documented in the resident's / non-physician practicitioner's note, except where noted  At this point, I agree with the current assessment done in the ED  I was present during key portions of all procedures performed unless otherwise stated  Chief Complaint   Patient presents with    Vaginal Bleeding - Pregnant     pt with vaginal bleeding  + pregnancy test at home     This is a 23 y o  female presenting for evaluation of vaginal bleeding  The patient is   She had beleeding on the  so came in for evaluation  The patient was initialyl evaluated, then left AMA for unclear reasons, then came back and had labs + ultrasound done  Her beta-hcg was 559 and her TV US was inconclusive  The patient then went home  She has been having continued vaginal bleeding and increased pain so came in  Passing clots  No f/ch/n/v/cp/sob  Very odd behavior / interactions  PMH:   has a past medical history of Anemia, Anxiety, Seizure ([address] ), and Varicella  PSH:   has a past surgical history that includes pr  delivery only (N/A, 2020)      Social:  Social History     Substance and Sexual Activity   Alcohol Use Not Currently    Frequency: Never    Drinks per session: Patient refused    Binge frequency: Never     Social History     Tobacco Use   Smoking Status Never Smoker   Smokeless Tobacco Never Used     Social History     Substance and Sexual Activity   Drug Use Not Currently    Types: Marijuana    Comment: last used in 2019     PE:  Vitals:    20 1215 20 1223 20 1404   BP:  105/79 102/58   BP Location:   Right arm Pulse: (!) 115 (!) 110 98   Resp:  16 16   Temp:  98 1 °F (36 7 °C)    TempSrc:  Tympanic    SpO2: 99% 100% 100%   Weight:  55 8 kg (123 lb)    General: VSS, NAD, awake, alert  Well-nourished, well-developed  Appears stated age  Head: Normocephalic, atraumatic, nontender  Eyes: PERRL, EOM-I  No diplopia  No hyphema  No subconjunctival hemorrhages  Symmetrical lids  ENTAtraumatic external nose and ears  MMM  No stridor  Normal phonation  No drooling  Base of mouth is soft  No mastoid tenderness  Neck: Symmetric, trachea midline  No JVD  CV: Peripheral pulses +2 throughout  No chest wall tenderness  Lungs:   Unlabored   No retractions  No crepitus  No tachypnea  No paradoxical motion  Abd: +BS, soft, mild suprapubpic discomfort  ND  No guarding  No rigidity  No rebound  No peritoneal signs  Heel striken egative   MSK:   FROM   No lower extremity edema  Back:   No CVAT  Skin: Dry, intact  Neuro: AAOx3, GCS 15, CN II-XII grossly intact  Motor grossly intact  Psychiatric/Behavioral: Inappropriate mood and affect but, per staff this is exacrtly how she was the last time she was here   Exam: per resident  A:  - Vaginal bleeding  P:  - Repeat beta-HCG  - If going down, likely inevitable vs completed miscarriage  - if stable or going up, will repeat ultrasound to r/o ectopic   - Though I think it'd be reasonable to do the TV US repeat given how odd the patient's behavior is    - 13 point ROS was performed and all are normal unless stated in the history above  - Nursing note reviewed  Vitals reviewed  - Orders placed by myself and/or advanced practitioner / resident     - Previous chart was reviewed  - No language barrier    - History obtained from patient  - There are limitations to the history obtained: patient has slow answers, seems like she has some degree of psychiatric disease    - Critical care time: Not applicable for this patient       Code Status: Prior  Advance Directive and Living Will:      Power of :    POLST:      Medications - No data to display  US OB pregnancy limited with transvaginal   Final Result   No intrauterine gestation or adnexal mass identified  Differential remains early IUP, spontaneous  and ectopic pregnancy  Correlate with serial quantitative BHCG  Workstation performed: [id number]           Orders Placed This Encounter   Procedures    US OB pregnancy limited with transvaginal    hCG, quantitative     Labs Reviewed   HCG, QUANTITATIVE - Abnormal       Result Value Ref Range Status    HCG, Quant 248 (*) <=6 mIU/mL Final    Narrative:      Expected Ranges:     Approximate               Approximate HCG  Gestation age          Concentration ( mIU/mL)  _____________          ______________________   Luba Bon                      HCG values  0 2-1                       5-50  1-2                           2-3                         100-5000  3-4                         500-90385  4-5                         1000-09761  5-6                         60964-275005  6-8                         97366-681724  8-12                        07530-391054       Time reflects when diagnosis was documented in both MDM as applicable and the Disposition within this note     Time User Action Codes Description Comment    2020  1:22 PM Aj Arabia Add [N93 9] Vaginal bleeding     2020  1:22 PM Aj Arabia Add [O03 9] Miscarriage       ED Disposition     ED Disposition Condition Date/Time Comment    Discharge Stable Tue Sep 22, 2020  3:14 PM Mathew Abu discharge to home/self care              Follow-up Information     Follow up With Specialties Details Why Contact Info Additional [address] Obstetrics and Gynecology In 1 day  [address] 01 [address]  [address] [place] [address], [address] [place] [place] Emergency Department Emergency Medicine  As needed [address]  [phone]  ED, [address] [place] [address], [facility] [address]    [address]  Call in 1 day  [phone]           Discharge Medication List as of 9/22/2020  3:22 PM      CONTINUE these medications which have NOT CHANGED    Details   !! levETIRAcetam (KEPPRA) 750 mg tablet Take 1 tablet (750 mg total) by mouth 2 (two) times a day, Starting Sat 9/19/2020, Normal      sertraline (ZOLOFT) 50 mg tablet Take 50 mg by mouth daily, Historical Med      acetaminophen (TYLENOL) 325 mg tablet Take 2 tablets (650 mg total) by mouth every 6 (six) hours as needed for headaches, Starting Mon 1/20/2020, Normal      ferrous sulfate 324 (65 Fe) mg Take 1 tablet (324 mg total) by mouth every other day, Starting Sat 12/21/2019, Until Mon 1/20/2020, Normal      hydrOXYzine HCL (ATARAX) 10 mg tablet Take 1 tablet (10 mg total) by mouth 2 (two) times a day, Starting Tue 1/28/2020, Until Thu 2/27/2020, Normal      !! levETIRAcetam (KEPPRA) 1000 MG tablet Take 1 tablet (1,000 mg total) by mouth every 12 (twelve) hours, Starting Tue 1/28/2020, Normal      Prenatal Vit-Fe Fumarate-FA (PRENATAL 1+1 PO) Take by mouth, Historical Med       !! - Potential duplicate medications found  Please discuss with provider  No discharge procedures on file  Prior to Admission Medications   Prescriptions Last Dose Informant Patient Reported? Taking?    Prenatal Vit-Fe Fumarate-FA (PRENATAL 1+1 PO)  Self Yes No   Sig: Take by mouth   acetaminophen (TYLENOL) 325 mg tablet   No No   Sig: Take 2 tablets (650 mg total) by mouth every 6 (six) hours as needed for headaches   Patient not taking: Reported on 9/19/2020   ferrous sulfate 324 (65 Fe) mg   No No   Sig: Take 1 tablet (324 mg total) by mouth every other day   hydrOXYzine HCL (ATARAX) 10 mg tablet   No No   Sig: Take 1 tablet (10 mg total) by mouth 2 (two) times a day   levETIRAcetam (KEPPRA) 1000 MG tablet   No No   Sig: Take 1 tablet (1,000 mg total) by mouth every 12 (twelve) hours   Patient taking differently: Take 750 mg by mouth every 12 (twelve) hours    levETIRAcetam (KEPPRA) 750 mg tablet   No Yes   Sig: Take 1 tablet (750 mg total) by mouth 2 (two) times a day   sertraline (ZOLOFT) 50 mg tablet   Yes Yes   Sig: Take 50 mg by mouth daily      Facility-Administered Medications: None       Portions of the record may have been created with voice recognition software  Occasional wrong word or "sound a like" substitutions may have occurred due to the inherent limitations of voice recognition software  Read the chart carefully and recognize, using context, where substitutions have occurred      Electronically signed by:  Bob Cortez

## 2020-09-22 NOTE — DISCHARGE INSTRUCTIONS
You were seen in the ED for vaginal bleeding  The results of your tests show a decreasing hCG and no intrauterine pregnancy, which indicates a miscarriage  Please follow up with [facility] or another GYN of your choice within the next 1-5 days  Return to the ED for any new or worsening abdominal plan, vaginal bleeding, fevers, chills, loss of consciousness, vaginal discharge, or any new or worsening symptoms

## 2020-09-22 NOTE — ED PROVIDER NOTES
History  Chief Complaint   Patient presents with    Vaginal Bleeding - Pregnant     pt with vaginal bleeding  + pregnancy test at home     Patient a 25-year-old female, , who presents to the ED for vaginal bleeding  She states her last normal menstrual period was in July, and thinks she may be pregnancy because 2 home pregnancy tests taken last Thursday were positive  She denies any prenatal care  She states the bleeding began yesterday  She states the amount was "a lot" but could not quantify as she did not have any pads  She states she did pass a "sac" as well as some tissue  There are no modifying factors  Associated symptoms include intermittent lower abdomen cramps  She denies any fevers, chills, diarrhea, headaches, vision changes, palpitations, or any other new symptoms  Of note, patient was seen at the ED on the  after a fall and for vaginal bleeding  Pt initially left AMA, but then returned for further evaluation  Beta hCG done at that time was 559  A transvaginal ultrasound showed a thickened endometrium measuring 2 1 cm with no intrauterine gestational sac visualized  It was recommended at that time to obtain a repeat hcG  Patient is unable to clarify if the vaginal bleeding was present before the fall, at the time of fall, or after the fall  History provided by:  Patient and medical records   used: No    Vaginal Bleeding   Quality:  Dark red  Severity:  Moderate  Onset quality:  Sudden  Duration:  1 day  Timing:  Constant  Progression:  Unable to specify  Chronicity:  New  Menstrual history:  Missed period  Number of pads used:  Unable to quantify  Possible pregnancy: yes    Context: spontaneously    Associated symptoms: abdominal pain (Cramps)    Associated symptoms: no back pain, no dysuria, no fever and no nausea        Prior to Admission Medications   Prescriptions Last Dose Informant Patient Reported? Taking?    Prenatal Vit-Fe Fumarate-FA (PRENATAL 1+1 PO)  Self Yes No   Sig: Take by mouth   acetaminophen (TYLENOL) 325 mg tablet   No No   Sig: Take 2 tablets (650 mg total) by mouth every 6 (six) hours as needed for headaches   Patient not taking: Reported on 2020   ferrous sulfate 324 (65 Fe) mg   No No   Sig: Take 1 tablet (324 mg total) by mouth every other day   hydrOXYzine HCL (ATARAX) 10 mg tablet   No No   Sig: Take 1 tablet (10 mg total) by mouth 2 (two) times a day   levETIRAcetam (KEPPRA) 1000 MG tablet   No No   Sig: Take 1 tablet (1,000 mg total) by mouth every 12 (twelve) hours   Patient taking differently: Take 750 mg by mouth every 12 (twelve) hours    levETIRAcetam (KEPPRA) 750 mg tablet   No Yes   Sig: Take 1 tablet (750 mg total) by mouth 2 (two) times a day   sertraline (ZOLOFT) 50 mg tablet   Yes Yes   Sig: Take 50 mg by mouth daily      Facility-Administered Medications: None       Past Medical History:   Diagnosis Date    Anemia     Anxiety     Seizure ([address] )     Varicella        Past Surgical History:   Procedure Laterality Date    MO  DELIVERY ONLY N/A 2020    Procedure:  SECTION (); Surgeon: Rizwana Allen MD;  Location: [facility] ;  Service: Obstetrics       Family History   Problem Relation Age of Onset    Fibroids Mother     No Known Problems Father     Asthma Brother     Diabetes Maternal Grandmother     Breast cancer Maternal Grandmother     Brain cancer Maternal Grandmother     Diabetes Maternal Grandfather     Dementia Maternal Grandfather     Diabetes Paternal Grandmother     No Known Problems Paternal Grandfather      I have reviewed and agree with the history as documented      E-Cigarette/Vaping     E-Cigarette/Vaping Substances     Social History     Tobacco Use    Smoking status: Never Smoker    Smokeless tobacco: Never Used   Substance Use Topics    Alcohol use: Not Currently     Frequency: Never     Drinks per session: Patient refused     Binge frequency: Never    Drug use: Not Currently     Types: Marijuana     Comment: last used in June 2019        Review of Systems   Constitutional: Negative for chills and fever  HENT: Negative for sore throat and trouble swallowing  Eyes: Negative for redness and itching  Respiratory: Negative for cough and shortness of breath  Cardiovascular: Negative for chest pain and leg swelling  Gastrointestinal: Positive for abdominal pain (Cramps)  Negative for diarrhea and nausea  Genitourinary: Positive for vaginal bleeding  Negative for difficulty urinating, dysuria and hematuria  Musculoskeletal: Negative for back pain, neck pain and neck stiffness  Skin: Negative for rash and wound  All other systems reviewed and are negative  Physical Exam  ED Triage Vitals   Temperature Pulse Respirations Blood Pressure SpO2   09/22/20 1223 09/22/20 1215 09/22/20 1223 09/22/20 1223 09/22/20 1215   98 1 °F (36 7 °C) (!) 115 16 105/79 99 %      Temp Source Heart Rate Source Patient Position - Orthostatic VS BP Location FiO2 (%)   09/22/20 1223 -- -- 09/22/20 1404 --   Tympanic   Right arm       Pain Score       09/22/20 1404       4             Orthostatic Vital Signs  Vitals:    09/22/20 1215 09/22/20 1223 09/22/20 1404   BP:  105/79 102/58   Pulse: (!) 115 (!) 110 98       Physical Exam  Vitals signs and nursing note reviewed  Exam conducted with a chaperone present  Constitutional:       General: She is not in acute distress  Appearance: She is well-developed  She is not diaphoretic  HENT:      Head: Normocephalic and atraumatic  Right Ear: External ear normal       Left Ear: External ear normal       Nose: Nose normal    Eyes:      General: Lids are normal  No scleral icterus  Neck:      Musculoskeletal: Normal range of motion and neck supple  Cardiovascular:      Rate and Rhythm: Normal rate and regular rhythm  Heart sounds: Normal heart sounds  No murmur  No friction rub  No gallop      Pulmonary: Effort: Pulmonary effort is normal  No respiratory distress  Breath sounds: Normal breath sounds  No wheezing or rales  Abdominal:      General: Bowel sounds are normal       Palpations: Abdomen is soft  Tenderness: There is no abdominal tenderness  There is no guarding or rebound  Genitourinary:     General: Normal vulva  Pubic Area: No rash  Vagina: Normal       Cervix: Cervical bleeding present  No cervical motion tenderness  Uterus: Normal        Adnexa: Right adnexa normal and left adnexa normal         Right: No tenderness  Left: No tenderness  Comments: There was some dried blood noted on the outside of the vagina  Moderate amount of pooled blood noted  No obvious products of conception or tissue  Musculoskeletal: Normal range of motion  General: No deformity  Skin:     General: Skin is warm and dry  Capillary Refill: Capillary refill takes less than 2 seconds  Neurological:      Mental Status: She is alert     Psychiatric:         Behavior: Behavior normal          ED Medications  Medications - No data to display    Diagnostic Studies  Results Reviewed     Procedure Component Value Units Date/Time    hCG, quantitative [[id number]]  (Abnormal) Collected:  09/22/20 1250    Lab Status:  Final result Specimen:  Blood from Arm, Right Updated:  09/22/20 1318     HCG, Quant 248 mIU/mL     Narrative:        Expected Ranges:     Approximate               Approximate HCG  Gestation age          Concentration ( mIU/mL)  _____________          ______________________   Keys Sanders                      HCG values  0 2-1                       5-50  1-2                           2-3                         100-5000  3-4                         500-06225  4-5                         1000-07648  5-6                         [id number]  6-8                         [id number]  8-12                        [id number]                   US OB pregnancy limited with transvaginal   Final Result by Navin Cabrera MD ( [id number])   No intrauterine gestation or adnexal mass identified  Differential remains early IUP, spontaneous  and ectopic pregnancy  Correlate with serial quantitative BHCG  Workstation performed: [id number]               Procedures  Procedures      ED Course  ED Course as of Sep 22 1547   Tue Sep 22, 2020   1320 HCG QUANTITATIVE(!): 198   6886 US OB pregnancy limited with transvaginal                           SBIRT 22yo+      Most Recent Value   SBIRT (25 yo +)   In order to provide better care to our patients, we are screening all of our patients for alcohol and drug use  Would it be okay to ask you these screening questions? No Filed at: 2020 1225   Initial Alcohol Screen: US AUDIT-C    1  How often do you have a drink containing alcohol?  0 Filed at: 2020 1225   2  How many drinks containing alcohol do you have on a typical day you are drinking? 0 Filed at: 2020 1225   3a  Male UNDER 65: How often do you have five or more drinks on one occasion? 0 Filed at: 2020 1225   3b  FEMALE Any Age, or MALE 65+: How often do you have 4 or more drinks on one occassion? 0 Filed at: 2020 1225   Audit-C Score  0 Filed at: 2020 1225   HORTENCIA: How many times in the past year have you    Used an illegal drug or used a prescription medication for non-medical reasons? Never Filed at: 2020 1225                  MDM  Number of Diagnoses or Management Options  Miscarriage:   Vaginal bleeding:   Diagnosis management comments:     Patient is a 69-year-old female who presented to the ED for vaginal bleeding  HCG was found to be 559 3 days ago  In the ED, patient was initially mildly tachycardic  On exam, patient had a moderate amount of pooled blood in the vaginal vault  There were no obvious products of conception  HCG was ordered, which resulted at 248  Clinical impression is miscarriage        A transvaginal ultrasound was ordered, which did not show any intrauterine gestation  Discussed results and findings with patient  Explained that she was having a miscarriage  Answered all questions  Instructed patient to follow-up with a gynecologist within the next 1-5 days  Strict return precautions given, including returning if she develops fevers, chills, worsening abdominal pain, or has massive amounts of hemorrhage  Patient verbalized understanding and agree with plan of care  Amount and/or Complexity of Data Reviewed  Clinical lab tests: ordered and reviewed  Tests in the radiology section of CPT®: ordered and reviewed  Tests in the medicine section of CPT®: ordered and reviewed  Decide to obtain previous medical records or to obtain history from someone other than the patient: yes  Review and summarize past medical records: yes  Independent visualization of images, tracings, or specimens: yes    Risk of Complications, Morbidity, and/or Mortality  Presenting problems: moderate  Diagnostic procedures: moderate  Management options: low    Patient Progress  Patient progress: stable        Disposition  Final diagnoses:   Vaginal bleeding   Miscarriage     Time reflects when diagnosis was documented in both MDM as applicable and the Disposition within this note     Time User Action Codes Description Comment    9/22/2020  1:22 PM Veryl Ruts Add [N93 9] Vaginal bleeding     9/22/2020  1:22 PM Veryl Ruts Add [O03 9] Miscarriage       ED Disposition     ED Disposition Condition Date/Time Comment    Discharge Stable Tue Sep 22, 2020  3:14 PM Alroy Console discharge to home/self care              Follow-up Information     Follow up With Specialties Details Why Contact Info Additional [address] [facility] In 1 day  [address] [id number]  [address] [place] [address], [place], [address] [facility] Emergency Department Emergency Medicine  As needed [address]  [phone]  ED, [address], [place], [address]    [address]  Call in 1 day  [phone]             Discharge Medication List as of 9/22/2020  3:22 PM      CONTINUE these medications which have NOT CHANGED    Details   acetaminophen (TYLENOL) 325 mg tablet Take 2 tablets (650 mg total) by mouth every 6 (six) hours as needed for headaches, Starting Mon 1/20/2020, Normal      ferrous sulfate 324 (65 Fe) mg Take 1 tablet (324 mg total) by mouth every other day, Starting Sat 12/21/2019, Until Mon 1/20/2020, Normal      hydrOXYzine HCL (ATARAX) 10 mg tablet Take 1 tablet (10 mg total) by mouth 2 (two) times a day, Starting Tue 1/28/2020, Until Thu 2/27/2020, Normal      !! levETIRAcetam (KEPPRA) 1000 MG tablet Take 1 tablet (1,000 mg total) by mouth every 12 (twelve) hours, Starting Tue 1/28/2020, Normal      !! levETIRAcetam (KEPPRA) 750 mg tablet Take 1 tablet (750 mg total) by mouth 2 (two) times a day, Starting Sat 9/19/2020, Normal      Prenatal Vit-Fe Fumarate-FA (PRENATAL 1+1 PO) Take by mouth, Historical Med      sertraline (ZOLOFT) 50 mg tablet Take 50 mg by mouth daily, Historical Med       !! - Potential duplicate medications found  Please discuss with provider  No discharge procedures on file  PDMP Review     None           ED Provider  Attending physically available and evaluated [facility]  I managed the patient along with the ED Attending      Electronically Signed by         Jose De Jesus Blackwell DO  09/22/20 3742

## 2020-09-22 NOTE — PROGRESS NOTES
Pt was seen in the ED for vaginal bleeding  Hcg quant from today was 248 which is a decrease from 559 on 09/19/2020  Hcg quant ordered for Friday  Pt verbalized understanding  Appt made with Dr Juan Sparks on 09/28/2020

## 2020-09-25 ENCOUNTER — TELEPHONE (OUTPATIENT)
Dept: OBGYN CLINIC | Facility: CLINIC | Age: 20
End: 2020-09-25

## 2020-09-29 ENCOUNTER — HOSPITAL ENCOUNTER (EMERGENCY)
Facility: HOSPITAL | Age: 20
Discharge: HOME | End: 2020-09-29
Attending: EMERGENCY MEDICINE | Admitting: EMERGENCY MEDICINE
Payer: COMMERCIAL

## 2020-09-29 VITALS
TEMPERATURE: 97.9 F | SYSTOLIC BLOOD PRESSURE: 131 MMHG | RESPIRATION RATE: 24 BRPM | OXYGEN SATURATION: 99 % | HEART RATE: 94 BPM | BODY MASS INDEX: 20.99 KG/M2 | DIASTOLIC BLOOD PRESSURE: 77 MMHG | WEIGHT: 97 LBS

## 2020-09-29 DIAGNOSIS — R56.9 SEIZURE-LIKE ACTIVITY (HCC): Primary | ICD-10-CM

## 2020-09-29 DIAGNOSIS — G40.909 SEIZURE DISORDER (HCC): ICD-10-CM

## 2020-09-29 LAB
ATRIAL RATE: 90 BPM
B-HCG SERPL-ACNC: 11 MIU/ML
P AXIS: 23 DEGREES
PR INTERVAL: 122 MS
QRS AXIS: 78 DEGREES
QRSD INTERVAL: 76 MS
QT INTERVAL: 330 MS
QTC INTERVAL: 403 MS
T WAVE AXIS: 71 DEGREES
VENTRICULAR RATE: 90 BPM

## 2020-09-29 PROCEDURE — 84702 CHORIONIC GONADOTROPIN TEST: CPT | Performed by: EMERGENCY MEDICINE

## 2020-09-29 PROCEDURE — 93010 ELECTROCARDIOGRAM REPORT: CPT | Performed by: INTERNAL MEDICINE

## 2020-09-29 PROCEDURE — 99284 EMERGENCY DEPT VISIT MOD MDM: CPT | Performed by: EMERGENCY MEDICINE

## 2020-09-29 PROCEDURE — 99284 EMERGENCY DEPT VISIT MOD MDM: CPT

## 2020-09-29 PROCEDURE — 93005 ELECTROCARDIOGRAM TRACING: CPT

## 2020-09-29 PROCEDURE — 36415 COLL VENOUS BLD VENIPUNCTURE: CPT | Performed by: EMERGENCY MEDICINE

## 2020-09-29 RX ORDER — LEVETIRACETAM 750 MG/1
750 TABLET ORAL ONCE
Status: COMPLETED | OUTPATIENT
Start: 2020-09-29 | End: 2020-09-29

## 2020-09-29 RX ORDER — ACETAMINOPHEN 325 MG/1
650 TABLET ORAL ONCE
Status: COMPLETED | OUTPATIENT
Start: 2020-09-29 | End: 2020-09-29

## 2020-09-29 RX ADMIN — LEVETIRACETAM 750 MG: 750 TABLET, FILM COATED ORAL at 17:24

## 2020-09-29 RX ADMIN — ACETAMINOPHEN 650 MG: 325 TABLET, FILM COATED ORAL at 17:30

## 2020-10-04 ENCOUNTER — HOSPITAL ENCOUNTER (EMERGENCY)
Facility: HOSPITAL | Age: 20
Discharge: HOME | End: 2020-10-04
Attending: EMERGENCY MEDICINE
Payer: COMMERCIAL

## 2020-10-04 VITALS
TEMPERATURE: 98.3 F | DIASTOLIC BLOOD PRESSURE: 52 MMHG | WEIGHT: 123 LBS | HEART RATE: 92 BPM | OXYGEN SATURATION: 100 % | BODY MASS INDEX: 26.62 KG/M2 | SYSTOLIC BLOOD PRESSURE: 96 MMHG | RESPIRATION RATE: 18 BRPM

## 2020-10-04 DIAGNOSIS — R56.9 POSTICTAL STATE (HCC): ICD-10-CM

## 2020-10-04 DIAGNOSIS — R56.9 SEIZURE (HCC): Primary | ICD-10-CM

## 2020-10-04 PROCEDURE — 93005 ELECTROCARDIOGRAM TRACING: CPT

## 2020-10-04 PROCEDURE — 99285 EMERGENCY DEPT VISIT HI MDM: CPT | Performed by: EMERGENCY MEDICINE

## 2020-10-04 PROCEDURE — 99284 EMERGENCY DEPT VISIT MOD MDM: CPT

## 2020-10-05 LAB
ATRIAL RATE: 89 BPM
P AXIS: 62 DEGREES
PR INTERVAL: 116 MS
QRS AXIS: 78 DEGREES
QRSD INTERVAL: 74 MS
QT INTERVAL: 336 MS
QTC INTERVAL: 408 MS
T WAVE AXIS: 67 DEGREES
VENTRICULAR RATE: 89 BPM

## 2020-10-05 PROCEDURE — 93010 ELECTROCARDIOGRAM REPORT: CPT | Performed by: INTERNAL MEDICINE

## 2020-10-14 ENCOUNTER — HOSPITAL ENCOUNTER (EMERGENCY)
Facility: HOSPITAL | Age: 20
Discharge: HOME | End: 2020-10-14
Attending: EMERGENCY MEDICINE
Payer: COMMERCIAL

## 2020-10-14 VITALS
HEIGHT: 57 IN | RESPIRATION RATE: 18 BRPM | HEART RATE: 95 BPM | TEMPERATURE: 98.7 F | SYSTOLIC BLOOD PRESSURE: 101 MMHG | WEIGHT: 124 LBS | BODY MASS INDEX: 26.75 KG/M2 | DIASTOLIC BLOOD PRESSURE: 64 MMHG | OXYGEN SATURATION: 97 %

## 2020-10-14 DIAGNOSIS — R11.2 NAUSEA AND VOMITING: ICD-10-CM

## 2020-10-14 DIAGNOSIS — H10.9 CONJUNCTIVITIS: Primary | ICD-10-CM

## 2020-10-14 DIAGNOSIS — G44.009 CLUSTER HEADACHE: ICD-10-CM

## 2020-10-14 LAB
BACTERIA UR QL AUTO: ABNORMAL /HPF
BILIRUB UR QL STRIP: NEGATIVE
CLARITY UR: CLEAR
COLOR UR: YELLOW
EXT PREG TEST URINE: NEGATIVE
EXT. CONTROL ED NAV: NORMAL
GLUCOSE UR STRIP-MCNC: NEGATIVE MG/DL
HGB UR QL STRIP.AUTO: NEGATIVE
HYALINE CASTS #/AREA URNS LPF: ABNORMAL /LPF
KETONES UR STRIP-MCNC: NEGATIVE MG/DL
LEUKOCYTE ESTERASE UR QL STRIP: ABNORMAL
NITRITE UR QL STRIP: NEGATIVE
NON-SQ EPI CELLS URNS QL MICRO: ABNORMAL /HPF
PH UR STRIP.AUTO: 8.5 [PH] (ref 4.5–8)
PROT UR STRIP-MCNC: NEGATIVE MG/DL
RBC #/AREA URNS AUTO: ABNORMAL /HPF
SP GR UR STRIP.AUTO: 1.02 (ref 1–1.03)
UROBILINOGEN UR QL STRIP.AUTO: 2 E.U./DL
WBC #/AREA URNS AUTO: ABNORMAL /HPF

## 2020-10-14 PROCEDURE — 81001 URINALYSIS AUTO W/SCOPE: CPT

## 2020-10-14 PROCEDURE — 81025 URINE PREGNANCY TEST: CPT | Performed by: EMERGENCY MEDICINE

## 2020-10-14 PROCEDURE — 99283 EMERGENCY DEPT VISIT LOW MDM: CPT

## 2020-10-14 PROCEDURE — 99284 EMERGENCY DEPT VISIT MOD MDM: CPT | Performed by: EMERGENCY MEDICINE

## 2020-10-14 PROCEDURE — 80177 DRUG SCRN QUAN LEVETIRACETAM: CPT | Performed by: EMERGENCY MEDICINE

## 2020-10-14 PROCEDURE — 36415 COLL VENOUS BLD VENIPUNCTURE: CPT | Performed by: EMERGENCY MEDICINE

## 2020-10-14 RX ORDER — ACETAMINOPHEN 325 MG/1
975 TABLET ORAL ONCE
Status: COMPLETED | OUTPATIENT
Start: 2020-10-14 | End: 2020-10-14

## 2020-10-14 RX ORDER — NAPROXEN 500 MG/1
500 TABLET ORAL ONCE
Status: COMPLETED | OUTPATIENT
Start: 2020-10-14 | End: 2020-10-14

## 2020-10-14 RX ORDER — ERYTHROMYCIN 5 MG/G
OINTMENT OPHTHALMIC
Qty: 3.5 G | Refills: 0 | Status: SHIPPED | OUTPATIENT
Start: 2020-10-14 | End: 2020-12-01

## 2020-10-14 RX ORDER — ERYTHROMYCIN 5 MG/G
0.5 OINTMENT OPHTHALMIC ONCE
Status: COMPLETED | OUTPATIENT
Start: 2020-10-14 | End: 2020-10-14

## 2020-10-14 RX ORDER — ONDANSETRON 4 MG/1
4 TABLET, ORALLY DISINTEGRATING ORAL EVERY 6 HOURS PRN
Qty: 20 TABLET | Refills: 0 | Status: SHIPPED | OUTPATIENT
Start: 2020-10-14

## 2020-10-14 RX ADMIN — ERYTHROMYCIN 0.5 INCH: 5 OINTMENT OPHTHALMIC at 17:42

## 2020-10-14 RX ADMIN — NAPROXEN 500 MG: 500 TABLET ORAL at 17:42

## 2020-10-14 RX ADMIN — FLUORESCEIN SODIUM 1 STRIP: 1 STRIP OPHTHALMIC at 17:45

## 2020-10-14 RX ADMIN — ACETAMINOPHEN 975 MG: 325 TABLET, FILM COATED ORAL at 17:42

## 2020-10-16 ENCOUNTER — HOSPITAL ENCOUNTER (EMERGENCY)
Facility: HOSPITAL | Age: 20
Discharge: HOME | End: 2020-10-16
Attending: EMERGENCY MEDICINE | Admitting: EMERGENCY MEDICINE
Payer: COMMERCIAL

## 2020-10-16 VITALS
TEMPERATURE: 98.4 F | WEIGHT: 124 LBS | HEART RATE: 100 BPM | BODY MASS INDEX: 26.83 KG/M2 | DIASTOLIC BLOOD PRESSURE: 59 MMHG | RESPIRATION RATE: 18 BRPM | OXYGEN SATURATION: 100 % | SYSTOLIC BLOOD PRESSURE: 103 MMHG

## 2020-10-16 DIAGNOSIS — R56.9 SEIZURE (HCC): ICD-10-CM

## 2020-10-16 DIAGNOSIS — G40.909 SEIZURE DISORDER (HCC): ICD-10-CM

## 2020-10-16 DIAGNOSIS — R56.9 SEIZURE-LIKE ACTIVITY (HCC): Primary | ICD-10-CM

## 2020-10-16 LAB
ANION GAP SERPL CALCULATED.3IONS-SCNC: 5 MMOL/L (ref 4–13)
ATRIAL RATE: 100 BPM
B-HCG SERPL-ACNC: <2 MIU/ML
BUN SERPL-MCNC: 9 MG/DL (ref 5–25)
CALCIUM SERPL-MCNC: 9 MG/DL (ref 8.3–10.1)
CHLORIDE SERPL-SCNC: 112 MMOL/L (ref 100–108)
CO2 SERPL-SCNC: 26 MMOL/L (ref 21–32)
CREAT SERPL-MCNC: 0.55 MG/DL (ref 0.6–1.3)
GFR SERPL CREATININE-BSD FRML MDRD: 135 ML/MIN/1.73SQ M
GLUCOSE SERPL-MCNC: 93 MG/DL (ref 65–140)
P AXIS: 37 DEGREES
POTASSIUM SERPL-SCNC: 3.8 MMOL/L (ref 3.5–5.3)
PR INTERVAL: 152 MS
QRS AXIS: 81 DEGREES
QRSD INTERVAL: 72 MS
QT INTERVAL: 312 MS
QTC INTERVAL: 402 MS
SODIUM SERPL-SCNC: 143 MMOL/L (ref 136–145)
T WAVE AXIS: 73 DEGREES
VENTRICULAR RATE: 100 BPM

## 2020-10-16 PROCEDURE — 93010 ELECTROCARDIOGRAM REPORT: CPT | Performed by: INTERNAL MEDICINE

## 2020-10-16 PROCEDURE — 99284 EMERGENCY DEPT VISIT MOD MDM: CPT

## 2020-10-16 PROCEDURE — 99284 EMERGENCY DEPT VISIT MOD MDM: CPT | Performed by: EMERGENCY MEDICINE

## 2020-10-16 PROCEDURE — 36415 COLL VENOUS BLD VENIPUNCTURE: CPT | Performed by: EMERGENCY MEDICINE

## 2020-10-16 PROCEDURE — 80048 BASIC METABOLIC PNL TOTAL CA: CPT | Performed by: EMERGENCY MEDICINE

## 2020-10-16 PROCEDURE — 84702 CHORIONIC GONADOTROPIN TEST: CPT | Performed by: EMERGENCY MEDICINE

## 2020-10-16 PROCEDURE — 93005 ELECTROCARDIOGRAM TRACING: CPT

## 2020-10-16 RX ORDER — LEVETIRACETAM 750 MG/1
750 TABLET ORAL 2 TIMES DAILY
Qty: 60 TABLET | Refills: 0 | Status: SHIPPED | OUTPATIENT
Start: 2020-10-16 | End: 2020-10-20 | Stop reason: HOSPADM

## 2020-10-17 ENCOUNTER — APPOINTMENT (EMERGENCY)
Dept: RADIOLOGY | Facility: HOSPITAL | Age: 20
DRG: 053 | End: 2020-10-17
Payer: COMMERCIAL

## 2020-10-17 ENCOUNTER — HOSPITAL ENCOUNTER (INPATIENT)
Facility: HOSPITAL | Age: 20
LOS: 2 days | Discharge: HOME | DRG: 053 | End: 2020-10-20
Attending: EMERGENCY MEDICINE | Admitting: INTERNAL MEDICINE
Payer: COMMERCIAL

## 2020-10-17 DIAGNOSIS — O99.013 ANEMIA DURING PREGNANCY IN THIRD TRIMESTER: ICD-10-CM

## 2020-10-17 DIAGNOSIS — G40.909 RECURRENT SEIZURES (HCC): ICD-10-CM

## 2020-10-17 DIAGNOSIS — Z87.898 HISTORY OF DOMESTIC VIOLENCE: ICD-10-CM

## 2020-10-17 DIAGNOSIS — G40.919 BREAKTHROUGH SEIZURE (HCC): Primary | ICD-10-CM

## 2020-10-17 DIAGNOSIS — R56.9 SEIZURE (HCC): ICD-10-CM

## 2020-10-17 LAB
ALBUMIN SERPL BCP-MCNC: 3.5 G/DL (ref 3.5–5)
ALP SERPL-CCNC: 58 U/L (ref 46–116)
ALT SERPL W P-5'-P-CCNC: 19 U/L (ref 12–78)
ANION GAP SERPL CALCULATED.3IONS-SCNC: 3 MMOL/L (ref 4–13)
AST SERPL W P-5'-P-CCNC: 18 U/L (ref 5–45)
BILIRUB SERPL-MCNC: 0.14 MG/DL (ref 0.2–1)
BUN SERPL-MCNC: 8 MG/DL (ref 5–25)
CALCIUM SERPL-MCNC: 8.8 MG/DL (ref 8.3–10.1)
CHLORIDE SERPL-SCNC: 113 MMOL/L (ref 100–108)
CO2 SERPL-SCNC: 27 MMOL/L (ref 21–32)
CREAT SERPL-MCNC: 0.46 MG/DL (ref 0.6–1.3)
ERYTHROCYTE [DISTWIDTH] IN BLOOD BY AUTOMATED COUNT: 14.1 % (ref 11.6–15.1)
GFR SERPL CREATININE-BSD FRML MDRD: 143 ML/MIN/1.73SQ M
GLUCOSE SERPL-MCNC: 81 MG/DL (ref 65–140)
HCT VFR BLD AUTO: 30.5 % (ref 34.8–46.1)
HGB BLD-MCNC: 9.9 G/DL (ref 11.5–15.4)
LEVETIRACETAM SERPL-MCNC: 34.2 UG/ML (ref 10–40)
MCH RBC QN AUTO: 26.5 PG (ref 26.8–34.3)
MCHC RBC AUTO-ENTMCNC: 32.5 G/DL (ref 31.4–37.4)
MCV RBC AUTO: 82 FL (ref 82–98)
PLATELET # BLD AUTO: 254 THOUSANDS/UL (ref 149–390)
PMV BLD AUTO: 10.2 FL (ref 8.9–12.7)
POTASSIUM SERPL-SCNC: 3.5 MMOL/L (ref 3.5–5.3)
PROT SERPL-MCNC: 7.4 G/DL (ref 6.4–8.2)
RBC # BLD AUTO: 3.74 MILLION/UL (ref 3.81–5.12)
SARS-COV-2 RNA RESP QL NAA+PROBE: NEGATIVE
SODIUM SERPL-SCNC: 143 MMOL/L (ref 136–145)
WBC # BLD AUTO: 5.58 THOUSAND/UL (ref 4.31–10.16)

## 2020-10-17 PROCEDURE — G1004 CDSM NDSC: HCPCS

## 2020-10-17 PROCEDURE — 36415 COLL VENOUS BLD VENIPUNCTURE: CPT | Performed by: EMERGENCY MEDICINE

## 2020-10-17 PROCEDURE — 71045 X-RAY EXAM CHEST 1 VIEW: CPT

## 2020-10-17 PROCEDURE — U0003 INFECTIOUS AGENT DETECTION BY NUCLEIC ACID (DNA OR RNA); SEVERE ACUTE RESPIRATORY SYNDROME CORONAVIRUS 2 (SARS-COV-2) (CORONAVIRUS DISEASE [COVID-19]), AMPLIFIED PROBE TECHNIQUE, MAKING USE OF HIGH THROUGHPUT TECHNOLOGIES AS DESCRIBED BY CMS-2020-01-R: HCPCS | Performed by: EMERGENCY MEDICINE

## 2020-10-17 PROCEDURE — 93005 ELECTROCARDIOGRAM TRACING: CPT

## 2020-10-17 PROCEDURE — 99285 EMERGENCY DEPT VISIT HI MDM: CPT | Performed by: EMERGENCY MEDICINE

## 2020-10-17 PROCEDURE — 80053 COMPREHEN METABOLIC PANEL: CPT | Performed by: EMERGENCY MEDICINE

## 2020-10-17 PROCEDURE — 80177 DRUG SCRN QUAN LEVETIRACETAM: CPT | Performed by: EMERGENCY MEDICINE

## 2020-10-17 PROCEDURE — 70450 CT HEAD/BRAIN W/O DYE: CPT

## 2020-10-17 PROCEDURE — 96365 THER/PROPH/DIAG IV INF INIT: CPT

## 2020-10-17 PROCEDURE — 99285 EMERGENCY DEPT VISIT HI MDM: CPT

## 2020-10-17 PROCEDURE — 83550 IRON BINDING TEST: CPT | Performed by: INTERNAL MEDICINE

## 2020-10-17 PROCEDURE — 96375 TX/PRO/DX INJ NEW DRUG ADDON: CPT

## 2020-10-17 PROCEDURE — 82728 ASSAY OF FERRITIN: CPT | Performed by: INTERNAL MEDICINE

## 2020-10-17 PROCEDURE — 85027 COMPLETE CBC AUTOMATED: CPT | Performed by: EMERGENCY MEDICINE

## 2020-10-17 PROCEDURE — 83540 ASSAY OF IRON: CPT | Performed by: INTERNAL MEDICINE

## 2020-10-17 RX ORDER — LEVETIRACETAM 500 MG/1
1000 TABLET ORAL EVERY 12 HOURS SCHEDULED
Status: DISCONTINUED | OUTPATIENT
Start: 2020-10-18 | End: 2020-10-19

## 2020-10-17 RX ORDER — LORAZEPAM 2 MG/ML
2 INJECTION INTRAMUSCULAR EVERY 6 HOURS PRN
Status: DISCONTINUED | OUTPATIENT
Start: 2020-10-17 | End: 2020-10-20 | Stop reason: HOSPADM

## 2020-10-17 RX ORDER — HYDROXYZINE HYDROCHLORIDE 10 MG/1
10 TABLET, FILM COATED ORAL 2 TIMES DAILY
Status: DISCONTINUED | OUTPATIENT
Start: 2020-10-18 | End: 2020-10-20 | Stop reason: HOSPADM

## 2020-10-17 RX ORDER — FERROUS SULFATE 325(65) MG
325 TABLET ORAL
Status: DISCONTINUED | OUTPATIENT
Start: 2020-10-18 | End: 2020-10-18

## 2020-10-17 RX ORDER — SODIUM CHLORIDE 9 MG/ML
75 INJECTION, SOLUTION INTRAVENOUS CONTINUOUS
Status: DISCONTINUED | OUTPATIENT
Start: 2020-10-17 | End: 2020-10-19

## 2020-10-17 RX ADMIN — LEVETIRACETAM 1500 MG: 100 INJECTION, SOLUTION INTRAVENOUS at 22:33

## 2020-10-17 RX ADMIN — LORAZEPAM 2 MG: 2 INJECTION INTRAMUSCULAR; INTRAVENOUS at 22:20

## 2020-10-18 ENCOUNTER — APPOINTMENT (OUTPATIENT)
Dept: NEUROLOGY | Facility: CLINIC | Age: 20
DRG: 053 | End: 2020-10-18
Payer: COMMERCIAL

## 2020-10-18 LAB
ALBUMIN SERPL BCP-MCNC: 3 G/DL (ref 3.5–5)
ALP SERPL-CCNC: 48 U/L (ref 46–116)
ALT SERPL W P-5'-P-CCNC: 18 U/L (ref 12–78)
AMPHETAMINES SERPL QL SCN: NEGATIVE
ANION GAP SERPL CALCULATED.3IONS-SCNC: 6 MMOL/L (ref 4–13)
AST SERPL W P-5'-P-CCNC: 15 U/L (ref 5–45)
ATRIAL RATE: 78 BPM
BARBITURATES UR QL: NEGATIVE
BASOPHILS # BLD AUTO: 0.01 THOUSANDS/ΜL (ref 0–0.1)
BASOPHILS NFR BLD AUTO: 0 % (ref 0–1)
BENZODIAZ UR QL: NEGATIVE
BILIRUB SERPL-MCNC: 0.32 MG/DL (ref 0.2–1)
BUN SERPL-MCNC: 7 MG/DL (ref 5–25)
CALCIUM ALBUM COR SERPL-MCNC: 8.8 MG/DL (ref 8.3–10.1)
CALCIUM SERPL-MCNC: 8 MG/DL (ref 8.3–10.1)
CHLORIDE SERPL-SCNC: 111 MMOL/L (ref 100–108)
CO2 SERPL-SCNC: 24 MMOL/L (ref 21–32)
COCAINE UR QL: NEGATIVE
CREAT SERPL-MCNC: 0.43 MG/DL (ref 0.6–1.3)
EOSINOPHIL # BLD AUTO: 0.1 THOUSAND/ΜL (ref 0–0.61)
EOSINOPHIL NFR BLD AUTO: 2 % (ref 0–6)
ERYTHROCYTE [DISTWIDTH] IN BLOOD BY AUTOMATED COUNT: 14.1 % (ref 11.6–15.1)
FERRITIN SERPL-MCNC: 18 NG/ML (ref 8–388)
GFR SERPL CREATININE-BSD FRML MDRD: 146 ML/MIN/1.73SQ M
GLUCOSE P FAST SERPL-MCNC: 83 MG/DL (ref 65–99)
GLUCOSE SERPL-MCNC: 83 MG/DL (ref 65–140)
HCT VFR BLD AUTO: 29.2 % (ref 34.8–46.1)
HGB BLD-MCNC: 9.4 G/DL (ref 11.5–15.4)
IMM GRANULOCYTES # BLD AUTO: 0.01 THOUSAND/UL (ref 0–0.2)
IMM GRANULOCYTES NFR BLD AUTO: 0 % (ref 0–2)
IRON SATN MFR SERPL: 8 %
IRON SERPL-MCNC: 25 UG/DL (ref 50–170)
LYMPHOCYTES # BLD AUTO: 2.17 THOUSANDS/ΜL (ref 0.6–4.47)
LYMPHOCYTES NFR BLD AUTO: 47 % (ref 14–44)
MCH RBC QN AUTO: 26.3 PG (ref 26.8–34.3)
MCHC RBC AUTO-ENTMCNC: 32.2 G/DL (ref 31.4–37.4)
MCV RBC AUTO: 82 FL (ref 82–98)
METHADONE UR QL: NEGATIVE
MONOCYTES # BLD AUTO: 0.38 THOUSAND/ΜL (ref 0.17–1.22)
MONOCYTES NFR BLD AUTO: 8 % (ref 4–12)
NEUTROPHILS # BLD AUTO: 2.01 THOUSANDS/ΜL (ref 1.85–7.62)
NEUTS SEG NFR BLD AUTO: 43 % (ref 43–75)
NRBC BLD AUTO-RTO: 0 /100 WBCS
OPIATES UR QL SCN: NEGATIVE
OXYCODONE+OXYMORPHONE UR QL SCN: POSITIVE
P AXIS: 118 DEGREES
PCP UR QL: NEGATIVE
PLATELET # BLD AUTO: 226 THOUSANDS/UL (ref 149–390)
PMV BLD AUTO: 10.2 FL (ref 8.9–12.7)
POTASSIUM SERPL-SCNC: 3.4 MMOL/L (ref 3.5–5.3)
PR INTERVAL: 152 MS
PROT SERPL-MCNC: 6.3 G/DL (ref 6.4–8.2)
QRS AXIS: 80 DEGREES
QRSD INTERVAL: 74 MS
QT INTERVAL: 346 MS
QTC INTERVAL: 394 MS
RBC # BLD AUTO: 3.57 MILLION/UL (ref 3.81–5.12)
SODIUM SERPL-SCNC: 141 MMOL/L (ref 136–145)
T WAVE AXIS: 65 DEGREES
THC UR QL: NEGATIVE
TIBC SERPL-MCNC: 323 UG/DL (ref 250–450)
VENTRICULAR RATE: 78 BPM
WBC # BLD AUTO: 4.68 THOUSAND/UL (ref 4.31–10.16)

## 2020-10-18 PROCEDURE — 80307 DRUG TEST PRSMV CHEM ANLYZR: CPT | Performed by: INTERNAL MEDICINE

## 2020-10-18 PROCEDURE — 95715 VEEG EA 12-26HR INTMT MNTR: CPT

## 2020-10-18 PROCEDURE — 99245 OFF/OP CONSLTJ NEW/EST HI 55: CPT | Performed by: PSYCHIATRY & NEUROLOGY

## 2020-10-18 PROCEDURE — 95700 EEG CONT REC W/VID EEG TECH: CPT

## 2020-10-18 PROCEDURE — 93010 ELECTROCARDIOGRAM REPORT: CPT | Performed by: INTERNAL MEDICINE

## 2020-10-18 PROCEDURE — 80053 COMPREHEN METABOLIC PANEL: CPT | Performed by: INTERNAL MEDICINE

## 2020-10-18 PROCEDURE — 85025 COMPLETE CBC W/AUTO DIFF WBC: CPT | Performed by: INTERNAL MEDICINE

## 2020-10-18 RX ORDER — FERROUS SULFATE 325(65) MG
325 TABLET ORAL EVERY OTHER DAY
Status: DISCONTINUED | OUTPATIENT
Start: 2020-10-18 | End: 2020-10-20 | Stop reason: HOSPADM

## 2020-10-18 RX ORDER — POTASSIUM CHLORIDE 20 MEQ/1
40 TABLET, EXTENDED RELEASE ORAL 2 TIMES DAILY
Status: COMPLETED | OUTPATIENT
Start: 2020-10-18 | End: 2020-10-18

## 2020-10-18 RX ADMIN — SERTRALINE HYDROCHLORIDE 50 MG: 50 TABLET ORAL at 11:38

## 2020-10-18 RX ADMIN — HYDROXYZINE HYDROCHLORIDE 10 MG: 10 TABLET, FILM COATED ORAL at 11:37

## 2020-10-18 RX ADMIN — LEVETIRACETAM 1000 MG: 500 TABLET, FILM COATED ORAL at 11:37

## 2020-10-18 RX ADMIN — LEVETIRACETAM 1000 MG: 500 TABLET, FILM COATED ORAL at 21:20

## 2020-10-18 RX ADMIN — POTASSIUM CHLORIDE 40 MEQ: 1500 TABLET, EXTENDED RELEASE ORAL at 17:16

## 2020-10-18 RX ADMIN — FERROUS SULFATE TAB 325 MG (65 MG ELEMENTAL FE) 325 MG: 325 (65 FE) TAB at 11:38

## 2020-10-18 RX ADMIN — HYDROXYZINE HYDROCHLORIDE 10 MG: 10 TABLET, FILM COATED ORAL at 17:15

## 2020-10-18 RX ADMIN — SODIUM CHLORIDE 75 ML/HR: 0.9 INJECTION, SOLUTION INTRAVENOUS at 13:56

## 2020-10-18 RX ADMIN — SODIUM CHLORIDE 75 ML/HR: 0.9 INJECTION, SOLUTION INTRAVENOUS at 00:31

## 2020-10-18 RX ADMIN — POTASSIUM CHLORIDE 40 MEQ: 1500 TABLET, EXTENDED RELEASE ORAL at 11:37

## 2020-10-19 ENCOUNTER — APPOINTMENT (INPATIENT)
Dept: NEUROLOGY | Facility: CLINIC | Age: 20
DRG: 053 | End: 2020-10-19
Payer: COMMERCIAL

## 2020-10-19 LAB
ANION GAP SERPL CALCULATED.3IONS-SCNC: 2 MMOL/L (ref 4–13)
BUN SERPL-MCNC: 6 MG/DL (ref 5–25)
CALCIUM SERPL-MCNC: 8.7 MG/DL (ref 8.3–10.1)
CHLORIDE SERPL-SCNC: 116 MMOL/L (ref 100–108)
CO2 SERPL-SCNC: 26 MMOL/L (ref 21–32)
CREAT SERPL-MCNC: 0.54 MG/DL (ref 0.6–1.3)
GFR SERPL CREATININE-BSD FRML MDRD: 135 ML/MIN/1.73SQ M
GLUCOSE SERPL-MCNC: 78 MG/DL (ref 65–140)
POTASSIUM SERPL-SCNC: 3.6 MMOL/L (ref 3.5–5.3)
SODIUM SERPL-SCNC: 144 MMOL/L (ref 136–145)

## 2020-10-19 PROCEDURE — 80048 BASIC METABOLIC PNL TOTAL CA: CPT | Performed by: INTERNAL MEDICINE

## 2020-10-19 PROCEDURE — 99232 SBSQ HOSP IP/OBS MODERATE 35: CPT | Performed by: PSYCHIATRY & NEUROLOGY

## 2020-10-19 PROCEDURE — 95715 VEEG EA 12-26HR INTMT MNTR: CPT

## 2020-10-19 PROCEDURE — 95720 EEG PHY/QHP EA INCR W/VEEG: CPT | Performed by: PSYCHIATRY & NEUROLOGY

## 2020-10-19 RX ORDER — LEVETIRACETAM 500 MG/1
1000 TABLET ORAL EVERY 12 HOURS SCHEDULED
Status: DISCONTINUED | OUTPATIENT
Start: 2020-10-20 | End: 2020-10-20 | Stop reason: HOSPADM

## 2020-10-19 RX ADMIN — SERTRALINE HYDROCHLORIDE 50 MG: 50 TABLET ORAL at 08:00

## 2020-10-19 RX ADMIN — LEVETIRACETAM 1000 MG: 500 TABLET, FILM COATED ORAL at 08:00

## 2020-10-19 RX ADMIN — SODIUM CHLORIDE 75 ML/HR: 0.9 INJECTION, SOLUTION INTRAVENOUS at 03:35

## 2020-10-19 RX ADMIN — LEVETIRACETAM 2000 MG: 100 INJECTION, SOLUTION INTRAVENOUS at 23:58

## 2020-10-19 RX ADMIN — HYDROXYZINE HYDROCHLORIDE 10 MG: 10 TABLET, FILM COATED ORAL at 09:07

## 2020-10-19 RX ADMIN — HYDROXYZINE HYDROCHLORIDE 10 MG: 10 TABLET, FILM COATED ORAL at 17:12

## 2020-10-20 VITALS
HEIGHT: 57 IN | WEIGHT: 112.43 LBS | RESPIRATION RATE: 16 BRPM | DIASTOLIC BLOOD PRESSURE: 58 MMHG | BODY MASS INDEX: 24.26 KG/M2 | HEART RATE: 84 BPM | SYSTOLIC BLOOD PRESSURE: 91 MMHG | TEMPERATURE: 98.7 F | OXYGEN SATURATION: 99 %

## 2020-10-20 LAB
ANION GAP SERPL CALCULATED.3IONS-SCNC: 5 MMOL/L (ref 4–13)
BUN SERPL-MCNC: 9 MG/DL (ref 5–25)
CALCIUM SERPL-MCNC: 9 MG/DL (ref 8.3–10.1)
CHLORIDE SERPL-SCNC: 112 MMOL/L (ref 100–108)
CO2 SERPL-SCNC: 26 MMOL/L (ref 21–32)
CREAT SERPL-MCNC: 0.54 MG/DL (ref 0.6–1.3)
GFR SERPL CREATININE-BSD FRML MDRD: 135 ML/MIN/1.73SQ M
GLUCOSE SERPL-MCNC: 88 MG/DL (ref 65–140)
HCT VFR BLD AUTO: 34.1 % (ref 34.8–46.1)
HGB BLD-MCNC: 10.9 G/DL (ref 11.5–15.4)
POTASSIUM SERPL-SCNC: 3.8 MMOL/L (ref 3.5–5.3)
SODIUM SERPL-SCNC: 143 MMOL/L (ref 136–145)

## 2020-10-20 PROCEDURE — 99232 SBSQ HOSP IP/OBS MODERATE 35: CPT | Performed by: PSYCHIATRY & NEUROLOGY

## 2020-10-20 PROCEDURE — 80048 BASIC METABOLIC PNL TOTAL CA: CPT | Performed by: INTERNAL MEDICINE

## 2020-10-20 PROCEDURE — 85014 HEMATOCRIT: CPT | Performed by: INTERNAL MEDICINE

## 2020-10-20 PROCEDURE — 95720 EEG PHY/QHP EA INCR W/VEEG: CPT | Performed by: PSYCHIATRY & NEUROLOGY

## 2020-10-20 PROCEDURE — 85018 HEMOGLOBIN: CPT | Performed by: INTERNAL MEDICINE

## 2020-10-20 RX ORDER — MAGNESIUM SULFATE 1 G/100ML
1 INJECTION INTRAVENOUS ONCE
Status: DISCONTINUED | OUTPATIENT
Start: 2020-10-20 | End: 2020-10-20 | Stop reason: HOSPADM

## 2020-10-20 RX ORDER — LEVETIRACETAM 1000 MG/1
1000 TABLET ORAL EVERY 12 HOURS SCHEDULED
Qty: 90 TABLET | Refills: 1 | Status: SHIPPED | OUTPATIENT
Start: 2020-10-20 | End: 2020-12-24 | Stop reason: SDUPTHER

## 2020-10-20 RX ORDER — FERROUS SULFATE TAB EC 324 MG (65 MG FE EQUIVALENT) 324 (65 FE) MG
324 TABLET DELAYED RESPONSE ORAL EVERY OTHER DAY
Qty: 15 TABLET | Refills: 2 | Status: SHIPPED | OUTPATIENT
Start: 2020-10-20 | End: 2020-12-01

## 2020-10-20 RX ORDER — ACETAMINOPHEN 325 MG/1
975 TABLET ORAL EVERY 8 HOURS PRN
Status: DISCONTINUED | OUTPATIENT
Start: 2020-10-20 | End: 2020-10-20 | Stop reason: HOSPADM

## 2020-10-20 RX ADMIN — HYDROXYZINE HYDROCHLORIDE 10 MG: 10 TABLET, FILM COATED ORAL at 08:52

## 2020-10-20 RX ADMIN — ACETAMINOPHEN 975 MG: 325 TABLET, FILM COATED ORAL at 09:53

## 2020-10-20 RX ADMIN — SERTRALINE HYDROCHLORIDE 50 MG: 50 TABLET ORAL at 08:52

## 2020-10-20 RX ADMIN — FERROUS SULFATE TAB 325 MG (65 MG ELEMENTAL FE) 325 MG: 325 (65 FE) TAB at 08:52

## 2020-10-20 RX ADMIN — LEVETIRACETAM 1000 MG: 500 TABLET, FILM COATED ORAL at 08:52

## 2020-10-22 LAB — LEVETIRACETAM SERPL-MCNC: 21.9 UG/ML (ref 10–40)

## 2020-10-23 ENCOUNTER — HOSPITAL ENCOUNTER (EMERGENCY)
Facility: HOSPITAL | Age: 20
Discharge: HOME | End: 2020-10-23
Attending: EMERGENCY MEDICINE | Admitting: EMERGENCY MEDICINE
Payer: COMMERCIAL

## 2020-10-23 ENCOUNTER — TELEPHONE (OUTPATIENT)
Dept: NEUROLOGY | Facility: CLINIC | Age: 20
End: 2020-10-23

## 2020-10-23 VITALS
OXYGEN SATURATION: 100 % | SYSTOLIC BLOOD PRESSURE: 91 MMHG | RESPIRATION RATE: 18 BRPM | BODY MASS INDEX: 27.05 KG/M2 | WEIGHT: 125 LBS | HEART RATE: 97 BPM | TEMPERATURE: 98.5 F | DIASTOLIC BLOOD PRESSURE: 52 MMHG

## 2020-10-23 DIAGNOSIS — R56.9 SEIZURE-LIKE ACTIVITY (HCC): Primary | ICD-10-CM

## 2020-10-23 LAB
ANION GAP SERPL CALCULATED.3IONS-SCNC: 5 MMOL/L (ref 4–13)
ATRIAL RATE: 117 BPM
B-HCG SERPL-ACNC: 7 MIU/ML
BUN SERPL-MCNC: 8 MG/DL (ref 5–25)
CALCIUM SERPL-MCNC: 9.2 MG/DL (ref 8.3–10.1)
CHLORIDE SERPL-SCNC: 111 MMOL/L (ref 100–108)
CO2 SERPL-SCNC: 26 MMOL/L (ref 21–32)
CREAT SERPL-MCNC: 0.58 MG/DL (ref 0.6–1.3)
EXT PREG TEST URINE: NORMAL
EXT. CONTROL ED NAV: NORMAL
GFR SERPL CREATININE-BSD FRML MDRD: 132 ML/MIN/1.73SQ M
GLUCOSE SERPL-MCNC: 92 MG/DL (ref 65–140)
P AXIS: 42 DEGREES
POTASSIUM SERPL-SCNC: 3.5 MMOL/L (ref 3.5–5.3)
PR INTERVAL: 134 MS
QRS AXIS: 83 DEGREES
QRSD INTERVAL: 80 MS
QT INTERVAL: 322 MS
QTC INTERVAL: 445 MS
SODIUM SERPL-SCNC: 142 MMOL/L (ref 136–145)
T WAVE AXIS: 32 DEGREES
VENTRICULAR RATE: 115 BPM

## 2020-10-23 PROCEDURE — 93010 ELECTROCARDIOGRAM REPORT: CPT | Performed by: INTERNAL MEDICINE

## 2020-10-23 PROCEDURE — 99284 EMERGENCY DEPT VISIT MOD MDM: CPT

## 2020-10-23 PROCEDURE — 93005 ELECTROCARDIOGRAM TRACING: CPT

## 2020-10-23 PROCEDURE — 81025 URINE PREGNANCY TEST: CPT | Performed by: EMERGENCY MEDICINE

## 2020-10-23 PROCEDURE — 36415 COLL VENOUS BLD VENIPUNCTURE: CPT | Performed by: EMERGENCY MEDICINE

## 2020-10-23 PROCEDURE — 80048 BASIC METABOLIC PNL TOTAL CA: CPT | Performed by: EMERGENCY MEDICINE

## 2020-10-23 PROCEDURE — 99285 EMERGENCY DEPT VISIT HI MDM: CPT | Performed by: EMERGENCY MEDICINE

## 2020-10-23 PROCEDURE — 84702 CHORIONIC GONADOTROPIN TEST: CPT | Performed by: EMERGENCY MEDICINE

## 2020-10-23 RX ORDER — DIAZEPAM 5 MG/1
5 TABLET ORAL ONCE
Status: COMPLETED | OUTPATIENT
Start: 2020-10-23 | End: 2020-10-23

## 2020-10-23 RX ADMIN — DIAZEPAM 5 MG: 5 TABLET ORAL at 17:04

## 2020-11-03 ENCOUNTER — HOSPITAL ENCOUNTER (EMERGENCY)
Facility: HOSPITAL | Age: 20
Discharge: HOME | End: 2020-11-03
Attending: EMERGENCY MEDICINE | Admitting: EMERGENCY MEDICINE
Payer: COMMERCIAL

## 2020-11-03 VITALS
DIASTOLIC BLOOD PRESSURE: 71 MMHG | HEART RATE: 90 BPM | SYSTOLIC BLOOD PRESSURE: 102 MMHG | RESPIRATION RATE: 16 BRPM | OXYGEN SATURATION: 99 % | TEMPERATURE: 98.2 F

## 2020-11-03 DIAGNOSIS — S01.512A TONGUE LACERATION, INITIAL ENCOUNTER: ICD-10-CM

## 2020-11-03 DIAGNOSIS — E87.6 HYPOKALEMIA: ICD-10-CM

## 2020-11-03 DIAGNOSIS — G40.919 BREAKTHROUGH SEIZURE (HCC): Primary | ICD-10-CM

## 2020-11-03 LAB
ANION GAP SERPL CALCULATED.3IONS-SCNC: 4 MMOL/L (ref 4–13)
B-HCG SERPL-ACNC: <2 MIU/ML
BUN SERPL-MCNC: 7 MG/DL (ref 5–25)
CALCIUM SERPL-MCNC: 7.2 MG/DL (ref 8.3–10.1)
CHLORIDE SERPL-SCNC: 117 MMOL/L (ref 100–108)
CO2 SERPL-SCNC: 23 MMOL/L (ref 21–32)
CREAT SERPL-MCNC: 0.36 MG/DL (ref 0.6–1.3)
GFR SERPL CREATININE-BSD FRML MDRD: 155 ML/MIN/1.73SQ M
GLUCOSE SERPL-MCNC: 73 MG/DL (ref 65–140)
POTASSIUM SERPL-SCNC: 2.9 MMOL/L (ref 3.5–5.3)
SODIUM SERPL-SCNC: 144 MMOL/L (ref 136–145)

## 2020-11-03 PROCEDURE — 80048 BASIC METABOLIC PNL TOTAL CA: CPT | Performed by: EMERGENCY MEDICINE

## 2020-11-03 PROCEDURE — 99285 EMERGENCY DEPT VISIT HI MDM: CPT | Performed by: EMERGENCY MEDICINE

## 2020-11-03 PROCEDURE — 99284 EMERGENCY DEPT VISIT MOD MDM: CPT

## 2020-11-03 PROCEDURE — 84702 CHORIONIC GONADOTROPIN TEST: CPT | Performed by: EMERGENCY MEDICINE

## 2020-11-03 PROCEDURE — 36415 COLL VENOUS BLD VENIPUNCTURE: CPT | Performed by: EMERGENCY MEDICINE

## 2020-11-03 RX ORDER — POTASSIUM CHLORIDE 20 MEQ/1
40 TABLET, EXTENDED RELEASE ORAL ONCE
Status: COMPLETED | OUTPATIENT
Start: 2020-11-03 | End: 2020-11-03

## 2020-11-03 RX ORDER — CHLORHEXIDINE GLUCONATE 0.12 MG/ML
15 RINSE ORAL 2 TIMES DAILY
Qty: 210 ML | Refills: 0 | Status: SHIPPED | OUTPATIENT
Start: 2020-11-03 | End: 2020-11-10

## 2020-11-03 RX ADMIN — POTASSIUM CHLORIDE 40 MEQ: 1500 TABLET, EXTENDED RELEASE ORAL at 09:17

## 2020-11-09 NOTE — PROGRESS NOTES
Admission Medication History Completed by Pharmacy    See [facility] Admission Navigator for allergy information, preferred outpatient pharmacy, prior to admission medications and immunization status.     Medication History Sources:     Patient    Care Everywhere [facility]    Changes made to [facility] medication list (reason):    Added: none    Deleted:   o Lyrica 150 mg capsule - Take 150 mg by mouth 3 times daily (per patient)    Changed:   o Cyclobenzaprine 5 mg tablet --> Cyclobenzaprine 10 mg tablet- Take 5 mg by mouth 3 times daily as needed for muscle spasms (per patient)    Additional Information:    Patient knew all of her medication's names, dosages, and frequencies. She also knew the last time she took each medication.     Patient reported that she took her alendronate this morning but threw up shortly after so she is not sure if the dose stayed down.     Patient uses an insulin pump with 100 unit/mL Novolog. She refills the pump every 3 days. She also has a Novolog pen if needed for correction but she has not had to use that.     The patient denied the use of any other prescription or over the counter medications.     Prior to Admission medications    Medication Sig Last Dose Taking? Auth Provider   acetaminophen 500 MG CAPS Take 1,000 mg by mouth three times a day as needed. 11/8/2020 Yes Reported, Patient   alendronate (FOSAMAX) 70 MG tablet Take 1 tablet (70 mg) by mouth every 7 days On Sundays take first thing in the morning with plain water and remain upright for at least 30 minutes and until after first food of the day    Do not restart Fosamax (alendronate) until your difficulty swallowing has resolved and you have finished the entire course of fluconazole (Diflucan). 11/8/2020 Yes Kendall Owen MD   alum & mag hydroxide-simethicone (MYLANTA/MAALOX) 200-200-25 MG CHEW chewable tablet Take 1 tablet by mouth 3 times daily as needed for indigestion 11/5/2020 Yes Unknown, Entered By History  The patient was seen today for an ultrasound  Please see ultrasound report (located under Ob Procedures) for additional details  Thank you very much for allowing us to participate in the care of this very nice patient  Should you have any questions, please do not hesitate to contact me  MD Stewart Madsenh  Attending Physician, Sada   amylase-lipase-protease (CREON 12) 54083 units CPEP Take 6 to 8 capsules by mouth with meals and take 4 capsules with snacks. Needs up to 25 capsules per day 11/7/2020 at PM Yes Amena Maher MD   aspirin 81 MG tablet Take 81 mg by mouth daily. 11/8/2020 at AM Yes Reported, Patient   cyclobenzaprine (FLEXERIL) 10 MG tablet Take 10 mg by mouth 3 times daily as needed for muscle spasms 11/7/2020 at PM Yes Unknown, Entered By History   diclofenac (FLECTOR) 1.3 % Patch Place 1 patch onto the skin 2 times daily 11/4/2020 Yes Leonard Millard MD   diclofenac (VOLTAREN) 1 % GEL 2 g Apply 2 g to skin four times a day as needed (to affected upper extremity joint(s)). Maximum 8g/day per joint, 16g/day total. 11/7/2020 at PM Yes Reported, Patient   dicyclomine (BENTYL) 10 MG capsule Take 10 mg by mouth every 6 hours 11/7/2020 at PM Yes Unknown, Entered By History   dronabinol (MARINOL) 2.5 MG capsule Take 2 capsules (5 mg) by mouth 2 times daily as needed 11/8/2020 at AM Yes Ron Morgan MD   DULoxetine (CYMBALTA) 30 MG capsule Take 1 capsule (30 mg) by mouth daily With 60mg capsule for total dose of 90mg 11/8/2020 at AM Yes Ron Morgan MD   DULoxetine (CYMBALTA) 60 MG EC capsule Take 1 capsule (60 mg) by mouth daily With 30mg capsule for total dose of 90mg 11/8/2020 at AM Yes Ron Morgan MD   hydrocortisone (CORTEF) 10 MG tablet Take 10 mg in the morning and 10 mg along with a 5 mg tablet at bedtime for a total dose of 15 mg at bedtime. Watch for hypoglycemia recurrence. 11/8/2020 at AM Yes Unknown, Entered By History   hydrocortisone (CORTEF) 5 MG tablet Take 5 mg by mouth At Bedtime along with a 10 mg tablet for a total dose of 15 mg 11/7/2020 at PM Yes Unknown, Entered By History   Injection Device for insulin (NOVOPEN ECHO) RICARDO Use with   Insulin 11/8/2020 Yes Amena Maher MD   insulin aspart (NOVOLOG VIAL) 100 UNITS/ML vial Use to fill insulin pump reservoir, needs  150 units every 3 days.  Yes Amena Maher MD   insulin aspart (NOVOPEN ECHO) 100 UNIT/ML cartridge Correction coverage: Novolog Insulin subcutaneous before meals and HS. See pharmacy instructions  Yes Tonie May MD   levothyroxine (SYNTHROID/LEVOTHROID) 112 MCG tablet Take 1 tablet (112 mcg) by mouth daily 11/8/2020 at AM Yes Ron Morgan MD   linaclotide (LINZESS) 145 MCG capsule Take 145 mcg by mouth every morning (before breakfast) as needed 11/6/2020 at AM Yes Unknown, Entered By History   metoclopramide (REGLAN) 5 MG tablet Take 2 tablets (10 mg) by mouth 3 times daily 11/8/2020 at AM Yes Ron Morgan MD   Nutritional Supplements (BOOST HIGH PROTEIN) LIQD After above baseline labs are drawn, give: 6 mL/kg to maximum of 360 mL; the beverage is to be consumed within 5 minutes.  Patient taking differently: as needed After above baseline labs are drawn, give: 6 mL/kg to maximum of 360 mL; the beverage is to be consumed within 5 minutes. 11/3/2020 Yes Amena Maher MD   ondansetron (ZOFRAN-ODT) 4 MG ODT tab DISSOLVE ONE TABLET ON THE TONGUE EVERY 6 HOURS AS NEEDED FOR NAUSEA AND VOMITING 11/7/2020 at PM Yes Ron Morgan MD   pantoprazole (PROTONIX) 40 MG EC tablet Take 1 tablet (40 mg) by mouth 2 times daily 11/7/2020 at PM Yes Ron Morgan MD   polyethylene glycol (MIRALAX/GLYCOLAX) packet Take 1 packet by mouth 2 times daily as needed for constipation  11/6/2020 Yes Rosalee Dee MD   potassium chloride SA (K-DUR/KLOR-CON M) 20 MEQ CR tablet Take 1 tablet (20 mEq) by mouth daily 11/8/2020 at AM Yes Ron Morgan MD   senna-docusate (SENOKOT-S/PERICOLACE) 8.6-50 MG tablet Take 1-2 tablets by mouth daily as needed for constipation 11/6/2020 Yes Ron Morgan MD   sodium chloride (OCEAN) 0.65 % nasal spray Spray 1 spray into both nostrils daily as needed for congestion Past Week Yes Delilah Orosco  BEE Haider CNP   sucralfate (CARAFATE) 1 GM tablet Take 1 tablet (1 g) by mouth 4 times daily (before meals and nightly) 11/7/2020 at PM Yes Tonie May MD   SUMAtriptan (IMITREX) 50 MG tablet Take 1 tablet (50 mg) by mouth at onset of headache for migraine Take 1 Tab by mouth Once as needed for Migraine Headache. May repeat after two hours.  Maximum dose 200 mg/24 hours. 11/8/2020 at AM Yes Ron Morgan MD   topiramate (TOPAMAX) 100 MG tablet Take 1 tablet (100 mg) by mouth 2 times daily 11/8/2020 at AM Yes Ron Morgan MD   traZODone (DESYREL) 100 MG tablet TAKE 1-2 TABLETS BY MOUTH AN HOUR BEFORE BEDTIME 11/7/2020 at PM Yes Ron Morgan MD   blood glucose (NO BRAND SPECIFIED) test strip EZ Next Contour strip.  Use to test blood sugar 6 times daily or as directed.   Amena Maher MD   blood glucose (NO BRAND SPECIFIED) test strip Use to test blood sugar 8 times daily or as directed.   Amena Maher MD   order for DME by [address] [place], [place]  Ph: [phone]  Fax: [phone]    Nutren 1.5 @ 10 ml/hr with advancement by 10 ml/hr q 8 hours to goal rate of 40 ml/hr.  This will provide 1440 kcals (27 kcal/kg/day), 65 g PRO (1.2 g/kg/day), 730 mL H2O, 169 g CHO and no Fiber daily.     Patient not taking: Reported on 11/21/2019   Leonard Millard MD         Date completed: 11/08/20    Medication history completed by: Daniel Agosto

## 2020-11-10 ENCOUNTER — TELEPHONE (OUTPATIENT)
Dept: NEUROLOGY | Facility: CLINIC | Age: 20
End: 2020-11-10

## 2020-11-13 ENCOUNTER — HOSPITAL ENCOUNTER (EMERGENCY)
Facility: HOSPITAL | Age: 20
Discharge: HOME | End: 2020-11-13
Attending: EMERGENCY MEDICINE
Payer: COMMERCIAL

## 2020-11-13 VITALS
TEMPERATURE: 98.8 F | OXYGEN SATURATION: 100 % | RESPIRATION RATE: 16 BRPM | SYSTOLIC BLOOD PRESSURE: 150 MMHG | DIASTOLIC BLOOD PRESSURE: 59 MMHG | HEART RATE: 99 BPM

## 2020-11-13 DIAGNOSIS — R56.9 SEIZURE (HCC): Primary | ICD-10-CM

## 2020-11-13 LAB
ANION GAP SERPL CALCULATED.3IONS-SCNC: 5 MMOL/L (ref 4–13)
BUN SERPL-MCNC: 15 MG/DL (ref 5–25)
CALCIUM SERPL-MCNC: 8.8 MG/DL (ref 8.3–10.1)
CHLORIDE SERPL-SCNC: 113 MMOL/L (ref 100–108)
CO2 SERPL-SCNC: 25 MMOL/L (ref 21–32)
CREAT SERPL-MCNC: 0.72 MG/DL (ref 0.6–1.3)
GFR SERPL CREATININE-BSD FRML MDRD: 120 ML/MIN/1.73SQ M
GLUCOSE SERPL-MCNC: 79 MG/DL (ref 65–140)
HCG SERPL QL: NEGATIVE
POTASSIUM SERPL-SCNC: 3.9 MMOL/L (ref 3.5–5.3)
SODIUM SERPL-SCNC: 143 MMOL/L (ref 136–145)

## 2020-11-13 PROCEDURE — 99284 EMERGENCY DEPT VISIT MOD MDM: CPT | Performed by: EMERGENCY MEDICINE

## 2020-11-13 PROCEDURE — 99284 EMERGENCY DEPT VISIT MOD MDM: CPT

## 2020-11-13 PROCEDURE — 80048 BASIC METABOLIC PNL TOTAL CA: CPT | Performed by: EMERGENCY MEDICINE

## 2020-11-13 PROCEDURE — 36415 COLL VENOUS BLD VENIPUNCTURE: CPT | Performed by: EMERGENCY MEDICINE

## 2020-11-13 PROCEDURE — 84703 CHORIONIC GONADOTROPIN ASSAY: CPT | Performed by: EMERGENCY MEDICINE

## 2020-11-16 ENCOUNTER — TELEPHONE (OUTPATIENT)
Dept: NEUROLOGY | Facility: CLINIC | Age: 20
End: 2020-11-16

## 2020-12-01 ENCOUNTER — HOSPITAL ENCOUNTER (EMERGENCY)
Facility: HOSPITAL | Age: 20
Discharge: HOME | End: 2020-12-01
Attending: EMERGENCY MEDICINE | Admitting: EMERGENCY MEDICINE

## 2020-12-01 VITALS
SYSTOLIC BLOOD PRESSURE: 95 MMHG | DIASTOLIC BLOOD PRESSURE: 57 MMHG | BODY MASS INDEX: 26.54 KG/M2 | WEIGHT: 123 LBS | TEMPERATURE: 98 F | RESPIRATION RATE: 19 BRPM | HEIGHT: 57 IN | OXYGEN SATURATION: 96 % | HEART RATE: 90 BPM

## 2020-12-01 DIAGNOSIS — G40.919 BREAKTHROUGH SEIZURE (HCC): Primary | ICD-10-CM

## 2020-12-01 DIAGNOSIS — Z32.01 POSITIVE PREGNANCY TEST: ICD-10-CM

## 2020-12-01 LAB
ALBUMIN SERPL BCP-MCNC: 3.7 G/DL (ref 3.5–5)
ALP SERPL-CCNC: 59 U/L (ref 46–116)
ALT SERPL W P-5'-P-CCNC: 16 U/L (ref 12–78)
ANION GAP SERPL CALCULATED.3IONS-SCNC: 4 MMOL/L (ref 4–13)
AST SERPL W P-5'-P-CCNC: 12 U/L (ref 5–45)
BASOPHILS # BLD AUTO: 0 THOUSANDS/ΜL (ref 0–0.1)
BASOPHILS NFR BLD AUTO: 0 % (ref 0–1)
BILIRUB SERPL-MCNC: 0.19 MG/DL (ref 0.2–1)
BILIRUB UR QL STRIP: NEGATIVE
BUN SERPL-MCNC: 8 MG/DL (ref 5–25)
CALCIUM SERPL-MCNC: 9.4 MG/DL (ref 8.3–10.1)
CHLORIDE SERPL-SCNC: 109 MMOL/L (ref 100–108)
CLARITY UR: ABNORMAL
CO2 SERPL-SCNC: 25 MMOL/L (ref 21–32)
COLOR UR: YELLOW
CREAT SERPL-MCNC: 0.46 MG/DL (ref 0.6–1.3)
EOSINOPHIL # BLD AUTO: 0.07 THOUSAND/ΜL (ref 0–0.61)
EOSINOPHIL NFR BLD AUTO: 1 % (ref 0–6)
ERYTHROCYTE [DISTWIDTH] IN BLOOD BY AUTOMATED COUNT: 13.9 % (ref 11.6–15.1)
EXT PREG TEST URINE: POSITIVE
EXT. CONTROL ED NAV: NORMAL
GFR SERPL CREATININE-BSD FRML MDRD: 143 ML/MIN/1.73SQ M
GLUCOSE SERPL-MCNC: 84 MG/DL (ref 65–140)
GLUCOSE UR STRIP-MCNC: NEGATIVE MG/DL
HCT VFR BLD AUTO: 33.3 % (ref 34.8–46.1)
HGB BLD-MCNC: 10.6 G/DL (ref 11.5–15.4)
HGB UR QL STRIP.AUTO: NEGATIVE
IMM GRANULOCYTES # BLD AUTO: 0.02 THOUSAND/UL (ref 0–0.2)
IMM GRANULOCYTES NFR BLD AUTO: 0 % (ref 0–2)
KETONES UR STRIP-MCNC: NEGATIVE MG/DL
LEUKOCYTE ESTERASE UR QL STRIP: NEGATIVE
LYMPHOCYTES # BLD AUTO: 1.88 THOUSANDS/ΜL (ref 0.6–4.47)
LYMPHOCYTES NFR BLD AUTO: 31 % (ref 14–44)
MCH RBC QN AUTO: 26.2 PG (ref 26.8–34.3)
MCHC RBC AUTO-ENTMCNC: 31.8 G/DL (ref 31.4–37.4)
MCV RBC AUTO: 82 FL (ref 82–98)
MONOCYTES # BLD AUTO: 0.41 THOUSAND/ΜL (ref 0.17–1.22)
MONOCYTES NFR BLD AUTO: 7 % (ref 4–12)
NEUTROPHILS # BLD AUTO: 3.6 THOUSANDS/ΜL (ref 1.85–7.62)
NEUTS SEG NFR BLD AUTO: 61 % (ref 43–75)
NITRITE UR QL STRIP: NEGATIVE
NRBC BLD AUTO-RTO: 0 /100 WBCS
PH UR STRIP.AUTO: 8.5 [PH] (ref 4.5–8)
PLATELET # BLD AUTO: 229 THOUSANDS/UL (ref 149–390)
PMV BLD AUTO: 10.2 FL (ref 8.9–12.7)
POTASSIUM SERPL-SCNC: 3.6 MMOL/L (ref 3.5–5.3)
PROT SERPL-MCNC: 8.1 G/DL (ref 6.4–8.2)
PROT UR STRIP-MCNC: NEGATIVE MG/DL
RBC # BLD AUTO: 4.04 MILLION/UL (ref 3.81–5.12)
SODIUM SERPL-SCNC: 138 MMOL/L (ref 136–145)
SP GR UR STRIP.AUTO: 1.02 (ref 1–1.03)
UROBILINOGEN UR QL STRIP.AUTO: 4 E.U./DL
WBC # BLD AUTO: 5.98 THOUSAND/UL (ref 4.31–10.16)

## 2020-12-01 PROCEDURE — 80053 COMPREHEN METABOLIC PANEL: CPT | Performed by: EMERGENCY MEDICINE

## 2020-12-01 PROCEDURE — 99284 EMERGENCY DEPT VISIT MOD MDM: CPT

## 2020-12-01 PROCEDURE — 36415 COLL VENOUS BLD VENIPUNCTURE: CPT | Performed by: EMERGENCY MEDICINE

## 2020-12-01 PROCEDURE — 87086 URINE CULTURE/COLONY COUNT: CPT

## 2020-12-01 PROCEDURE — 93005 ELECTROCARDIOGRAM TRACING: CPT

## 2020-12-01 PROCEDURE — 85025 COMPLETE CBC W/AUTO DIFF WBC: CPT | Performed by: EMERGENCY MEDICINE

## 2020-12-01 PROCEDURE — 81025 URINE PREGNANCY TEST: CPT | Performed by: EMERGENCY MEDICINE

## 2020-12-01 PROCEDURE — 99285 EMERGENCY DEPT VISIT HI MDM: CPT | Performed by: EMERGENCY MEDICINE

## 2020-12-01 PROCEDURE — 81003 URINALYSIS AUTO W/O SCOPE: CPT

## 2020-12-02 LAB
ATRIAL RATE: 79 BPM
BACTERIA UR CULT: NORMAL
P AXIS: 217 DEGREES
PR INTERVAL: 144 MS
QRS AXIS: 76 DEGREES
QRSD INTERVAL: 72 MS
QT INTERVAL: 348 MS
QTC INTERVAL: 399 MS
T WAVE AXIS: 68 DEGREES
VENTRICULAR RATE: 79 BPM

## 2020-12-02 PROCEDURE — 93010 ELECTROCARDIOGRAM REPORT: CPT | Performed by: INTERNAL MEDICINE

## 2020-12-09 ENCOUNTER — TELEPHONE (OUTPATIENT)
Dept: OBGYN CLINIC | Facility: CLINIC | Age: 20
End: 2020-12-09

## 2020-12-10 ENCOUNTER — ULTRASOUND (OUTPATIENT)
Dept: OBGYN CLINIC | Facility: CLINIC | Age: 20
End: 2020-12-10

## 2020-12-10 VITALS
HEART RATE: 96 BPM | WEIGHT: 97 LBS | SYSTOLIC BLOOD PRESSURE: 102 MMHG | BODY MASS INDEX: 20.93 KG/M2 | HEIGHT: 57 IN | DIASTOLIC BLOOD PRESSURE: 66 MMHG

## 2020-12-10 DIAGNOSIS — N92.6 MISSED MENSES: Primary | ICD-10-CM

## 2020-12-10 PROBLEM — O98.811 CHLAMYDIA INFECTION AFFECTING PREGNANCY IN FIRST TRIMESTER: Status: RESOLVED | Noted: 2019-07-02 | Resolved: 2020-12-10

## 2020-12-10 PROBLEM — F41.9 ANXIETY: Status: RESOLVED | Noted: 2019-09-09 | Resolved: 2020-12-10

## 2020-12-10 PROBLEM — A74.9 CHLAMYDIA INFECTION AFFECTING PREGNANCY IN FIRST TRIMESTER: Status: RESOLVED | Noted: 2019-07-02 | Resolved: 2020-12-10

## 2020-12-10 PROBLEM — Z3A.37 37 WEEKS GESTATION OF PREGNANCY: Status: RESOLVED | Noted: 2019-06-25 | Resolved: 2020-12-10

## 2020-12-10 PROBLEM — O21.9 NAUSEA AND VOMITING DURING PREGNANCY: Status: ACTIVE | Noted: 2020-12-10

## 2020-12-10 PROBLEM — O99.343 ANXIETY IN PREGNANCY IN THIRD TRIMESTER, ANTEPARTUM: Status: RESOLVED | Noted: 2019-08-20 | Resolved: 2020-12-10

## 2020-12-10 PROBLEM — F41.9 ANXIETY IN PREGNANCY IN THIRD TRIMESTER, ANTEPARTUM: Status: RESOLVED | Noted: 2019-08-20 | Resolved: 2020-12-10

## 2020-12-10 PROBLEM — Z3A.39 39 WEEKS GESTATION OF PREGNANCY: Chronic | Status: RESOLVED | Noted: 2020-01-19 | Resolved: 2020-12-10

## 2020-12-10 PROBLEM — O99.013 ANEMIA DURING PREGNANCY IN THIRD TRIMESTER: Status: RESOLVED | Noted: 2019-11-22 | Resolved: 2020-12-10

## 2020-12-10 PROBLEM — Z3A.01 LESS THAN 8 WEEKS GESTATION OF PREGNANCY: Status: ACTIVE | Noted: 2019-12-06

## 2020-12-10 LAB — SL AMB POCT URINE HCG: POSITIVE

## 2020-12-10 PROCEDURE — 99213 OFFICE O/P EST LOW 20 MIN: CPT | Performed by: OBSTETRICS & GYNECOLOGY

## 2020-12-10 PROCEDURE — 81025 URINE PREGNANCY TEST: CPT | Performed by: OBSTETRICS & GYNECOLOGY

## 2020-12-10 RX ORDER — DIPHENHYDRAMINE HYDROCHLORIDE 25 MG/1
25 CAPSULE ORAL 3 TIMES DAILY
Qty: 30 TABLET | Refills: 1 | Status: SHIPPED | OUTPATIENT
Start: 2020-12-10

## 2020-12-18 ENCOUNTER — HOSPITAL ENCOUNTER (EMERGENCY)
Facility: HOSPITAL | Age: 20
Discharge: HOME | End: 2020-12-18
Attending: EMERGENCY MEDICINE
Payer: COMMERCIAL

## 2020-12-18 VITALS
OXYGEN SATURATION: 100 % | SYSTOLIC BLOOD PRESSURE: 107 MMHG | RESPIRATION RATE: 16 BRPM | DIASTOLIC BLOOD PRESSURE: 59 MMHG | BODY MASS INDEX: 20.99 KG/M2 | HEART RATE: 90 BPM | WEIGHT: 97 LBS

## 2020-12-18 DIAGNOSIS — G40.919 BREAKTHROUGH SEIZURE (HCC): Primary | ICD-10-CM

## 2020-12-18 PROCEDURE — 76815 OB US LIMITED FETUS(S): CPT | Performed by: EMERGENCY MEDICINE

## 2020-12-18 PROCEDURE — 99285 EMERGENCY DEPT VISIT HI MDM: CPT | Performed by: EMERGENCY MEDICINE

## 2020-12-18 PROCEDURE — 99284 EMERGENCY DEPT VISIT MOD MDM: CPT

## 2020-12-24 ENCOUNTER — TELEPHONE (OUTPATIENT)
Dept: OTHER | Facility: OTHER | Age: 20
End: 2020-12-24

## 2020-12-24 ENCOUNTER — TELEPHONE (OUTPATIENT)
Dept: OTHER | Facility: HOSPITAL | Age: 20
End: 2020-12-24

## 2020-12-24 ENCOUNTER — TELEPHONE (OUTPATIENT)
Dept: OBGYN CLINIC | Facility: CLINIC | Age: 20
End: 2020-12-24

## 2020-12-24 DIAGNOSIS — R56.9 SEIZURE (HCC): ICD-10-CM

## 2020-12-24 RX ORDER — LEVETIRACETAM 1000 MG/1
1500 TABLET ORAL EVERY 12 HOURS SCHEDULED
Qty: 90 TABLET | Refills: 2 | Status: SHIPPED | OUTPATIENT
Start: 2020-12-24 | End: 2020-12-24 | Stop reason: SDUPTHER

## 2020-12-24 RX ORDER — LEVETIRACETAM 1000 MG/1
1500 TABLET ORAL EVERY 12 HOURS SCHEDULED
Qty: 90 TABLET | Refills: 0 | Status: SHIPPED | OUTPATIENT
Start: 2020-12-24 | End: 2021-01-03 | Stop reason: SDUPTHER

## 2020-12-27 PROBLEM — Z34.91 FIRST TRIMESTER PREGNANCY: Status: ACTIVE | Noted: 2020-12-27

## 2020-12-28 ENCOUNTER — INITIAL PRENATAL (OUTPATIENT)
Dept: OBGYN CLINIC | Facility: CLINIC | Age: 20
End: 2020-12-28

## 2020-12-28 VITALS
WEIGHT: 95.4 LBS | HEART RATE: 84 BPM | BODY MASS INDEX: 20.58 KG/M2 | DIASTOLIC BLOOD PRESSURE: 69 MMHG | HEIGHT: 57 IN | SYSTOLIC BLOOD PRESSURE: 110 MMHG

## 2020-12-28 DIAGNOSIS — O34.219 HISTORY OF CESAREAN DELIVERY, CURRENTLY PREGNANT: ICD-10-CM

## 2020-12-28 DIAGNOSIS — Z34.91 FIRST TRIMESTER PREGNANCY: Primary | ICD-10-CM

## 2020-12-28 DIAGNOSIS — G40.909 SEIZURE DISORDER (HCC): ICD-10-CM

## 2020-12-28 DIAGNOSIS — Z87.59 HISTORY OF POSTPARTUM DEPRESSION: ICD-10-CM

## 2020-12-28 DIAGNOSIS — Z86.59 HISTORY OF POSTPARTUM DEPRESSION: ICD-10-CM

## 2020-12-28 PROCEDURE — 99211 OFF/OP EST MAY X REQ PHY/QHP: CPT | Performed by: NURSE PRACTITIONER

## 2020-12-28 RX ORDER — LEVETIRACETAM 500 MG/1
TABLET ORAL
COMMUNITY
Start: 2020-09-22 | End: 2021-01-12 | Stop reason: DRUGHIGH

## 2020-12-30 ENCOUNTER — TELEPHONE (OUTPATIENT)
Dept: NEUROLOGY | Facility: CLINIC | Age: 20
End: 2020-12-30

## 2020-12-30 ENCOUNTER — HOSPITAL ENCOUNTER (EMERGENCY)
Facility: HOSPITAL | Age: 20
Discharge: HOME | End: 2020-12-30
Attending: EMERGENCY MEDICINE
Payer: COMMERCIAL

## 2020-12-30 VITALS
HEART RATE: 84 BPM | BODY MASS INDEX: 20.49 KG/M2 | DIASTOLIC BLOOD PRESSURE: 52 MMHG | SYSTOLIC BLOOD PRESSURE: 97 MMHG | OXYGEN SATURATION: 100 % | RESPIRATION RATE: 16 BRPM | TEMPERATURE: 97.9 F | HEIGHT: 57 IN | WEIGHT: 95 LBS

## 2020-12-30 DIAGNOSIS — R56.9 SEIZURE (HCC): Primary | ICD-10-CM

## 2020-12-30 LAB
BILIRUB UR QL STRIP: NEGATIVE
CLARITY UR: CLEAR
COLOR UR: YELLOW
EXT PREG TEST URINE: ABNORMAL
EXT. CONTROL ED NAV: ABNORMAL
GLUCOSE UR STRIP-MCNC: NEGATIVE MG/DL
HGB UR QL STRIP.AUTO: NEGATIVE
KETONES UR STRIP-MCNC: NEGATIVE MG/DL
LEUKOCYTE ESTERASE UR QL STRIP: NEGATIVE
NITRITE UR QL STRIP: NEGATIVE
PH UR STRIP.AUTO: 7 [PH] (ref 4.5–8)
PROT UR STRIP-MCNC: NEGATIVE MG/DL
SP GR UR STRIP.AUTO: 1.02 (ref 1–1.03)
UROBILINOGEN UR QL STRIP.AUTO: 0.2 E.U./DL

## 2020-12-30 PROCEDURE — 76815 OB US LIMITED FETUS(S): CPT | Performed by: EMERGENCY MEDICINE

## 2020-12-30 PROCEDURE — 81003 URINALYSIS AUTO W/O SCOPE: CPT

## 2020-12-30 PROCEDURE — 87086 URINE CULTURE/COLONY COUNT: CPT

## 2020-12-30 PROCEDURE — 96374 THER/PROPH/DIAG INJ IV PUSH: CPT

## 2020-12-30 PROCEDURE — 99284 EMERGENCY DEPT VISIT MOD MDM: CPT

## 2020-12-30 PROCEDURE — 81025 URINE PREGNANCY TEST: CPT | Performed by: EMERGENCY MEDICINE

## 2020-12-30 PROCEDURE — 99285 EMERGENCY DEPT VISIT HI MDM: CPT | Performed by: EMERGENCY MEDICINE

## 2020-12-30 RX ORDER — LEVETIRACETAM 1000 MG/1
2000 TABLET ORAL 2 TIMES DAILY
Qty: 40 TABLET | Refills: 0 | Status: SHIPPED | OUTPATIENT
Start: 2020-12-30 | End: 2020-12-31 | Stop reason: SDUPTHER

## 2020-12-30 RX ADMIN — LEVETIRACETAM 1000 MG: 100 INJECTION, SOLUTION INTRAVENOUS at 20:46
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
## 2020-12-30 NOTE — ED ATTENDING ATTESTATION
Final Diagnosis:  1  Seizure [facility])      ED Course as of Melvin 01 2002   Wed Dec 30, 2020   1837 PREGNANCY TEST URINE: POSTIVE   1837 Leukocytes, UA: Negative   1837 Nitrite, UA: Negative   1837 SL AMB SPECIFIC GRAVITY_URINE: 1 020   2016 I did a bedside U/S with the resident  Fetus is moving  There was a subchorionic hemorrhage (tiny) noted which correlates with the vaginal spotting that the patient told to the resident  Celestine Dotson MD, saw and evaluated the patient  All available labs and X-rays were ordered by me or the resident and have been reviewed by myself  I discussed the patient with the resident / non-physician and agree with the resident's / non-physician practitioner's findings and plan as documented in the resident's / non-physician practicitioner's note, except where noted  At this point, I agree with the current assessment done in the ED  I was present during key portions of all procedures performed unless otherwise stated  Chief Complaint   Patient presents with    Seizure - Pregnant     Patient states she is about 10 weeks pregnant, had 2 seizures today and multiple in the past few days  States neurologist told her to come be evaluated in ED  This is a 23 y o  female presenting for evaluation of seizure  The patient has a known seizure disorder  She hasn't been sleeping well for quite some time  She's had multiple breakthrough seizures and has an appointment with the epileptologist / neurologist on 1/8/20201 planned for this  The patient has had increased doses of her keppra  She continues to have seizures and had 1-2 today  No f/ch/n/v/cp/sob  No dizziness/LH  No falls/trauma  She doesn't know how long the seizure lasted but thinks it is exactly like her previous ones  She has been using tylenol to help the headaches that she feels  PMH:   has a past medical history of Anemia, Anxiety, Miscarriage, Seizure ([address] ), Trauma, and Varicella      PSH:   has a past surgical history that includes pr  delivery only (N/A, 2020)  Social:  Social History     Substance and Sexual Activity   Alcohol Use Not Currently    Frequency: Never    Drinks per session: Patient refused    Binge frequency: Never     Social History     Tobacco Use   Smoking Status Never Smoker   Smokeless Tobacco Never Used     Social History     Substance and Sexual Activity   Drug Use Not Currently    Types: Marijuana    Comment: last used in 2019     PE:  Vitals:    20 1702 20 1811 20 2042   BP: 111/71  97/52   BP Location: Right arm  Right arm   Pulse: 95  84   Resp:  16    Temp: 97 9 °F (36 6 °C)     TempSrc: Oral     SpO2: 98%  100%   Weight: 43 1 kg (95 lb)     Height: 4' 9" (1 448 m)     General: VSS, NAD, awake, alert  Well-nourished, well-developed  Appears stated age  Speaking normally in full sentences  Head: Normocephalic, atraumatic, nontender  Eyes: PERRL, EOM-I  No diplopia  No hyphema  No subconjunctival hemorrhages  Symmetrical lids  ENT: Atraumatic external nose and ears  MMM  No malocclusion  No stridor  Normal phonation  No drooling  Normal swallowing  Neck: Symmetric, trachea midline  No JVD  CV: RRR  +S1/S2  No murmurs or gallops  Peripheral pulses +2 throughout  No chest wall tenderness  Lungs:   Unlabored No retractions  CTAB, lungs sounds equal bilateral    No tachypnea  Abd: +BS, soft, NT/ND    MSK:   FROM   Back:   No rashes  Skin: Dry, intact  Neuro: AAOx3, GCS 15, CN II-XII grossly intact  Motor grossly intact  C-spine: NT, supple  No midline tenderness  Kernig's Brudzinski's negative  EHL/FHL/PF/DF/KF/KE/HF/HE 5/5  No saddle anesthesia  2+ patellar reflexes  SLR negative  M/U/R/A nerve sensation bilateral intact and equal    strength 5/5  Finger abduction/adduction/opposition intact  Suppination/Pronation intact without reproduction of pain  Good capillary refill   2+ radial pulses, bilaterally equal  WE/WF/WRD/WUD 5/5, intact, without pain  BICEPS/TRICEPS 5/5  Shoulder abduction/adduction 5/5  No pronator drift  No aphasia/dysarthria  CN 2-12 intact  Normal finger to nose  Normal rapid alternating movements of hands  No nystagmus  No facial droop  I've interacted with her before multiple times; this is the most normal that i've ever seen for her behavior and interactions with me  Psychiatric/Behavioral: Appropriate mood and affect   Exam: deferred  A:  - seizure, recurrent  P:  - [place] through  - talk to epileptology: given that she is pregnant, likely needs elevated doses of meds  - will likely increase dose  - doubt something more dangerous/malignant like CSVT given she is so early in her pregnancy  Doubt meningitis especially given lack of progression or even fevers for quite some time  - I think her sleep hygiene + pregnnacy are playing a large role in her seizures  - 13 point ROS was performed and all are normal unless stated in the history above  - Nursing note reviewed  Vitals reviewed  - Orders placed by myself and/or advanced practitioner / resident     - Previous chart was reviewed  - No language barrier    - History obtained from patient  - There are no limitations to the history obtained  - Critical care time: Not applicable for this patient       Code Status: Prior  Advance Directive and Living Will:      Power of :    POLST:      Medications   levETIRAcetam (KEPPRA) 1,000 mg in sodium chloride 0 9 % 100 mL IVPB (0 mg Intravenous Stopped 12/30/20 2101)     No orders to display     Orders Placed This Encounter   Procedures    Pelvic Ultrasound    Urine culture    POCT pregnancy, urine     Labs Reviewed   POCT PREGNANCY, URINE - Abnormal       Result Value Ref Range Status    EXT PREG TEST UR (Ref: Negative) POSTIVE   Final    Control VALID   Final   URINE MACROSCOPIC, POC    Color, UA Yellow   Final    Clarity, UA Clear   Final    pH, UA 7 0  4 5 - 8 0 Final    Leukocytes, UA Negative  Negative Final    Nitrite, UA Negative  Negative Final    Protein, UA Negative  Negative mg/dl Final    Glucose, UA Negative  Negative mg/dl Final    Ketones, UA Negative  Negative mg/dl Final    Urobilinogen, UA 0 2  0 2, 1 0 E U /dl E U /dl Final    Bilirubin, UA Negative  Negative Final    Blood, UA Negative  Negative Final    Specific Gravity, UA 1 020  1 003 - 1 030 Final    Narrative:     CLINITEK RESULT     Time reflects when diagnosis was documented in both MDM as applicable and the Disposition within this note     Time User Action Codes Description Comment    12/30/2020  8:48 PM Will Click Add [R56 9] Seizure [facility])       ED Disposition     ED Disposition Condition Date/Time Comment    Discharge Stable Wed Dec 30, 2020  8:48 PM Sundar Allen discharge to home/self care  Follow-up Information     Follow up With Specialties Details Why [address] [place] [address]  Call  As needed [address] [facility]  [phone]          Discharge Medication List as of 12/30/2020  8:59 PM      START taking these medications    Details   !! levETIRAcetam (KEPPRA) 1000 MG tablet Take 2 tablets (2,000 mg total) by mouth 2 (two) times a day for 10 days, Starting Wed 12/30/2020, Until Sat 1/9/2021, Normal       !! - Potential duplicate medications found  Please discuss with provider        CONTINUE these medications which have NOT CHANGED    Details   !! levETIRAcetam (KEPPRA) 1000 MG tablet Take 1 5 tablets (1,500 mg total) by mouth every 12 (twelve) hours, Starting Thu 12/24/2020, Normal      Prenatal Vit-Fe Fumarate-FA (PRENATAL 1+1 PO) Take by mouth, Historical Med      Pyridoxine HCl (vitamin B-6) 25 MG tablet Take 1 tablet (25 mg total) by mouth 3 (three) times a day, Starting Thu 12/10/2020, Normal      sertraline (ZOLOFT) 50 mg tablet Take 50 mg by mouth 2 (two) times a day , Historical Med      doxylamine (UNISON) 25 MG tablet Take 1 tablet (25 mg total) by mouth daily at bedtime as needed for sleep or nausea, Starting Thu 12/10/2020, Normal      !! levETIRAcetam (KEPPRA) 500 mg tablet PLEASE SEE ATTACHED FOR DETAILED DIRECTIONS, Historical Med      ondansetron (ZOFRAN-ODT) 4 mg disintegrating tablet Take 1 tablet (4 mg total) by mouth every 6 (six) hours as needed for nausea, Starting Wed 10/14/2020, Print       !! - Potential duplicate medications found  Please discuss with provider  No discharge procedures on file  Prior to Admission Medications   Prescriptions Last Dose Informant Patient Reported? Taking? Prenatal Vit-Fe Fumarate-FA (PRENATAL 1+1 PO)  Self Yes No   Sig: Take by mouth   Pyridoxine HCl (vitamin B-6) 25 MG tablet   No No   Sig: Take 1 tablet (25 mg total) by mouth 3 (three) times a day   doxylamine (UNISON) 25 MG tablet   No No   Sig: Take 1 tablet (25 mg total) by mouth daily at bedtime as needed for sleep or nausea   Patient not taking: Reported on 12/28/2020   levETIRAcetam (KEPPRA) 1000 MG tablet   No No   Sig: Take 1 5 tablets (1,500 mg total) by mouth every 12 (twelve) hours   levETIRAcetam (KEPPRA) 500 mg tablet   Yes No   Sig: PLEASE SEE ATTACHED FOR DETAILED DIRECTIONS   ondansetron (ZOFRAN-ODT) 4 mg disintegrating tablet   No No   Sig: Take 1 tablet (4 mg total) by mouth every 6 (six) hours as needed for nausea   Patient not taking: Reported on 12/28/2020   sertraline (ZOLOFT) 50 mg tablet   Yes No   Sig: Take 50 mg by mouth 2 (two) times a day       Facility-Administered Medications: None       Portions of the record may have been created with voice recognition software  Occasional wrong word or "sound a like" substitutions may have occurred due to the inherent limitations of voice recognition software  Read the chart carefully and recognize, using context, where substitutions have occurred      Electronically signed by:  Michelle Anguiano

## 2020-12-31 ENCOUNTER — HOSPITAL ENCOUNTER (EMERGENCY)
Facility: HOSPITAL | Age: 20
Discharge: HOME | End: 2020-12-31
Attending: EMERGENCY MEDICINE
Payer: COMMERCIAL

## 2020-12-31 VITALS
HEART RATE: 102 BPM | SYSTOLIC BLOOD PRESSURE: 101 MMHG | OXYGEN SATURATION: 99 % | DIASTOLIC BLOOD PRESSURE: 62 MMHG | RESPIRATION RATE: 18 BRPM | TEMPERATURE: 97.6 F

## 2020-12-31 DIAGNOSIS — R56.9 SEIZURE (HCC): ICD-10-CM

## 2020-12-31 DIAGNOSIS — G40.919 BREAKTHROUGH SEIZURE (HCC): Primary | ICD-10-CM

## 2020-12-31 PROCEDURE — 99284 EMERGENCY DEPT VISIT MOD MDM: CPT

## 2020-12-31 PROCEDURE — 99284 EMERGENCY DEPT VISIT MOD MDM: CPT | Performed by: EMERGENCY MEDICINE

## 2020-12-31 RX ORDER — LEVETIRACETAM 1000 MG/1
2000 TABLET ORAL 2 TIMES DAILY
Qty: 40 TABLET | Refills: 0 | Status: SHIPPED | OUTPATIENT
Start: 2020-12-31 | End: 2021-01-12 | Stop reason: SDUPTHER

## 2020-12-31 RX ADMIN — LEVETIRACETAM 2000 MG: 500 TABLET, FILM COATED ORAL at 16:50

## 2020-12-31 NOTE — DISCHARGE INSTRUCTIONS
Please go to [address] and fill your keppra  You should be taking 2000 mg twice a day  Please keepy our follow up appointment with neurology on 1/08    You also need to follow up regularly with your ob/gyn for your pregnancy

## 2020-12-31 NOTE — DISCHARGE INSTRUCTIONS
Keep your appointment with neurology on 01/08  You can also follow-up with PCP if needed  Take your Keppra at the new dose: 2g twice daily  Return to the ED with new or worsening symptoms
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
## 2021-01-01 LAB — BACTERIA UR CULT: NORMAL

## 2021-01-01 NOTE — ED ATTENDING ATTESTATION
12/31/2020  ILizeth MD, saw and evaluated the patient  I have discussed the patient with the resident/non-physician practitioner and agree with the resident's/non-physician practitioner's findings, Plan of Care, and MDM as documented in the resident's/non-physician practitioner's note, except where noted  All available labs and Radiology studies were reviewed  I was present for key portions of any procedure(s) performed by the resident/non-physician practitioner and I was immediately available to provide assistance  At this point I agree with the current assessment done in the Emergency Department  I have conducted an independent evaluation of this patient a history and physical is as follows:    ED Course    patient is a 18-year-old female who is approximately 12 weeks pregnant history of seizure disorder recently seen for seizures loaded on Keppra and discharge  Patient had seizure today admits to not being able to have her Keppra prescription filled she is currently neurologically back at baseline normal vital signs worse no abdominal pain chest pain vaginal bleeding discharge leakage of fluid or contractions      Vitals reviewed    Impression:  Seizure now back at baseline prescription [facility] to facilitate resuming patient's routine seizure meds patient will follow-up with neurology as outpatient strict return precautions given patient discharged home        Critical Care Time  Procedures

## 2021-01-03 ENCOUNTER — HOSPITAL ENCOUNTER (EMERGENCY)
Facility: HOSPITAL | Age: 21
Discharge: HOME | End: 2021-01-03
Attending: EMERGENCY MEDICINE
Payer: COMMERCIAL

## 2021-01-03 VITALS
HEIGHT: 57 IN | BODY MASS INDEX: 20.49 KG/M2 | DIASTOLIC BLOOD PRESSURE: 64 MMHG | OXYGEN SATURATION: 100 % | WEIGHT: 95 LBS | SYSTOLIC BLOOD PRESSURE: 100 MMHG | TEMPERATURE: 98.6 F | RESPIRATION RATE: 20 BRPM | HEART RATE: 97 BPM

## 2021-01-03 DIAGNOSIS — R56.9 SEIZURE (HCC): ICD-10-CM

## 2021-01-03 DIAGNOSIS — Z34.90 PREGNANT: ICD-10-CM

## 2021-01-03 DIAGNOSIS — G40.919 BREAKTHROUGH SEIZURE (HCC): Primary | ICD-10-CM

## 2021-01-03 LAB
BILIRUB UR QL STRIP: NEGATIVE
CLARITY UR: CLEAR
COLOR UR: YELLOW
COLOR, POC: YELLOW
GLUCOSE UR STRIP-MCNC: NEGATIVE MG/DL
HGB UR QL STRIP.AUTO: NEGATIVE
KETONES UR STRIP-MCNC: NEGATIVE MG/DL
LEUKOCYTE ESTERASE UR QL STRIP: NEGATIVE
NITRITE UR QL STRIP: NEGATIVE
PH UR STRIP.AUTO: 7.5 [PH] (ref 4.5–8)
PROT UR STRIP-MCNC: NEGATIVE MG/DL
SP GR UR STRIP.AUTO: >=1.03 (ref 1–1.03)
UROBILINOGEN UR QL STRIP.AUTO: 1 E.U./DL

## 2021-01-03 PROCEDURE — 87086 URINE CULTURE/COLONY COUNT: CPT

## 2021-01-03 PROCEDURE — 81003 URINALYSIS AUTO W/O SCOPE: CPT

## 2021-01-03 PROCEDURE — 99285 EMERGENCY DEPT VISIT HI MDM: CPT | Performed by: EMERGENCY MEDICINE

## 2021-01-03 PROCEDURE — 99284 EMERGENCY DEPT VISIT MOD MDM: CPT

## 2021-01-03 RX ORDER — ONDANSETRON 4 MG/1
4 TABLET, ORALLY DISINTEGRATING ORAL ONCE
Status: COMPLETED | OUTPATIENT
Start: 2021-01-03 | End: 2021-01-03

## 2021-01-03 RX ORDER — ACETAMINOPHEN 325 MG/1
975 TABLET ORAL ONCE
Status: COMPLETED | OUTPATIENT
Start: 2021-01-03 | End: 2021-01-03

## 2021-01-03 RX ORDER — LEVETIRACETAM 1000 MG/1
2000 TABLET ORAL 2 TIMES DAILY
Qty: 90 TABLET | Refills: 0 | Status: SHIPPED | OUTPATIENT
Start: 2021-01-03 | End: 2021-01-12 | Stop reason: DRUGHIGH

## 2021-01-03 RX ORDER — METOCLOPRAMIDE 10 MG/1
10 TABLET ORAL ONCE
Status: DISCONTINUED | OUTPATIENT
Start: 2021-01-03 | End: 2021-01-03

## 2021-01-03 RX ADMIN — ONDANSETRON 4 MG: 4 TABLET, ORALLY DISINTEGRATING ORAL at 20:50

## 2021-01-03 RX ADMIN — LEVETIRACETAM 2000 MG: 750 TABLET ORAL at 19:39

## 2021-01-03 RX ADMIN — ACETAMINOPHEN 975 MG: 325 TABLET, FILM COATED ORAL at 19:38

## 2021-01-04 LAB — BACTERIA UR CULT: NORMAL

## 2021-01-04 NOTE — DISCHARGE INSTRUCTIONS
Patient Instructions: Today you were seen in the emergency department for seizures  We reviewed your ultrasound and urinalysis and determined that you would be able to be discharged  You should take keppra 2mg twice a day for your seizures  You should follow up with neurology  Please return to the emergency department if your symptoms get worse, you develop a fever, or you have any other symptoms we discussed today prior to discharge  Nice to meet you! Best of luck with everything!

## 2021-01-04 NOTE — ED NOTES
Patient ambulatory to bathroom upon discharge  Opened bathroom door and looked at nursing zone, looked like she was going to pass out  Patient motioning that she was going to vomit  Patient assisted to toilet and Dr Xiomara Armenta called to bathroom  Patient assisted to stretcher but unwilling to stay for IV/IV meds  Patient agreeable to PO medication    4mg ODT ordered verbally as per Dr Elayne Smith, RN  01/03/21 2754

## 2021-01-04 NOTE — ED PROCEDURE NOTE
Procedure  POC Pelvic US    Date/Time: 1/3/2021 7:12 PM  Performed by: Len Ferrari MD  Authorized by: Len Ferrari MD     Procedure details:     Indications comment:  Education    Technique:  Transabdominal obstetric (HCG+) exam    Views obtained: uterus (transverse and sagittal) and pouch of Grady      Image quality: diagnostic      Image availability:  Images available in PACS and video obtained  Comments:       [address], MD  01/03/21 2013

## 2021-01-04 NOTE — ED PROVIDER NOTES
Final Diagnosis:  1  Breakthrough seizure ([facility] [address] )    2  Pregnant    3  Seizure [facility])         Chief Complaint   Patient presents with    Seizure - Prior Hx Of     Approx 3 months pregnant, not getting Keppra filled, witnessed seizures  H/o same  3rd visit for same complaint       ASSESSMENT + PLAN:   - Nursing note reviewed  1  Seizure  -Breakthrough seizure  -No neurological deficits, normal examination  -Spoke to EMU on call, recommended to continue 2mg keppra BID  -Patient says she is compliant, recommended to continue taking medications    2  Pregnancy  -Pregnancy with normal U/S  -no abdominal pain    3  Dysuria  -Check U/A  -U/A negative    Final Dispo   Patient was reassessed  Vital signs stable  Patient and/or family given discharge instructions and return precautions  Patient and/or family was reassured  The patient and/or family vocalizes understanding  Answered all of the patient's and/or family's questions  Will follow up with Neurology  Patient and/or family are agreeable to the plan  Medications   levETIRAcetam (KEPPRA) tablet 2,000 mg (2,000 mg Oral Given 1/3/21 1939)   acetaminophen (TYLENOL) tablet 975 mg (975 mg Oral Given 1/3/21 1938)   ondansetron (ZOFRAN-ODT) dispersible tablet 4 mg (4 mg Oral Given 1/3/21 2050)     Time reflects when diagnosis was documented in both MDM as applicable and the Disposition within this note     Time User Action Codes Description Comment    1/3/2021  8:22 PM Hardik Parker Add [G40 919] Breakthrough seizure ([facility] [address] )     1/3/2021  8:22 PM Hardik Parker Add [F44 81] Pregnant     1/3/2021  8:22 PM Hardik Parker Add [R56 9] Seizure ([facility] [address] )     1/3/2021  8:22 PM Hardik Parker Modify [R56 9] Seizure [facility])       ED Disposition     ED Disposition Condition Date/Time Comment    Discharge Stable Sun Melvin 3, 2021  8:22 PM Sundar Allen discharge to home/self care              Follow-up Information     Follow up With Specialties Details Why Contact Info Additional Information Andreia Vidhya U  [address]  Neurology   [facility] [address]  [phone] [facility] Neurology [address], [address], [address], [place], [address] [facility]        Discharge Medication List as of 1/3/2021  8:23 PM      CONTINUE these medications which have CHANGED    Details   !! levETIRAcetam (KEPPRA) 1000 MG tablet Take 2 tablets (2,000 mg total) by mouth 2 (two) times a day, Starting Sun 1/3/2021, Normal       !! - Potential duplicate medications found  Please discuss with provider  CONTINUE these medications which have NOT CHANGED    Details   doxylamine (UNISON) 25 MG tablet Take 1 tablet (25 mg total) by mouth daily at bedtime as needed for sleep or nausea, Starting Thu 12/10/2020, Normal      !! levETIRAcetam (KEPPRA) 1000 MG tablet Take 2 tablets (2,000 mg total) by mouth 2 (two) times a day for 10 days, Starting u 12/31/2020, Until Sun 1/10/2021, Normal      !! levETIRAcetam (KEPPRA) 500 mg tablet PLEASE SEE ATTACHED FOR DETAILED DIRECTIONS, Historical Med      ondansetron (ZOFRAN-ODT) 4 mg disintegrating tablet Take 1 tablet (4 mg total) by mouth every 6 (six) hours as needed for nausea, Starting Wed 10/14/2020, Print      Prenatal Vit-Fe Fumarate-FA (PRENATAL 1+1 PO) Take by mouth, Historical Med      Pyridoxine HCl (vitamin B-6) 25 MG tablet Take 1 tablet (25 mg total) by mouth 3 (three) times a day, Starting Thu 12/10/2020, Normal      sertraline (ZOLOFT) 50 mg tablet Take 50 mg by mouth 2 (two) times a day , Historical Med       !! - Potential duplicate medications found  Please discuss with provider  No discharge procedures on file  Prior to Admission Medications   Prescriptions Last Dose Informant Patient Reported? Taking?    Prenatal Vit-Fe Fumarate-FA (PRENATAL 1+1 PO)  Self Yes No   Sig: Take by mouth   Pyridoxine HCl (vitamin B-6) 25 MG tablet   No No   Sig: Take 1 tablet (25 mg total) by mouth 3 (three) times a day   doxylamine (UNISON) 25 MG tablet   No No   Sig: Take 1 tablet (25 mg total) by mouth daily at bedtime as needed for sleep or nausea   Patient not taking: Reported on 12/28/2020   levETIRAcetam (KEPPRA) 1000 MG tablet   No No   Sig: Take 1 5 tablets (1,500 mg total) by mouth every 12 (twelve) hours   levETIRAcetam (KEPPRA) 1000 MG tablet   No No   Sig: Take 2 tablets (2,000 mg total) by mouth 2 (two) times a day for 10 days   levETIRAcetam (KEPPRA) 1000 MG tablet   No No   Sig: Take 2 tablets (2,000 mg total) by mouth 2 (two) times a day   levETIRAcetam (KEPPRA) 500 mg tablet   Yes No   Sig: PLEASE SEE ATTACHED FOR DETAILED DIRECTIONS   ondansetron (ZOFRAN-ODT) 4 mg disintegrating tablet   No No   Sig: Take 1 tablet (4 mg total) by mouth every 6 (six) hours as needed for nausea   Patient not taking: Reported on 12/28/2020   sertraline (ZOLOFT) 50 mg tablet   Yes No   Sig: Take 50 mg by mouth 2 (two) times a day       Facility-Administered Medications: None       History of Present Illness: This is a 23 y o  female presenting today for evaluation of breakthrough seizure  Patient is currently 3 months pregnant and says that she has had multiple seizures  Review of her chart shows that she has had 2 seizures in the last couple days  She says that she is compliant with her medications  Her Keppra was recently increased to 2 mg twice a day, but she has not gotten to the pharmacy to pick him up  No recent fever, chills, vomiting, diarrhea  Does have some nausea which she says is related to her pregnancy  No vaginal discharge, bleeding, or abdominal pain  Does have some mild dysuria     - No language barrier    - History obtained from patient and chart   - There are no limitations to the history obtained  - Previous charting was reviewed  Some data reviewed included below for ease of access whether or not it is relevant to this patient encounter        Past Medical, Past Surgical History:    has a past medical history of Anemia, Anxiety, Miscarriage, Seizure ([address] ), Trauma, and Varicella  has a past surgical history that includes pr  delivery only (N/A, 2020)  Allergies: Allergies   Allergen Reactions    Ibuprofen Hives and Rash     Reaction Date: ;          Social and Family History:     Social History     Substance and Sexual Activity   Alcohol Use Not Currently    Frequency: Never    Drinks per session: Patient refused    Binge frequency: Never     Social History     Tobacco Use   Smoking Status Never Smoker   Smokeless Tobacco Never Used     Social History     Substance and Sexual Activity   Drug Use Not Currently    Types: Marijuana    Comment: last used in 2019       Review of Systems:   Review of Systems   Constitutional: Negative for chills and fever  HENT: Negative for ear pain and sore throat  Eyes: Negative for pain and visual disturbance  Respiratory: Negative for cough and shortness of breath  Cardiovascular: Negative for chest pain and palpitations  Gastrointestinal: Negative for abdominal pain and vomiting  Genitourinary: Negative for dysuria and hematuria  Musculoskeletal: Negative for arthralgias and back pain  Skin: Negative for color change and rash  Neurological: Positive for seizures  Negative for syncope  All other systems reviewed and are negative  Physical Examination     Vitals:    21 1811 21 1814 21 1815 21 1940   BP:  131/64 131/64 100/64   BP Location:  Right arm     Pulse: (!) 106   97   Resp: 20   20   Temp: 98 6 °F (37 °C)      TempSrc: Oral      SpO2: 100%   100%   Weight: 43 1 kg (95 lb)      Height: 4' 9" (1 448 m)        Vitals reviewed by me  Physical Exam  Vitals signs and nursing note reviewed  Constitutional:       Appearance: She is well-developed  HENT:      Head: Normocephalic and atraumatic     Eyes:      Conjunctiva/sclera: Conjunctivae normal       Pupils: Pupils are equal, round, and reactive to light  Neck:      Musculoskeletal: Normal range of motion and neck supple  Cardiovascular:      Rate and Rhythm: Normal rate and regular rhythm  Pulmonary:      Effort: Pulmonary effort is normal       Breath sounds: Normal breath sounds  Abdominal:      General: Bowel sounds are normal  There is no distension  Palpations: Abdomen is soft  Tenderness: There is no abdominal tenderness  Musculoskeletal: Normal range of motion  Skin:     General: Skin is warm and dry  Neurological:      Mental Status: She is alert and oriented to person, place, and time  GCS: GCS eye subscore is 4  GCS verbal subscore is 5  GCS motor subscore is 6  Cranial Nerves: No cranial nerve deficit  Sensory: No sensory deficit        Coordination: Coordination normal       Gait: Gait normal    Psychiatric:         Mood and Affect: Mood normal                               No orders to display     Orders Placed This Encounter   Procedures    Pelvic Ultrasound    Urine culture    POCT urinalysis dipstick       Labs:     Labs Reviewed   POCT URINALYSIS DIPSTICK - Normal       Result Value Ref Range Status    Color, UA yellow   Final   URINE CULTURE   URINE MACROSCOPIC, POC    Color, UA Yellow   Final    Clarity, UA Clear   Final    pH, UA 7 5  4 5 - 8 0 Final    Leukocytes, UA Negative  Negative Final    Nitrite, UA Negative  Negative Final    Protein, UA Negative  Negative mg/dl Final    Glucose, UA Negative  Negative mg/dl Final    Ketones, UA Negative  Negative mg/dl Final    Urobilinogen, UA 1 0  0 2, 1 0 E U /dl E U /dl Final    Bilirubin, UA Negative  Negative Final    Blood, UA Negative  Negative Final    Specific Gravity, UA >=1 030  1 003 - 1 030 Final    Narrative:     CLINITEK RESULT       Imaging:   Us Bedside Procedure    Result Date: 12/30/2020  Narrative: 2 9 [id number] [id number] 1 364 [id number]    Us Bedside Procedure    Result Date: 12/18/2020  Narrative: 1 2 840 [id number] 2 224 [id number]       Final Diagnosis:  1  Breakthrough seizure ([facility] [address] )    2  Pregnant    3  Seizure [facility])        Code Status: Prior    Portions of the record may have been created with voice recognition software  Occasional wrong word or "sound a like" substitutions may have occurred due to the inherent limitations of voice recognition software  Read the chart carefully and recognize, using context, where substitutions have occurred      Electronically signed by:  Neyda Granados, PGY 2, MD Art Lee MD  01/03/21 7678

## 2021-01-05 NOTE — ED ATTENDING ATTESTATION
1/3/2021  IAmina MD, saw and evaluated the patient  I have discussed the patient with the resident/non-physician practitioner and agree with the resident's/non-physician practitioner's findings, Plan of Care, and MDM as documented in the resident's/non-physician practitioner's note, except where noted  All available labs and Radiology studies were reviewed  I was present for key portions of any procedure(s) performed by the resident/non-physician practitioner and I was immediately available to provide assistance  At this point I agree with the current assessment done in the Emergency Department  I have conducted an independent evaluation of this patient a history and physical is as follows:    ED Course     This patient presents after having a seizure  Patient has had multiple ED visits for similar  Patient is currently 3 months pregnant  She states that during her 1st pregnancy, she also had multiple seizures  Patient has had increasing dosing of her Keppra with no improvement  Patient does state that she has been taking her medication regularly but has not yet picked up her most recent increase in [address]  No additional complaints  Exam: AAOx3, NAD, RRR, CTA, S/NT/ND, no motor/sensory deficits  A/P:  Seizure    Will give dose of Keppra, check urine to evaluate for infection, and discuss case with epileptology    Critical Care Time  Procedures

## 2021-01-06 ENCOUNTER — HOSPITAL ENCOUNTER (EMERGENCY)
Facility: HOSPITAL | Age: 21
Discharge: HOME | End: 2021-01-06
Attending: SURGERY
Payer: COMMERCIAL

## 2021-01-06 ENCOUNTER — PATIENT OUTREACH (OUTPATIENT)
Dept: OBGYN CLINIC | Facility: CLINIC | Age: 21
End: 2021-01-06

## 2021-01-06 ENCOUNTER — APPOINTMENT (EMERGENCY)
Dept: RADIOLOGY | Facility: HOSPITAL | Age: 21
End: 2021-01-06
Payer: COMMERCIAL

## 2021-01-06 VITALS
DIASTOLIC BLOOD PRESSURE: 70 MMHG | SYSTOLIC BLOOD PRESSURE: 102 MMHG | HEART RATE: 116 BPM | RESPIRATION RATE: 18 BRPM | WEIGHT: 103.84 LBS | TEMPERATURE: 97.8 F | OXYGEN SATURATION: 100 %

## 2021-01-06 LAB
BASE EXCESS BLDA CALC-SCNC: -10 MMOL/L (ref -2–3)
GLUCOSE SERPL-MCNC: 95 MG/DL (ref 65–140)
HCO3 BLDA-SCNC: 17 MMOL/L (ref 24–30)
HCT VFR BLD CALC: 33 % (ref 34.8–46.1)
HGB BLDA-MCNC: 11.2 G/DL (ref 11.5–15.4)
PCO2 BLD: 18 MMOL/L (ref 21–32)
PCO2 BLD: 39.9 MM HG (ref 42–50)
PH BLD: 7.24 [PH] (ref 7.3–7.4)
PO2 BLD: 33 MM HG (ref 35–45)
POTASSIUM BLD-SCNC: 3.8 MMOL/L (ref 3.5–5.3)
SAO2 % BLD FROM PO2: 53 % (ref 60–85)
SODIUM BLD-SCNC: 136 MMOL/L (ref 136–145)
SPECIMEN SOURCE: ABNORMAL

## 2021-01-06 PROCEDURE — 84295 ASSAY OF SERUM SODIUM: CPT

## 2021-01-06 PROCEDURE — 84132 ASSAY OF SERUM POTASSIUM: CPT

## 2021-01-06 PROCEDURE — 70450 CT HEAD/BRAIN W/O DYE: CPT

## 2021-01-06 PROCEDURE — 99285 EMERGENCY DEPT VISIT HI MDM: CPT

## 2021-01-06 PROCEDURE — 71045 X-RAY EXAM CHEST 1 VIEW: CPT

## 2021-01-06 PROCEDURE — 99284 EMERGENCY DEPT VISIT MOD MDM: CPT | Performed by: SURGERY

## 2021-01-06 PROCEDURE — 85014 HEMATOCRIT: CPT

## 2021-01-06 PROCEDURE — NC001 PR NO CHARGE: Performed by: EMERGENCY MEDICINE

## 2021-01-06 PROCEDURE — 82947 ASSAY GLUCOSE BLOOD QUANT: CPT

## 2021-01-06 PROCEDURE — 82803 BLOOD GASES ANY COMBINATION: CPT

## 2021-01-07 NOTE — ED PROVIDER NOTES
Emergency Department Airway Evaluation and Management Form    History  Obtained from: EMS  Ibuprofen  Chief Complaint   Patient presents with    Seizure - Pregnant     see trauma flow sheet     HPI  Seizure with unwitnessed fall from standing  Patient with history of seizure disorder, poorly controlled on Keppra, presenting with seizure that either pre seated or was the result of a fall with head strike  Patient confused per EMS  Past Medical History:   Diagnosis Date    Seizure [facility])      History reviewed  No pertinent surgical history  History reviewed  No pertinent family history  Social History     Tobacco Use    Smoking status: Unknown If Ever Smoked   Substance Use Topics    Alcohol use: Not Currently    Drug use: Not Currently     I have reviewed and agree with the history as documented  Review of Systems  Patient denies head pain, neck pain, back pain, chest pain  Twelve systems reviewed negative except as noted  Physical Exam  BP 98/63   Pulse 97   Temp 97 8 °F (36 6 °C) (Oral)   Resp 18   Wt 47 1 kg (103 lb 13 4 oz)   SpO2 100%     Physical Exam  Airway intact  Trachea midline  Breath sounds bilaterally  Chest nontender  Pulses intact  Vitals reviewed  GCS 15  Moves all 4 extremities  ED Medications  Medications - No data to display    Intubation  Procedures    Notes  No acute airway issues      Final Diagnosis  Final diagnoses:   None       ED Provider  Electronically Signed by     French Owens MD  01/06/21 2026

## 2021-01-07 NOTE — TRAUMA DOCUMENTATION
Pt demanding discharge, pt is pulling off all cardiac leads and attempting to remove her IV, Dr Cristina Silver aware, pt is safe to leave and follow up with Neurology and OB/GYN   Pt aware

## 2021-01-07 NOTE — H&P
H&P Exam - Trauma   Kenya Baca 23 y o  female MRN: [id number]  Unit/Bed#: ED 26 Encounter: [id number]    Assessment/Plan   Trauma Alert: Level B  Model of Arrival: Ambulance  Trauma Team: Attending Jose Boss and Residents Michelle Bass  Consultants: None    Trauma Active Problems: Seizure, No traumatic injuries    Trauma Plan:  CT head  Neurology consult given history of seizures, poorly controlled    Chief Complaint: Seizure    History of Present Illness   HPI:  Kenya Baca is a 23 y o  female who is 4 months pregnant who presents following a fall  She reportedly had a seizure and was found down outside a store  She reports hitting the back of her head during the event  No other complaints of pain at this time  Mechanism:Fall    Review of Systems   Constitutional: Negative  HENT: Negative  Eyes: Negative  Respiratory: Negative  Cardiovascular: Negative  Gastrointestinal: Negative  Endocrine: Negative  Genitourinary: Negative  Musculoskeletal: Negative  Skin: Negative  Neurological: Positive for seizures  Psychiatric/Behavioral: Negative  12-point, complete review of systems was reviewed and negative except as stated above  Historical Information     Past Medical History:   Diagnosis Date    Seizure [facility])      History reviewed  No pertinent surgical history  Social History   Social History     Substance and Sexual Activity   Alcohol Use Not Currently     Social History     Substance and Sexual Activity   Drug Use Not Currently     Social History     Tobacco Use   Smoking Status Unknown If Ever Smoked     E-Cigarette/Vaping     E-Cigarette/Vaping Substances       There is no immunization history on file for this patient  Last Tetanus: Unknown  Family History: Non-contributory    Meds/Allergies   current meds:   No current facility-administered medications for this encounter       and PTA meds:   None       Allergies   Allergen Reactions    Ibuprofen PHYSICAL EXAM    PE limited by: none    Objective   Vitals:   First set: Temperature: 97 8 °F (36 6 °C) (01/06/21 1935)  Pulse: (!) 111 (01/06/21 1935)  Respirations: 22 (01/06/21 1935)  Blood Pressure: 103/63 (01/06/21 1935)    Primary Survey:   (A) Airway: Intact  (B) Breathing: Equal bilateral breath sounds  (C) Circulation: Pulses:   normal  (D) Disabliity:  GCS Total:  15  (E) Expose:  Completed    Secondary Survey: (Click on Physical Exam tab above)  Physical Exam  Constitutional:       Appearance: Normal appearance  HENT:      Head: Normocephalic and atraumatic  Right Ear: External ear normal       Left Ear: External ear normal       Nose: Nose normal       Mouth/Throat:      Mouth: Mucous membranes are moist       Pharynx: Oropharynx is clear  Eyes:      Extraocular Movements: Extraocular movements intact  Conjunctiva/sclera: Conjunctivae normal       Pupils: Pupils are equal, round, and reactive to light  Neck:      Musculoskeletal: Normal range of motion  Cardiovascular:      Rate and Rhythm: Normal rate and regular rhythm  Pulses: Normal pulses  Pulmonary:      Effort: Pulmonary effort is normal       Breath sounds: Normal breath sounds  Abdominal:      General: Abdomen is flat  Palpations: Abdomen is soft  Tenderness: There is no abdominal tenderness  There is no guarding or rebound  Musculoskeletal: Normal range of motion  Skin:     General: Skin is warm and dry  Neurological:      General: No focal deficit present  Mental Status: She is alert and oriented to person, place, and time  Psychiatric:         Mood and Affect: Mood normal          Behavior: Behavior normal             Invasive Devices     None                 Lab Results: Results: I have personally reviewed pertinent reports  Imaging/EKG Studies: Results: I have personally reviewed pertinent reports      Other Studies: None    Code Status: No Order  Advance Directive and Living Will: Power of :    POLST:

## 2021-01-11 ENCOUNTER — TELEPHONE (OUTPATIENT)
Dept: NEUROLOGY | Facility: CLINIC | Age: 21
End: 2021-01-11

## 2021-01-11 ENCOUNTER — TELEPHONE (OUTPATIENT)
Dept: OBGYN CLINIC | Facility: CLINIC | Age: 21
End: 2021-01-11

## 2021-01-11 NOTE — TELEPHONE ENCOUNTER
----- Message from [place] sent at 1/8/2021  4:08 PM EST -----  Please reach out to this patient to see what her barriers are to coming into the office for a visit  Today was her 3rd no show and typically patient's are discharged from the practice for this  That being said, I would really like to see her in the office and will request that she is not discharged from the practice  She is currently pregnant and having frequent breakthrough seizures  Her seizures need to be better managed and this can only happen if she is coming into the office for appointments and follow ups  I am willing to do a virtual appointment with her if she is unable to come to the office but would prefer seeing her in person if she agrees to [address] [place] [address] transportation  Management of her seizures is extremely important for her wellbeing as well as the wellbeing of her unborn child  Uncontrolled generalized tonic clonic seizures place her at increased risk for death (SUDEP) and harm to the fetus (blunt force injury, oxygen deprivation, death, etc )  During pregnancy with epilepsy she needs frequent drug levels and follow ups  If there is anything that we can do to increase her access to care I would really appreciate it

## 2021-01-11 NOTE — TELEPHONE ENCOUNTER
Cedric Kovacs,    Could you please schedule the patient with AIME Kumar? Any location would work as I will arrange [facility]  Patient prefers an appt in the afternoon  Please let me know if there is anything available  Thank you very much!

## 2021-01-11 NOTE — TELEPHONE ENCOUNTER
MSW phoned patient and informed that there is an appt available with AIME Esteban tomorrow at 930am  Patient interested in taking appointment  MSW discussed the importance of attending to this medical appt  Patient reported understanding  MSW informed patient that transportation arrives an hour before her appt 830am     MSW phoned [facility] [employer] and spoke with Ed at [phone]  he scheduled the appt for 830am  to [address] [place] [address]

## 2021-01-11 NOTE — TELEPHONE ENCOUNTER
MSW LVM #2 to patient  Phone didn't ring it went straight to VM  Please call back SW, We would really like to reschedule the appt   At [facility]

## 2021-01-11 NOTE — TELEPHONE ENCOUNTER
MSW phoned patient, she reported that she takes the bus to her appointments and she is interested in rescheduling her appt  With AIME De Anda

## 2021-01-11 NOTE — TELEPHONE ENCOUNTER
MSW phoned patient - did not ring  MSW LVM please call back  to discuss transportation options or possibly a virtual visit

## 2021-01-11 NOTE — TELEPHONE ENCOUNTER
I talked with Jerry Crenshaw over the phone, reviewed the importance of her keeping her appointments for neurology and her  Ob appointments  She states she will call Neurology today to reschedule her appointment  She reports unable to schedule her OB appointment right now be but desires a call back at 10:00 a m  and she will schedule  Patient also reports she is taking her prenatal vitamins and 1 mg of folic acid daily  attempted to reach patient at 10:00 a m  left message to call office

## 2021-01-12 ENCOUNTER — TELEPHONE (OUTPATIENT)
Dept: NEUROLOGY | Facility: CLINIC | Age: 21
End: 2021-01-12

## 2021-01-12 ENCOUNTER — OFFICE VISIT (OUTPATIENT)
Dept: NEUROLOGY | Facility: CLINIC | Age: 21
End: 2021-01-12

## 2021-01-12 VITALS
BODY MASS INDEX: 21.34 KG/M2 | SYSTOLIC BLOOD PRESSURE: 98 MMHG | WEIGHT: 98.6 LBS | HEART RATE: 95 BPM | DIASTOLIC BLOOD PRESSURE: 56 MMHG

## 2021-01-12 DIAGNOSIS — G40.309 GENERALIZED EPILEPSY (HCC): Primary | ICD-10-CM

## 2021-01-12 DIAGNOSIS — Z87.898 HISTORY OF DOMESTIC VIOLENCE: ICD-10-CM

## 2021-01-12 PROCEDURE — 99215 OFFICE O/P EST HI 40 MIN: CPT | Performed by: NURSE PRACTITIONER

## 2021-01-12 RX ORDER — LEVETIRACETAM 1000 MG/1
1500 TABLET ORAL 2 TIMES DAILY
Qty: 270 TABLET | Refills: 3 | Status: SHIPPED | OUTPATIENT
Start: 2021-01-12

## 2021-01-12 RX ORDER — FOLIC ACID 1 MG/1
1 TABLET ORAL DAILY
Qty: 90 TABLET | Refills: 3 | Status: SHIPPED | OUTPATIENT
Start: 2021-01-12

## 2021-01-12 NOTE — LETTER
Bertha Morin  [address] [place] [phone]      Our office has been unsuccessful in trying to reach you by phone regarding:    ? Other:_______in network Neurologist___________________________________________      Please contact our office at your earliest convenience  Please call [phone]  and follow the prompts        Sincerely,      [facility]

## 2021-01-12 NOTE — TELEPHONE ENCOUNTER
MSW phoned patient  Patient reported that she is already at [address] [place] [address]  Faheem picked her up  Transportation waiver sent to [employer], MR- she will provide to patient
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
## 2021-01-12 NOTE — ASSESSMENT & PLAN NOTE
Patient with generalized epilepsy manifest as GTC seizures and likely absence seizures which is currently uncontrolled with frequent ER visits complicated by current pregnancy  She is currently taking levetiracetam 1000 mg twice per day when reviewed with patient though prior notes recommended she increase to 2000 mg twice per day  Her neurological exam at today's visit is non-focal      The patient has had a routine EEG in 2019 which was normal  In October 2020 during hospital admission, she was on continuous video EEG which captured a generalized tonic clonic seizure confirming an underlying generalized epilepsy syndrome  MRI in December of 2019 was motion degraded but without acute abnormalities  Consider repeat MRI with and without contrast with seizure protocol after pregnancy to assess for underlying pathology as the cause of her seizures  While her EEG captured a GTC seizure, she has also been having episodes concerning for absence seizures  From her description of events it seems that she has staring with oral automatisms and lost time  These are occurring about every other day and may be prolonged (10 minutes) and transition into a GTC seizure  If these continue with increase in medication, may have to consider ambulatory EEG to confirm these are absence seizures and not behavioral  The fact that these can transition into a GTC seizure is more suggestive that they are related to her generalized epilepsy  We discussed her diagnosis and the risk of uncontrolled seizures including SUDEP  We also reviewed the importance of seizure control during pregnancy due to possible harm to the patient as well as her fetus (injury, oxygen deprivation, death)  Reviewed the risks of AEDs during pregnancy and that levetiracetam is a generally acceptable medication to be on during pregnancy in patients with epilepsy  We discussed that to reduce the risk of NTDs in her fetus she should remain on folic acid 1 mg daily  She notes that she has been taking this but it is not on her medication list so script was sent to her pharmacy  We reviewed seizure safety, first aid, and pregnancy in epilepsy  Information was also included in her after visit summary to review once she is at home and with her father and others that are frequently around her  Discussed the importance of close follow up and to call the office to report continued seizures so that they can continue to be closely managed  During pregnancy she will need levetiracetam levels done at least every other month, though would prefer monthly for this patient  She is aware of the importance of compliance with medication to prevent breakthrough seizures  Since she has been taking levetiracetam 1000 mg twice per day, will have her increase to 1500 mg twice per day  She is tolerating the medication well without side effects or concerns on her current dose  Encouraged to set an alarm on her phone to remind her to take her medication  She will call the office to report seizures and concerns  We discussed that since she is pregnant, if she has further GTC seizures she needs to be evaluated in the ER  Recommend close follow up in 4-6 weeks with epilepsy attending and another 4-6 weeks with myself  Would recommend that each time she presents to the ER with breakthrough seizure she has a levetiracetam level done

## 2021-01-12 NOTE — TELEPHONE ENCOUNTER
MSW phoned patient , no ring option- straight to VM  Please remember that  will pick you up around 830 am appt is scheduled for 930am please call back with any questions or if you need to cancel the appt

## 2021-01-12 NOTE — TELEPHONE ENCOUNTER
Incoming call from Pamella Carl - she is checking out the patient  MSW phoned [facility] and informed Rex that patient is ready to be picked up  Faheem was sent to  patient

## 2021-01-12 NOTE — TELEPHONE ENCOUNTER
----- [facility] sent at 1/12/2021  9:47 AM EST -----  Regarding: therapist  Lelo Brennan,     Can you look into therapists that accept this patient's insurance?

## 2021-01-12 NOTE — PATIENT INSTRUCTIONS
1  Increase levetiracetam (keppra) to 1500 mg (1 5 tablets) twice per day  2  Have your keppra level done in one week  3  Call the office if you are continuing to have seizures and staring spells  4  As we discussed, we need to get these seizures controlled to prevent harm to you and your baby  5  Continue with folic acid 1 mg daily to prevent birth defects  6  Please review the following information at home by yourself and with your father  7  Joanne Colbert will reach out to you regarding getting established with a therapist    8  Call the office in 2 weeks to let us know how you are doing  9  Set an alarm on your phone to remind you to take your medication  SEIZURE SAFETY    Dont let fear of seizures keep you at home  Be smart about your activities to make sure you are safe  The guidelines below can help you be as safe as possible  Make sure the people around you are aware of:   What happens when you have a seizure   Correct seizure first aid   First aid for choking (consider taking a basic life support class)   When they should know to call 911 or for medical help    Avoid common triggers of seizures:   Missing your medications   Not getting enough sleep   Drinking alcohol   Using illegal drugs    Safety measures for at home:  In the bathroom:    Do not take baths in the bathtub  Instead, take only showers  Try to have a family member available while you are in the shower   Make sure the drain in the bathtub/shower is working properly to avoid pooling of water   You can consider a fitted shower seat  Recessed soap trays can minimize injury if you would happen to fall in the shower   Bathroom doors can be hung to open outwards, so that the door can still be opened if you fall against the door   Do not lock the bathroom door  Use an Occupied sign on the door or other signal to let others know you are in the bathroom    o Safety locks can be obtained from the [employer]   On your water heater, set your water temperature to a warm temperature that is not scalding to avoid burns from very hot water   Put non-skid strips/ in the bathtub   Use an electric shaver   Avoid any electrical appliances (including electric shaver) in the bathroom or near water   Use shatterproof glass for mirrors  In the kitchen:    When possible, cook using a microwave   Only cook or use electrical appliances when someone else is in the house and available   As much as possible, grill food and avoid fryers or frying food   Use the back burners of the stove and turn the pot handles toward the back of the stove   Avoid carrying hot pans, pots of boiling water, or very hot food  Serve food or liquids directly from the stove  At the least, minimize the distance hot food is carried   Use precut foods or food processers to limit the need to use knives   Use plastic or durable cups, dishes, and containers instead of breakable glass items  In the bedroom   Low level and wide beds (like a futon) can reduce risk of injury of falling out of bed  If there is a high risk of falling out of bed, you can consider simply putting the mattress on the floor   Avoid sleeping on top bunks of bunk beds   Place a soft carpet on the floor  Around the house   Pad sharp corners of tables, chairs, etc  Round tables and furniture can be considered to avoid sharp corners   Avoid open flames  Place a screen in front of fireplaces and dont build a fire alone   Allow for open spaces with furniture   Avoid loose throw rugs or slippery floors  Non-slip fanta or cushioned fanta can help reduce injury from a fall   Fireproof fabrics and furniture are best, and especially important if you smoke   Doors and windows with safety glass are safer if someone falls against them   Avoid lights and heaters that could easily be knocked over     Use curling irons or clothing irons that have automatic shut off switches   Make sure motor driven equipment or lawn mowers have dead mans handles or seats so they will turn off if you release pressure  Safety measures when away from home  [address] law mandates that you cannot drive for 6 months after your most recent seizure   [place] law mandates that you cannot drive for 6 months after your most recent seizure  Work/Travel   Wear a medical alert bracelet or necklace at all times   Wear a helmet and use protective clothing/equipment when appropriate   Consider telling co-workers and travel companions that you have epilepsy and what to do if you have a seizure   Avoid irregular shifts or disruptions of a regular sleep pattern   Take your medications on time and keep an updated list of medications in your purse or wallet   Do not climb to heights or operate heavy machinery   Stand back from the edges of roads or bus/train platforms when traveling   If you wander when you have a seizure, take a friend along for the trip  Recreation  [employer] can be dangerous  Do not swim alone, in open water, or in murky water that you cannot see the bottom  o Caregivers should be with you in the pool at all times and must be physically able to get you out of the water   Use a flotation device   Scuba diving is not recommended since during a seizure people are unaware of their surroundings  o Having a seizure underwater can be deadly and can endanger the lives of others   Kayaking and canoeing can be especially dangerous  o People with epilepsy are at a higher risk of becoming trapped underneath a canoe or kayak   Wear a helmet when playing contact sports, biking, or if there is a risk of falling  o Patients with epilepsy are at a higher risk of head injury when playing contact sports   Avoid riding a bike, running, or other activities around busy roads, steep hills, or secluded areas   Exercise on soft surfaces   Theme Castaneda: many people with epilepsy can go on rides depending on their type of seizures  o For some people, stress or excitement can trigger a seizure  o Rollercoasters (especially if you are upside-down) are more dangerous for people with epilepsy  Medications   Take your medications on time  If you have trouble remembering, set alarms on your phone  You can visit www  [email or web address]  com to set up reminders through text message to help you remember to take your medications   Use a pillbox to help you keep track of your medications   When out of the house, take any needed medications with you  Consider keeping one or two extra doses with you in case you are unexpectedly away from home longer than planned   If you realize you missed a dose of your medications and it is less than 2 hours until your next dose, skip the missed dose  Do not double up your medication dose  If it is more than 2 hours until your next dose, you can take the missed dose   Avoid drinking alcohol, since this can enhance effects of your seizure medications   Be aware of common and major side effects of your medications   Keep your medications out of the reach of children  Parenting:  Sonya Catheriney your home as much as possible   It is possible that you could drop your baby if you have a seizure while holding or feeding them   If you are nursing a baby, sit on the floor or bed with your back supported so the baby will not fall far if you should lose consciousness   Feed the baby while he or she is seated in an infant seat   Dress, change, and sponge bathe the baby on the floor   Move the baby around in a stroller or small crib   Keep a young baby in a playpen when you are alone, and a toddler in an indoor play yard, or childproof one room and use safety ramachandran at the doors     When out of the house, use a bungee-type cord or restraint harness so your child cannot wander away if you have a seizure that affects your awareness  FIRST AID FOR SEIZURES    What to Do If You Witness a Seizure:     Generalized convulsive (called a generalized tonic-clonic or grand mal seizure)  During this seizure, the person may cry out, suddenly stiffen up, make jerking movements, and fall  The person may turn pale or blue from difficulty breathing  Actions:  1  Stay calm  Talk in a soothing voice and if possible keep onlookers away  2  Prevent injury  Move objects away that the person might hit while jerking uncontrollably  3  Time when the seizure starts and ends  Most seizures stop after only a few minutes  4  Turn him or her gently onto one side  This will help keep the airway clear  5  Never place anything in his/her mouth or give him/her anything by mouth during a seizure  -- Do not give the person water, pills, or food until fully alert  6  Loosen tight clothing or jewelry around his/her neck  7  Make the person as comfortable as possible  8  Place something soft under their head  9  Do not hold the person down  If the person having a seizure thrashes around there is no need for you to restrain them  They are more likely to be combative if restrained  Remember to consider your safety as well  10  Keep onlookers away  11  Be sensitive and supportive, and ask others to do the same  12  Stay with the person until he/she is fully alert  Complex partial seizure (confusional spells)  During this kind of seizure, the person may have a glassy stare; give no response or inappropriate responses when questioned; sit, stand, or walk about aimlessly; make lip smacking or chewing motions; fidget with clothes; appear to be drunk, drugged, or confused  Actions:  1  Make sure the person is safe and wont harm themselves  2  Try to remove harmful objects from around the person (tools, utensils, glasses)  3  Do NOT be aggressive or attempt to restrain the person   They are more likely to be combative if restrained  Remember to consider your safety as well  4  Help prevent the person from wandering, and direct the person to chair or safe position  5  Never place anything in his/her mouth or give him/her anything by mouth during a seizure  -- Do not give the person water, pills, or food until fully alert  6  Keep onlookers away  7  Be sensitive and supportive, and ask others to do the same  8  Stay with the person until he/she is fully alert  CALL 911 if:  1  A convulsive seizure lasts more than 5 minutes  2  The person turns blue during the seizure  3  The person does not start breathing after the seizure  Begin mouth to mouth resuscitation if this would occur  4  The person has one seizure right after the other without coming back to normal consciousness between the seizures  5  The person has not regained consciousness or is still confused after 30 minutes  6  You know the person does not have epilepsy  7  You know the person has diabetes or low blood sugar  8  The person is pregnant, ill, or injured  9  The seizure occurred in water, because the person may have inhaled water  10  The person requests an ambulance or medical help  Rescue medication  Your doctor may prescribe a rescue medication such as lorazepam (Ativan), diazepam (Valium / Diastat), or clonazepam (Klonopin) to terminate a seizure or if you have a history of cluster of seizures   Follow the instructions given by your doctor for these medications    Recognizing Common Seizures (examples)   · Simple partial seizures: Isolated twitching, numbness, sweating, dizziness, nausea/vomiting, disturbances to hearing, vision, smell or taste  No loss of consciousness occurs, and the person remains aware of his/her environment  · Complex partial seizures: Staring, motionless, picking at clothes, smacking lips, swallowing repeatedly or wandering around   The person is not aware of their surroundings and is not fully responsive  · Atonic seizures: Drop attacks or sudden, rapid fall to ground with rapid recovery  · Myoclonic seizures: Brief forceful jerks which can affect the whole body or just part of it  · Absence seizures: May appear to be daydreaming or spacing out   The person is momentarily unresponsive and unaware of what is happening around him/her  · Tonic seizures: Stiffening of part or of the entire body  · Generalized Tonic-Clonic Seizures  Grand-mal seizure   Sudden loss of consciousness with body stiffening followed by continuous jerking movements  A blue tinge around the mouth is likely but lack of oxygen is rare  Loss of bladder and/or bowel control may occur  Pregnancy and Epilepsy    Pregnancy in women with epilepsy is a very important topic and should be discussed with your Neurologist, even if you dont currently plan to get pregnant  Do women with epilepsy have problems getting pregnant? Aj Menjivar Women with epilepsy tend to have fewer children than other women  · This may be partly personal choice, but research has indicated that women with epilepsy have a higher rate of menstrual cycle irregularities and other gynecological problems that may interfere with fertility   If you are having problems, it is important that you talk with your gynecologist/obstetrician and your neurologist to sort through these issues  Will I have a normal, healthy baby?  There is better than a 95% chance that your baby will be perfectly healthy  · Babies born to mothers without epilepsy have about a 2-3% risk of some type of birth defect  · Babies born to women with epilepsy and who are taking seizure medications may overall have about a 4-8% chance of having birth defects (depending on which medication they take), which is still quite low   These risks are increased for some medications (described below) and for women who are taking multiple medications     Again, it is important to discuss your situation with your doctor   Proper prenatal care and prenatal vitamins reduce the risk of birth defects in all women  What is the most important thing I can do before becoming pregnant to reduce the risk of birth defects?  As much as possible plan ahead for pregnancy  · Discuss prenatal care and your medications with your neurologist and gynecologist/obstetrician before you get pregnant  · Eat a good diet and get regular sleep  · Avoid tobacco, alcohol, caffeine, and drugs such as marijuana or cocaine   Before getting pregnant, work with your doctor to be on the fewest number of medications, and on the lowest dose that is appropriate for you   Folic acid (also called folate) is a vitamin that helps  reduce the chances of birth defects in all women, but especially those with epilepsy  It is available in most drug stores and pharmacies  · Taking 1 mg to 4 mg of folic acid each day is recommended for all women with epilepsy who may become pregnant  · Many obstetricians prescribe special prenatal vitamins for women to be taken during the time they are trying to become pregnant   Avoid common triggers of seizures, which include:  · Missing medications  · Poor or irregular sleep  · Drugs and alcohol    Are some seizure medications more dangerous to the baby than others?  Based on a nation-wide pregnancy database, some medications are not associated with a significantly increased risk of birth defects, whereas others do have a higher risk   As of the most recent update in 2014, there is the most information available for Lamotrigine (Lamictal), Levetiracetam (Keppra), and Carbamazepine (Tegretol)     · Lamotrigine, Levetiracetam, and Carbamazepine do not appear to have a significantly higher risk of birth defects when compared to the background risk in all women (about 2-3%)  · Valproate or valproic acid (Depakote) has the highest potential risk of birth defects (about 8-9%), specifically neural tube defects like spina bifida  · Topiramate (Topamax) has a slightly increased risk (about 4 5%) of birth defects, specifically low birth weight and cleft palate  · For many of the newer seizure medications (like Zonisamide, Gabapentin, Oxcarbazepine, etc ) we simply dont have enough information to know their risk of birth defects  · Although the true risk is not known, the risks are felt to still be quite low   Remember:  · Babies born to women without epilepsy have about a 2% risk of birth defects  · Taking folic acid before and during pregnancy can help reduce the risk of birth defects in all women   Most importantly: Remember, a vast majority of women taking seizure medications have normal healthy babies! Should I stop my seizure medications before I get pregnant?  This is a complicated decision and is specific to each woman depending on the type and frequency of her seizures  · Some women may have well controlled seizures for many years and could consider trying to come off medications  · Other women may have frequent seizures and staying on medications may be the most appropriate decision  · Sometimes, the risk of injury to the baby from a bigger seizure is more dangerous than the risk of birth defects from medications   Never change or stop a medication (especially a seizure medication) without first talking to your doctor/neurologist!    Will seizures during pregnancy harm my baby?  Mild seizures, like complex partial seizures, do not usually affect the baby   More severe seizures like generalized tonic-clonic seizures can cause injury to the baby from falling, changes in blood oxygen concentrations, and decreased blood flow to the baby    o Rarely, generalized tonic-clonic seizures can cause a miscarriage      For women who continue to have seizures, or are at a high risk of recurrent seizures, the benefit of the medication to prevent seizures is greater than the small potential risk of birth defects from the medication  · In this situation, it is important to continue to take your seizure medications as recommended by your doctor   If you are having seizures while you are pregnant, make sure your neurologist is aware  Will I have more seizures during preganancy?  Most pregnant women do not see a change in the frequency of their seizures   However, up to 1/3 of women may have more frequent seizures during their pregnancy, especially during the first or third trimester   During pregnancy, the blood level of some seizure medications may decrease due to hormonal changes and increasing body size  · As noted below, your doctor may need to monitor your blood more closely while pregnant and after you deliver your baby  Does having epilepsy require extra care while I am pregnant?  Most women with epilepsy have a normal pregnancy  However, obstetricians consider pregnancies of woman with epilepsy to be high risk so early and continuous prenatal care is very important   The hormonal changes and changes in body size during pregnancy can affect the blood levels of some medications  · Your neurologist may need to closely monitor your bloodwork and adjust the dose of your seizure medication to maintain the same blood level  · After you deliver your baby, your doctor may need to watch blood levels and adjust your medications for a few months while the medication blood levels return to your pre-pregnancy level   If you are taking an enzyme inducing medication, your doctors may recommend that you get a dose of Vitamin K to reduce the risk of bleeding in you and your infant  Will epilepsy cause problems during pregnancy?  Taking seizure medications is not likely to lead to a difficult pregnancy   Women with epilepsy are slightly more likely to have morning sickness, vaginal bleeding, and early or premature labor and delivery     · For this reason, it is important to have regular and continued prenatal care with your obstetrician to identify any problems early  Can I have a normal vaginal delivery?  Although there is a slight increased rate of caesarian delivery in women who have epilepsy, most mothers are able to deliver their babies vaginally  What if I have seizures when I deliver the baby?  Surprisingly few women with epilepsy have seizures during delivery but it can happen   Mild seizures, such as complex partial seizures, do not usually affect the baby or delivery   Generalized convulsions can cause difficulties if you are too sleepy to participate after the convulsion  · In the unlikely event that you have a convulsion during delivery, you might be given an intravenous (IV) medication to stop seizures and you might have to undergo a Caesarian section to protect the baby  Can I breastfeed my child?  This too, is a complicated decision that depends on your own personal situation and wishes  · You must weigh the advantage of breast feeding against the possible risks to the baby from medication   Most antiepileptic drugs pass into breast milk (in varying amounts depending on the medication), so the baby will get some exposure to the drug   It is not known whether antiepileptic drugs affect development   Remember:  the baby was exposed to the drug throughout the pregnancy while in the womb, which is a more important time for brain development  · Exposure after this time seems to be less important   Again, the decision depends on your personal feelings on breastfeeding and how important it is to you  · Some mothers find the nutritional and bonding benefits of breastfeeding to be more important  · Other mothers find the unknown risk of medications on the childs development to be more important   You can discuss your personal situation and preference for breast feeding with your doctor       Will I drop my baby if I have a seizure while breastfeeding?  It is possible that you could drop your baby during breast feeding if you have a seizure   You should breast feed in a position that will not harm the baby if you have a seizure, such as while lying down or with the baby propped on a pillow      Consider pumping your breast milk and feeding your baby from the bottle       Will my baby have epilepsy?  Overall, children born to mothers with epilepsy have about a 2% risk of having epilepsy  · This is about twice the risk of having epilepsy in the general population (which is about 1%), but is still a low likelihood  · Having a father with epilepsy only slightly increases the risk of the child having epilepsy  What should I remember as a care taker of the child when I have uncontrolled epilepsy? · Childproof your home as much as possible   It is possible that you could drop your baby if you have a seizure while holding or feeding them   If you are nursing a baby, sit on the floor or bed with your back supported so the baby will not fall far if you should lose consciousness   Feed the baby while he or she is seated in an infant seat   Dress, change, and sponge bathe the baby on the floor   Move the baby around in a stroller or small crib   Keep a young baby in a playpen when you are alone, and a toddler in an indoor play yard, or childproof one room and use safety ramachandran at the doors   When out of the house, use a bungee-type cord or restraint harness so your child cannot wander away if you have a seizure that affects your awareness   For safety, dont have the baby sleep with you in bed   GET REGULAR SLEEP! · This can be easier said than done with a new baby, but try to have others give you breaks so that you can get regular sleep  Being sleep deprived can make it more likely that you will have a seizure    ·     ALSO NOTE:  If you are a woman with epilepsy and are taking birth control, please visit the following website: www [email or web address]  If you are a woman with epilepsy and currently pregnant, please visit the following website: [email or web address]  Completing  these surveys will help the epilepsy community learn more about pregnancy and contraception in woman with epilepsy

## 2021-01-12 NOTE — PROGRESS NOTES
Review of Systems   Constitutional: Negative  Negative for appetite change and fever  HENT: Negative  Negative for hearing loss, tinnitus, trouble swallowing and voice change  Eyes: Negative  Negative for photophobia and pain  Respiratory: Negative  Negative for shortness of breath  Cardiovascular: Negative  Negative for palpitations  Gastrointestinal: Negative  Negative for nausea and vomiting  Endocrine: Negative  Negative for cold intolerance  Genitourinary: Negative  Negative for dysuria, frequency and urgency  Musculoskeletal: Positive for neck pain  Negative for myalgias  Skin: Negative  Negative for rash  Allergic/Immunologic: Negative  Neurological: Positive for dizziness, seizures (a couple days ago was her last one   about 10 min as stated by patient) and light-headedness  Negative for tremors, syncope, facial asymmetry, speech difficulty, weakness, numbness and headaches  Hematological: Bruises/bleeds easily  Psychiatric/Behavioral: Negative  Negative for confusion, hallucinations and sleep disturbance  All other systems reviewed and are negative

## 2021-01-13 NOTE — TELEPHONE ENCOUNTER
MSW phoned patient LVM please call back to confirm insurance information  Patient reported that she still has Medicaid from [place]  She tried to cancel it back in November 2020 and also applied for [place]icaid, [place], and cash assistance however she has not received a determination  MSW and patient contacted the [facility] office at [phone] on hold for 30 minutes  Patient's application was submitted back in September and patient was denied on 10/26 because patient failed to provide supporting documentation:  Income verification  Letter from [place] that is inactive  Employment letter  Pregnancy letter  Patient will be reapplying online  She requested that MSW send to her via email the website to apply for [place]  To [employer]    email was sent to patient    [employer] [place]compass  web/Public/CMPHome

## 2021-01-15 NOTE — TELEPHONE ENCOUNTER
MSW phoned patient who reported that she received the email with the information to apply for Medicaid  She has not applied yet, will try to apply today  MSW discussed the importance of having medical coverage  Patient reported understanding

## 2021-01-19 ENCOUNTER — TELEPHONE (OUTPATIENT)
Dept: PERINATAL CARE | Facility: CLINIC | Age: 21
End: 2021-01-19

## 2021-01-19 NOTE — TELEPHONE ENCOUNTER
Attempted to reach patient by phone and left voicemail to confirm appointment for MFM ultrasound  1 support person ( must be over the age of 15) may accompany you for your appointment  If you or your support person have traveled outside the state in the past 2 weeks, please call and notify our office today #[phone]  You and your support person must wear a mask ,covering nose and mouth,during your entire visit  To minimize your exposure in our waiting room, please call our office prior to entering the building  Check in and rooming questions will be done via phone  We will give you directions when to enter for your appointment  [place]: [phone]    IF you are not feeling well- cough, fever, shortness of breath or any flu like symptoms, contact your primary care physician or [phone] [facility]  If you are awaiting COVID 19 test results please call and reschedule your appointment    Any questions with these instructions please call Maternal Fetal Medicine nurse line today @ # [phone]

## 2021-01-21 ENCOUNTER — TELEPHONE (OUTPATIENT)
Dept: PERINATAL CARE | Facility: OTHER | Age: 21
End: 2021-01-21

## 2021-01-21 NOTE — TELEPHONE ENCOUNTER
-------------------------------------------------------------    Attempted to reach patient by phone and left voicemail to confirm appointment for MFM ultrasound  1 support person ( must be over the age of 15) may accompany you for your appointment  If you or your support person have traveled outside the state in the past 2 weeks, please call and notify our office today #[phone]  You and your support person must wear a mask ,covering nose and mouth,during your entire visit  To minimize your exposure in our waiting room, please call our office prior to entering the building  Check in and rooming questions will be done via phone  We will give you directions when to enter for your appointment  [place]: [phone]    IF you are not feeling well- cough, fever, shortness of breath or any flu like symptoms, contact your primary care physician or [address] [place] [address]  If you are awaiting COVID 19 test results please call and reschedule your appointment    Any questions with these instructions please call Maternal Fetal Medicine nurse line today @ # [phone]

## 2021-01-22 ENCOUNTER — HOSPITAL ENCOUNTER (EMERGENCY)
Facility: HOSPITAL | Age: 21
Discharge: HOME | End: 2021-01-22
Attending: EMERGENCY MEDICINE
Payer: COMMERCIAL

## 2021-01-22 VITALS
RESPIRATION RATE: 18 BRPM | DIASTOLIC BLOOD PRESSURE: 54 MMHG | SYSTOLIC BLOOD PRESSURE: 94 MMHG | OXYGEN SATURATION: 100 % | HEART RATE: 89 BPM | TEMPERATURE: 97.6 F

## 2021-01-22 DIAGNOSIS — R10.9 ABDOMINAL PAIN IN PREGNANCY: ICD-10-CM

## 2021-01-22 DIAGNOSIS — O26.899 ABDOMINAL PAIN IN PREGNANCY: ICD-10-CM

## 2021-01-22 DIAGNOSIS — G40.909 RECURRENT SEIZURES (HCC): Primary | ICD-10-CM

## 2021-01-22 LAB
ANION GAP SERPL CALCULATED.3IONS-SCNC: 6 MMOL/L (ref 4–13)
BUN SERPL-MCNC: 7 MG/DL (ref 5–25)
CALCIUM SERPL-MCNC: 9.2 MG/DL (ref 8.3–10.1)
CHLORIDE SERPL-SCNC: 108 MMOL/L (ref 100–108)
CO2 SERPL-SCNC: 24 MMOL/L (ref 21–32)
CREAT SERPL-MCNC: 0.42 MG/DL (ref 0.6–1.3)
GFR SERPL CREATININE-BSD FRML MDRD: 147 ML/MIN/1.73SQ M
GLUCOSE SERPL-MCNC: 80 MG/DL (ref 65–140)
POTASSIUM SERPL-SCNC: 4 MMOL/L (ref 3.5–5.3)
SODIUM SERPL-SCNC: 138 MMOL/L (ref 136–145)

## 2021-01-22 PROCEDURE — 76801 OB US < 14 WKS SINGLE FETUS: CPT | Performed by: EMERGENCY MEDICINE

## 2021-01-22 PROCEDURE — 99285 EMERGENCY DEPT VISIT HI MDM: CPT | Performed by: EMERGENCY MEDICINE

## 2021-01-22 PROCEDURE — 80177 DRUG SCRN QUAN LEVETIRACETAM: CPT | Performed by: EMERGENCY MEDICINE

## 2021-01-22 PROCEDURE — 87635 SARS-COV-2 COVID-19 AMP PRB: CPT | Performed by: EMERGENCY MEDICINE

## 2021-01-22 PROCEDURE — 99284 EMERGENCY DEPT VISIT MOD MDM: CPT

## 2021-01-22 PROCEDURE — 36415 COLL VENOUS BLD VENIPUNCTURE: CPT | Performed by: EMERGENCY MEDICINE

## 2021-01-22 PROCEDURE — 80048 BASIC METABOLIC PNL TOTAL CA: CPT | Performed by: EMERGENCY MEDICINE

## 2021-01-22 RX ORDER — LAMOTRIGINE 25 MG/1
TABLET ORAL
Qty: 98 TABLET | Refills: 0 | Status: SHIPPED | OUTPATIENT
Start: 2021-01-22 | End: 2021-02-12

## 2021-01-22 RX ORDER — ACETAMINOPHEN 325 MG/1
975 TABLET ORAL ONCE
Status: COMPLETED | OUTPATIENT
Start: 2021-01-22 | End: 2021-01-22

## 2021-01-22 RX ORDER — LAMOTRIGINE 25 MG/1
25 TABLET ORAL ONCE
Status: DISCONTINUED | OUTPATIENT
Start: 2021-01-22 | End: 2021-01-22 | Stop reason: HOSPADM

## 2021-01-22 RX ADMIN — ACETAMINOPHEN 975 MG: 325 TABLET, FILM COATED ORAL at 13:06

## 2021-01-22 NOTE — ED ATTENDING ATTESTATION
Final Diagnosis:  1  Recurrent seizures ([address] )    2  Abdominal pain in pregnancy           IBrandon MD, saw and evaluated the patient  All available labs and X-rays were ordered by me or the resident and have been reviewed by myself  I discussed the patient with the resident / non-physician and agree with the resident's / non-physician practitioner's findings and plan as documented in the resident's / non-physician practicitioner's note, except where noted  At this point, I agree with the current assessment done in the ED  I was present during key portions of all procedures performed unless otherwise stated  Chief Complaint   Patient presents with    Seizure - Prior Hx Of     pt presents ambulatory with c/o 2 seizures today, last seizure before this last week  recent change in keppra by neurology  This is a 23 y o  female presenting for evaluation of abdominal pain  She is 13 weeks pregnant  She had 2 seizures and fell  PMH:   has a past medical history of Anemia, Anxiety, Miscarriage, Seizure ([address] ), Trauma, and Varicella  PSH:   has a past surgical history that includes pr  delivery only (N/A, 2020)  Social:  Social History     Substance and Sexual Activity   Alcohol Use Not Currently    Frequency: Never    Drinks per session: Patient refused    Binge frequency: Never     Social History     Tobacco Use   Smoking Status Never Smoker   Smokeless Tobacco Never Used     Social History     Substance and Sexual Activity   Drug Use Not Currently    Types: Marijuana    Comment: last used in 2019     PE:  Vitals:    21 1153 21 1357   BP: 90/64 94/54   BP Location:  Left arm   Pulse: 91 89   Resp: 18 18   Temp: 97 6 °F (36 4 °C)    TempSrc: Oral    SpO2: 96% 100%   General: VSS, NAD, awake, alert  Well-nourished, well-developed  Appears stated age  Speaking normally in full sentences  Head: Normocephalic, atraumatic, nontender  Eyes: PERRL, EOM-I   No diplopia  No hyphema  No subconjunctival hemorrhages  Symmetrical lids  ENT: Atraumatic external nose and ears  MMM  No malocclusion  No stridor  Normal phonation  No drooling  Normal swallowing  Neck: Symmetric, trachea midline  No JVD  CV: RRR  +S1/S2  No murmurs or gallops  Peripheral pulses +2 throughout  No chest wall tenderness  Lungs:   Unlabored No retractions  CTAB, lungs sounds equal bilateral    No tachypnea  Abd: +BS, soft, NT  Mildly gravid  MSK:   FROM   Back:   No rashes  Skin: Dry, intact  Neuro: AAOx3, GCS 15, CN II-XII grossly intact  Motor grossly intact  Not currently post ictal  Psychiatric/Behavioral: Appropriate mood and affect   Exam: deferred  A:  - seizure during pregnancy  P:  - bedside U/S  - keppra level  - electroyltes  - talk to neurology  0   Lab Value Date/Time    RH Positive 01/24/2020 0818   - 13 point ROS was performed and all are normal unless stated in the history above  - Nursing note reviewed  Vitals reviewed  - Orders placed by myself and/or advanced practitioner / resident     - Previous chart was reviewed  - No language barrier    - History obtained from patient  - There are no limitations to the history obtained  - Critical care time: Not applicable for this patient       Code Status: Prior  Advance Directive and Living Will:      Power of :    POLST:      Medications   acetaminophen (TYLENOL) tablet 975 mg (975 mg Oral Given 1/22/21 1306)     No orders to display     Orders Placed This Encounter   Procedures    Pelvic Ultrasound    Novel Coronavirus (Covid-19),PCR [facility]    Basic metabolic panel     Labs Reviewed   BASIC METABOLIC PANEL - Abnormal       Result Value Ref Range Status    Sodium 138  136 - 145 mmol/L Final    Potassium 4 0  3 5 - 5 3 mmol/L Final    Chloride 108  100 - 108 mmol/L Final    CO2 24  21 - 32 mmol/L Final    ANION GAP 6  4 - 13 mmol/L Final    BUN 7  5 - 25 mg/dL Final    Creatinine 0 42 (*) 0 60 - 1 30 mg/dL Final    Comment: Standardized to IDMS reference method    Glucose 80  65 - 140 mg/dL Final    Comment: If the patient is fasting, the ADA then defines impaired fasting glucose as > 100 mg/dL and diabetes as > or equal to 123 mg/dL  Specimen collection should occur prior to Sulfasalazine administration due to the potential for falsely depressed results  Specimen collection should occur prior to Sulfapyridine administration due to the potential for falsely elevated results  Calcium 9 2  8 3 - 10 1 mg/dL Final    eGFR 147  ml/min/1 73sq m Final    Narrative:     [place] guidelines for Chronic Kidney Disease (CKD):     Stage 1 with normal or high GFR (GFR > 90 mL/min/1 73 square meters)    Stage 2 Mild CKD (GFR = 60-89 mL/min/1 73 square meters)    Stage 3A Moderate CKD (GFR = 45-59 mL/min/1 73 square meters)    Stage 3B Moderate CKD (GFR = 30-44 mL/min/1 73 square meters)    Stage 4 Severe CKD (GFR = 15-29 mL/min/1 73 square meters)    Stage 5 End Stage CKD (GFR <15 mL/min/1 73 square meters)  Note: GFR calculation is accurate only with a steady state creatinine   LEVETIRACETAM LEVEL     Time reflects when diagnosis was documented in both MDM as applicable and the Disposition within this note     Time User Action Codes Description Comment    1/22/2021 12:47 PM Alyssa Griffin Add [G40 909] Recurrent seizures ([address] )     1/22/2021 12:48 PM Alyssa Macl Add [O26 899,  R10 9] Abdominal pain in pregnancy       ED Disposition     ED Disposition Condition Date/Time Comment    Discharge Stable Fri Jan 22, 2021  1:48 PM Jorge Montes discharge to home/self care  Follow-up Information     Follow up With Specialties Details Why Contact Info Additional [address] Obstetrics and Gynecology Call  To make appointment for reevaluation in 3-5 days   [address]  [place] [id number] 694 [facility] [address], [place], [place], [address] [facility]     Ananth Gilliland MD Neurology Call  To make appointment for reevaluation in 3-5 days  [address]           Discharge Medication List as of 1/22/2021  1:50 PM      START taking these medications    Details   lamoTRIgine (LaMICtal) 25 mg tablet Multiple Dosages:Starting Fri 1/22/2021, Last dose on Thu 1/28/2021, THEN Starting Fri 1/29/2021, Last dose on Thu 2/4/2021, THEN Starting Fri 2/5/2021, Last dose on Thu 2/11/2021Take 1 tablet (25 mg total) by mouth 2 (two) times a day for 7 days, TH EN 2 tablets (50 mg total) 2 (two) times a day for 7 days, THEN 4 tablets (100 mg total) 2 (two) times a day for 7 days  , Print         CONTINUE these medications which have NOT CHANGED    Details   doxylamine (UNISON) 25 MG tablet Take 1 tablet (25 mg total) by mouth daily at bedtime as needed for sleep or nausea, Starting Thu 12/10/2020, Normal      levETIRAcetam (KEPPRA) 1000 MG tablet Take 1 5 tablets (1,500 mg total) by mouth 2 (two) times a day, Starting Tue 1/12/2021, Normal      Prenatal Vit-Fe Fumarate-FA (PRENATAL 1+1 PO) Take by mouth, Historical Med      Pyridoxine HCl (vitamin B-6) 25 MG tablet Take 1 tablet (25 mg total) by mouth 3 (three) times a day, Starting Thu 12/10/2020, Normal      sertraline (ZOLOFT) 50 mg tablet Take 50 mg by mouth 2 (two) times a day , Historical Med      folic acid (FOLVITE) 1 mg tablet Take 1 tablet (1 mg total) by mouth daily, Starting Tue 1/12/2021, Normal      ondansetron (ZOFRAN-ODT) 4 mg disintegrating tablet Take 1 tablet (4 mg total) by mouth every 6 (six) hours as needed for nausea, Starting Wed 10/14/2020, Print           No discharge procedures on file  Prior to Admission Medications   Prescriptions Last Dose Informant Patient Reported? Taking?    Prenatal Vit-Fe Fumarate-FA (PRENATAL 1+1 PO) 1/22/2021 at Unknown time Self Yes Yes   Sig: Take by mouth   Pyridoxine HCl (vitamin B-6) 25 MG tablet 1/21/2021 at Unknown time  No Yes   Sig: Take 1 tablet (25 mg total) by mouth 3 (three) times a day   doxylamine (UNISON) 25 MG tablet 1/21/2021 at Unknown time  No Yes   Sig: Take 1 tablet (25 mg total) by mouth daily at bedtime as needed for sleep or nausea   folic acid (FOLVITE) 1 mg tablet   No No   Sig: Take 1 tablet (1 mg total) by mouth daily   levETIRAcetam (KEPPRA) 1000 MG tablet 1/22/2021 at Unknown time  No Yes   Sig: Take 1 5 tablets (1,500 mg total) by mouth 2 (two) times a day   Patient taking differently: Take 2,000 mg by mouth 2 (two) times a day    ondansetron (ZOFRAN-ODT) 4 mg disintegrating tablet   No No   Sig: Take 1 tablet (4 mg total) by mouth every 6 (six) hours as needed for nausea   Patient not taking: Reported on 12/28/2020   sertraline (ZOLOFT) 50 mg tablet 1/22/2021 at Unknown time  Yes Yes   Sig: Take 50 mg by mouth 2 (two) times a day       Facility-Administered Medications: None       Portions of the record may have been created with voice recognition software  Occasional wrong word or "sound a like" substitutions may have occurred due to the inherent limitations of voice recognition software  Read the chart carefully and recognize, using context, where substitutions have occurred      Electronically signed by:  Maia Grimes
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
## 2021-01-22 NOTE — DISCHARGE INSTRUCTIONS
You have been seen for seizures  Please continue your home dose of keppra daily  Please also add lamictal to your daily medication regiman as prescribed  You can take tylenol as needed for discomfort  Return to the emergency department if you develop worsening pain, vaginal bleeding, recurrent seizure, lightheadedness or any other symptoms of concern  Please follow up with your OB/GYN by calling the number provided

## 2021-01-23 LAB — SARS-COV-2 RNA RESP QL NAA+PROBE: NEGATIVE

## 2021-01-25 LAB — LEVETIRACETAM SERPL-MCNC: 29.3 UG/ML (ref 10–40)

## 2021-01-26 NOTE — TELEPHONE ENCOUNTER
MSW phoned patient and informed that she has an upcoming appt with Dr Michelle Crum at 9:45 on 2/10/2021  Patient reported that she doesn't have transportation and will be taking the bus  MSW offered to provide her with the bus route information but patient reported that she has it  MSW also made patient aware that if her Medicaid from [place] is inactive she will probably will start receiving bills from the Hospital  Once approved for Medicaid from [place] they may be able to pay 3 months back of bills  That is why is very important that she applies as soon as possible for [place]  Patient reported that has not applied yet  MSW informed her that if there is anything else that she can assist with to please contact her

## 2021-01-28 ENCOUNTER — TELEPHONE (OUTPATIENT)
Dept: OTHER | Facility: OTHER | Age: 21
End: 2021-01-28

## 2021-01-28 NOTE — TELEPHONE ENCOUNTER
The patient was called for notification of a test result for COVID-19  The patient did not answer the phone and a voicemail was left requesting a call back to [phone], Option 7

## 2021-01-28 NOTE — TELEPHONE ENCOUNTER
The patient was called for notification of a test result for COVID-19  The patient did not answer the phone and a voicemail was left requesting a call back to [phone], Option 7

## 2021-02-09 NOTE — TELEPHONE ENCOUNTER
Please have her establish with neurology near her ASAP  She is pregnant and has uncontrolled seizures

## 2021-02-09 NOTE — TELEPHONE ENCOUNTER
MSW phoned patient she moved to [place] and wants upcoming appointments with [facility] to be cancelled  MSW made pt aware that she has an upcoming appt with AIME Castellanos on March 22  She reported that she will not attend to the appt  Patient shared that she is not feeling well due to pregnancy  MSW asked patient if she was going to call her provider  Patient hung up the phone

## 2021-02-12 NOTE — TELEPHONE ENCOUNTER
MSW phoned patient, call went straight to voice mail  Please call back MSW to learn if in need of assistance with being linked with a Neurologist in [place]  Please call back

## 2021-02-15 NOTE — TELEPHONE ENCOUNTER
Attempt #2    MSW phoned patient, call went straight to voice mail  Please call back MSW to learn if in need of assistance with being linked with a Neurologist in [place]  Please call back

## 2021-02-16 NOTE — TELEPHONE ENCOUNTER
Attempt #3    MSW LVM - Please call back to learn if you are connected with Neurologist at [place]  We can assist with finding a provider that is in network if not already connected   Thank you

## 2021-03-02 ENCOUNTER — TELEPHONE (OUTPATIENT)
Dept: OBGYN CLINIC | Facility: CLINIC | Age: 21
End: 2021-03-02

## 2021-05-17 NOTE — ED ATTENDING ATTESTATION
I,John Erazo MD, saw and evaluated the patient  I have discussed the patient with the resident/non-physician practitioner and agree with the resident's/non-physician practitioner's findings, Plan of Care, and MDM as documented in the resident's/non-physician practitioner's note, except where noted  All available labs and Radiology studies were reviewed  I was present for key portions of any procedure(s) performed by the resident/non-physician practitioner and I was immediately available to provide assistance  At this point I agree with the current assessment done in the Emergency Department  I have conducted an independent evaluation of this patient including a focused history and a physical exam         71-year-old female, G 1 P0, approximately 35 weeks pregnant, presenting to the emergency department for evaluation of 3 seizures  Patient has remote history of a single seizure at age 14  The patient had recurrence of seizures during the pregnancy starting approximately 1 month ago  She has had approximately 4 seizures through the course of this month, and has been seen by OBGYN and started on Keppra for seizure prophylaxis, but this evening prior to arrival to the emergency department the patient had 3 seizures  Seizures were witnessed by her sister who describes color change and shaking of the bilateral upper extremities consistent with grand mal seizures  No bowel or bladder incontinence  No tongue biting  Patient reports waxing and waning posterior headache  Ten systems reviewed and negative except as noted  The patient is resting comfortably on a stretcher in no acute respiratory distress  The patient appears nontoxic  HEENT reveals moist mucous membranes  Head is normocephalic and atraumatic  Conjunctiva and sclera are normal  Neck is nontender and supple with full range of motion to flexion, extension, lateral rotation  No meningismus appreciated  No masses are appreciated   Lungs are clear to auscultation bilaterally without any wheezes, rales or rhonchi  Heart is regular rate and rhythm without any murmurs, rubs or gallops  Abdomen is soft and nontender without any rebound or guarding  Extremities appear grossly normal without any significant arthropathy  Patient is awake, alert, and oriented x3  The patient has normal interaction  Cranial nerves 2-12 are intact  Motor is 5 out of 5 bilateral upper lower extremities  Sensation intact  3+ bilateral patellar reflex  No clonus at the ankles  The patient was not ambulated in the emergency department  Seizures in the 3rd trimester, concern for eclampsia, however the patient is not noted to be hypertensive  Labs Reviewed - No data to display    Patient was discussed with OBGYN resident who requested that we hold on magnesium infusion until they had a chance to see the patient in labor and delivery  Patient was transferred from emergency department up to Labor and delivery neurologically intact, not seizing, not hypertensive  Calm

## 2022-06-14 NOTE — PROGRESS NOTES
Triage Note - OB  Jc Vasquez 20 y o  female MRN: [id number]  Unit/Bed#: LD Triage 3-01 Encounter: [id number]    Chief Complaint:   Chief Complaint   Patient presents with    Seizure - Pregnant     pt hx of seizures seized at home unwitnessed  Pt no hx of ecclempsia during pregnancy  pt 32 weeks pregnant reporting normal pregnancy  Pt reporting "a little bitof cramping in my belly"     MICHAEL: Estimated Date of Delivery: 20    HPI: 20 y o  female  at 28 5/7 weeks presents to L&D triage with a chief complaint of abdominal cramping  Located over lower pelvic region, Started after she fell  Regarding fall, states she was getting up and noticed some weakness in her lower legs  She was attempting to get back on the bed and states she hit her head  Fall was not witnessed  States she bit her tongue  Notes headache  Notes nausea/vomiting  Prior to fall, she noted seeing bright lights  States she "blacked out"  Denies diaphoresis, palpitations, excessive salivation, or upper/lower extremity numbness/tingling  Additional Ob concerns below     Vaginal Bleeding: Denies  Leakage of Fluid: Denies  Uterine Contractions: Denies  Fetal Movement: Present    Last U/S on 19: Vertex, Normal Growth, Anterior Placenta, No Placenta Previa, EFW: 4lbs 5oz, AMELIA: Normal      FHR: 130 Cat1  [place]: 1 Contraction otherwise no uterine contractions     Vitals: Blood pressure 102/58, pulse 100, temperature 98 °F (36 7 °C), temperature source Oral, resp  rate 20, height 4' 8" (1 422 m), weight 61 2 kg (135 lb), last menstrual period 2019, SpO2 97 %  ,Body mass index is 30 27 kg/m²      Physical Exam  GEN: well developed and well nourished, alert, oriented times 3  HEENT: NC/AT, JOSH, EOMI, Tongue Lacerations  Heart: Regular rate and rhythm, S1S2 present or without murmur or extra heart sounds   Lungs: clear to auscultation bilaterally, no wheezes, no rhonchi and normal symmetric air entry  Abd: soft, non tender, positive bowel sounds and gravid  SSE: Closed Cervical Os; No Pooling Noted    Labs:   Admission on 12/10/2019   Component Date Value    Sodium 12/10/2019 140     Potassium 12/10/2019 3 4*    Chloride 12/10/2019 109*    CO2 12/10/2019 23     ANION GAP 12/10/2019 8     BUN 12/10/2019 5     Creatinine 12/10/2019 0 45*    Glucose 12/10/2019 98     Calcium 12/10/2019 8 5     AST 12/10/2019 17     ALT 12/10/2019 17     Alkaline Phosphatase 12/10/2019 94     Total Protein 12/10/2019 6 5     Albumin 12/10/2019 2 7*    Total Bilirubin 12/10/2019 0 13*    eGFR 12/10/2019 145     WBC 12/10/2019 12 49*    RBC 12/10/2019 3 01*    Hemoglobin 12/10/2019 8 3*    Hematocrit 12/10/2019 25 2*    MCV 12/10/2019 84     MCH 12/10/2019 27 6     MCHC 12/10/2019 32 9     RDW 12/10/2019 13 2     MPV 12/10/2019 10 2     Platelets  239     nRBC 12/10/2019 0     Neutrophils Relative 12/10/2019 63     Immat GRANS % 12/10/2019 1     Lymphocytes Relative 12/10/2019 23     Monocytes Relative 12/10/2019 11     Eosinophils Relative 12/10/2019 2     Basophils Relative 12/10/2019 0     Neutrophils Absolute 12/10/2019 8 01*    Immature Grans Absolute 12/10/2019 0 11     Lymphocytes Absolute 12/10/2019 2 85     Monocytes Absolute 12/10/2019 1 31*    Eosinophils Absolute 12/10/2019 0 19     Basophils Absolute 12/10/2019 0 02     Protein, UA (Ref: Negati* 12/10/2019 100     Creatinine, Ur 12/10/2019 42 7     Protein Urine Random 12/10/2019 56     Prot/Creat Ratio, Ur 12/10/2019 1 31*    Protime 12/10/2019 13 0     INR 12/10/2019 1 02     PTT 12/10/2019 24     ABO Grouping 12/10/2019 O     Rh Factor 12/10/2019 Positive     Antibody Screen 12/10/2019 Negative     Specimen Expiration Date 12/10/2019 [id number]        Lab, Imaging and other studies: I have personally reviewed pertinent reports      A:    · 20year old  at 28 5/7 weeks presents with seizure-like activity and abdominal cramping    P:    PTL work-up performed  · Negative Pooling; Negative Nitrazine; Negative KOH and Wet [place]  · Cervical Length: 4 07cm - 4 08cm - 4 26cm / No funneling or dynamic changes    s/p LR 1L Bolus    **Regarding Seizure-Like Activity; per chart review, normal MRI and EEG after recent evaluation at [facility] on 12/6/19; not currently on any anti-seizure medication during this pregnancy; hx of Anxiety on Zoloft; H&P suggestive of Syncopal Episode vs  Seizures vs  Pseudoseizures vs  Mechanical Fall; Recommend Head CT, Orthostatics, EKG, and Neurology Evaluation**      Discharge instructions given to patient and labor precautions reviewed    Plan of care discussed and agreed upon with Dr Ted Mora and Dr Mario Germain Consent (Lip)/Introductory Paragraph: The rationale for Mohs was explained to the patient and consent was obtained. The risks, benefits and alternatives to therapy were discussed in detail. Specifically, the risks of lip deformity, changes in the oral aperture, infection, scarring, bleeding, prolonged wound healing, incomplete removal, allergy to anesthesia, nerve injury and recurrence were addressed. Prior to the procedure, the treatment site was clearly identified and confirmed by the patient. All components of Universal Protocol/PAUSE Rule completed.

## 2022-10-08 NOTE — ED ATTENDING ATTESTATION
2020  I, Nallely Andrews MD, saw and evaluated the patient  I have discussed the patient with the resident/non-physician practitioner and agree with the resident's/non-physician practitioner's findings, Plan of Care, and MDM as documented in the resident's/non-physician practitioner's note, except where noted  All available labs and Radiology studies were reviewed  I was present for key portions of any procedure(s) performed by the resident/non-physician practitioner and I was immediately available to provide assistance  At this point I agree with the current assessment done in the Emergency Department  I have conducted an independent evaluation of this patient a history and physical is as follows:     38w, Sz, hx of same  On keppra  Negative stroke work up per chart review  Has R sided jaw pain  Having lower abd pressure  VS and nursing notes reviewed  General: Appears in NAD, awake, alert, speaking normally in full sentences  Well-nourished, well-developed  Appears stated age  Head: Normocephalic, atraumatic  Eyes: EOMI  Vision grossly normal  No subconjunctival hemorrhages or occular discharge noted  Symmetrical lids  ENT: Atraumatic external nose and ears  No stridor  Normal phonation  No drooling  Normal swallowing  Neck: No JVD  FROM  No goiter  CV: No pallor  Normal rate  Lungs: No tachypnea  No respiratory distress  ABD: gravid uterus, nontender  GYN: Closed, high, thick  No blood  MSK: Moving all extremities equally, no peripheral edema  Skin: Dry, intact  No cyanosis  Neuro: Awake, alert, GCS15  CN II-XII grossly intact  Grossly normal gait  Psychiatric/Behavioral: Appropriate mood and affect  A/P: This is a 20 y o  female who presents to the ED for evaluation of seizure  Hx of same  Norml VS and physical  Doubt ecclampsia  Will give mag, load keppra  Panorex due to jaw pain   ??fall  D/W OB - will not come to ED, will send up as soon as panorex completed and meds competed  Panoxrex reviewed by me - no clear fracture seen  Labs without evidence of HELP syndrome  BP remains stable      ED Course       Critical Care Time  CriticalCare Time  Performed by: Mary Mosley MD  Authorized by: Mary Mosley MD     Critical care provider statement:     Critical care time (minutes):  31    Critical care was necessary to treat or prevent imminent or life-threatening deterioration of the following conditions:  CNS failure or compromise    Critical care was time spent personally by me on the following activities:  Obtaining history from patient or surrogate, development of treatment plan with patient or surrogate, discussions with consultants, examination of patient, evaluation of patient's response to treatment, review of old charts, re-evaluation of patient's condition, ordering and review of radiographic studies, ordering and performing treatments and interventions and ordering and review of laboratory studies no
